# Patient Record
Sex: MALE | HISPANIC OR LATINO | URBAN - METROPOLITAN AREA
[De-identification: names, ages, dates, MRNs, and addresses within clinical notes are randomized per-mention and may not be internally consistent; named-entity substitution may affect disease eponyms.]

---

## 2021-05-05 ENCOUNTER — INPATIENT (INPATIENT)
Facility: HOSPITAL | Age: 71
LOS: 9 days | Discharge: HOME HEALTH | DRG: 246 | End: 2021-05-15
Attending: INTERNAL MEDICINE | Admitting: INTERNAL MEDICINE
Payer: MEDICARE

## 2021-05-05 VITALS
DIASTOLIC BLOOD PRESSURE: 131 MMHG | OXYGEN SATURATION: 97 % | RESPIRATION RATE: 24 BRPM | HEART RATE: 131 BPM | SYSTOLIC BLOOD PRESSURE: 235 MMHG

## 2021-05-05 DIAGNOSIS — I50.20 UNSPECIFIED SYSTOLIC (CONGESTIVE) HEART FAILURE: ICD-10-CM

## 2021-05-05 DIAGNOSIS — R77.8 OTHER SPECIFIED ABNORMALITIES OF PLASMA PROTEINS: ICD-10-CM

## 2021-05-05 DIAGNOSIS — D72.829 ELEVATED WHITE BLOOD CELL COUNT, UNSPECIFIED: ICD-10-CM

## 2021-05-05 DIAGNOSIS — N18.9 CHRONIC KIDNEY DISEASE, UNSPECIFIED: ICD-10-CM

## 2021-05-05 DIAGNOSIS — J96.02 ACUTE RESPIRATORY FAILURE WITH HYPERCAPNIA: ICD-10-CM

## 2021-05-05 DIAGNOSIS — E78.5 HYPERLIPIDEMIA, UNSPECIFIED: ICD-10-CM

## 2021-05-05 DIAGNOSIS — N40.0 BENIGN PROSTATIC HYPERPLASIA WITHOUT LOWER URINARY TRACT SYMPTOMS: ICD-10-CM

## 2021-05-05 DIAGNOSIS — I21.4 NON-ST ELEVATION (NSTEMI) MYOCARDIAL INFARCTION: ICD-10-CM

## 2021-05-05 DIAGNOSIS — I16.1 HYPERTENSIVE EMERGENCY: ICD-10-CM

## 2021-05-05 LAB
ALBUMIN SERPL ELPH-MCNC: 4 G/DL — SIGNIFICANT CHANGE UP (ref 3.3–5)
ALP SERPL-CCNC: 94 U/L — SIGNIFICANT CHANGE UP (ref 40–120)
ALT FLD-CCNC: 50 U/L — HIGH (ref 10–45)
ANION GAP SERPL CALC-SCNC: 10 MMOL/L — SIGNIFICANT CHANGE UP (ref 5–17)
APPEARANCE UR: CLEAR — SIGNIFICANT CHANGE UP
APTT BLD: 28.4 SEC — SIGNIFICANT CHANGE UP (ref 27.5–35.5)
AST SERPL-CCNC: 71 U/L — HIGH (ref 10–40)
BACTERIA # UR AUTO: PRESENT /HPF
BASE EXCESS BLDV CALC-SCNC: -2.5 MMOL/L — LOW (ref -2–3)
BASE EXCESS BLDV CALC-SCNC: -3.6 MMOL/L — LOW (ref -2–3)
BASOPHILS # BLD AUTO: 0.14 K/UL — SIGNIFICANT CHANGE UP (ref 0–0.2)
BASOPHILS NFR BLD AUTO: 0.8 % — SIGNIFICANT CHANGE UP (ref 0–2)
BILIRUB SERPL-MCNC: 0.2 MG/DL — SIGNIFICANT CHANGE UP (ref 0.2–1.2)
BILIRUB UR-MCNC: NEGATIVE — SIGNIFICANT CHANGE UP
BUN SERPL-MCNC: 41 MG/DL — HIGH (ref 7–23)
CA-I SERPL-SCNC: 1.09 MMOL/L — LOW (ref 1.15–1.33)
CALCIUM SERPL-MCNC: 8.7 MG/DL — SIGNIFICANT CHANGE UP (ref 8.4–10.5)
CHLORIDE SERPL-SCNC: 101 MMOL/L — SIGNIFICANT CHANGE UP (ref 96–108)
CK MB CFR SERPL CALC: 11.6 NG/ML — HIGH (ref 0–6.7)
CK MB CFR SERPL CALC: 12.2 NG/ML — HIGH (ref 0–6.7)
CK SERPL-CCNC: 326 U/L — HIGH (ref 30–200)
CK SERPL-CCNC: 371 U/L — HIGH (ref 30–200)
CK SERPL-CCNC: 417 U/L — HIGH (ref 30–200)
CO2 BLDV-SCNC: 28.8 MMOL/L — HIGH (ref 22–26)
CO2 BLDV-SCNC: 32.1 MMOL/L — HIGH (ref 22–26)
CO2 SERPL-SCNC: 28 MMOL/L — SIGNIFICANT CHANGE UP (ref 22–31)
COLOR SPEC: YELLOW — SIGNIFICANT CHANGE UP
COMMENT - URINE: SIGNIFICANT CHANGE UP
CREAT SERPL-MCNC: 3.08 MG/DL — HIGH (ref 0.5–1.3)
CRP SERPL-MCNC: 7.2 MG/L — HIGH (ref 0–4)
D DIMER BLD IA.RAPID-MCNC: 600 NG/ML DDU — HIGH
DIFF PNL FLD: ABNORMAL
EOSINOPHIL # BLD AUTO: 0.43 K/UL — SIGNIFICANT CHANGE UP (ref 0–0.5)
EOSINOPHIL NFR BLD AUTO: 2.4 % — SIGNIFICANT CHANGE UP (ref 0–6)
EPI CELLS # UR: SIGNIFICANT CHANGE UP /HPF (ref 0–5)
FERRITIN SERPL-MCNC: 129 NG/ML — SIGNIFICANT CHANGE UP (ref 30–400)
FIBRINOGEN PPP-MCNC: 479 MG/DL — HIGH (ref 258–438)
GAS PNL BLDA: SIGNIFICANT CHANGE UP
GAS PNL BLDV: 141 MMOL/L — SIGNIFICANT CHANGE UP (ref 136–145)
GAS PNL BLDV: SIGNIFICANT CHANGE UP
GAS PNL BLDV: SIGNIFICANT CHANGE UP
GLUCOSE SERPL-MCNC: 254 MG/DL — HIGH (ref 70–99)
GLUCOSE UR QL: NEGATIVE — SIGNIFICANT CHANGE UP
HCO3 BLDV-SCNC: 27 MMOL/L — SIGNIFICANT CHANGE UP (ref 22–29)
HCO3 BLDV-SCNC: 29 MMOL/L — SIGNIFICANT CHANGE UP (ref 22–29)
HCT VFR BLD CALC: 35 % — LOW (ref 39–50)
HGB BLD-MCNC: 10.1 G/DL — LOW (ref 13–17)
IMM GRANULOCYTES NFR BLD AUTO: 0.7 % — SIGNIFICANT CHANGE UP (ref 0–1.5)
INR BLD: 1.1 — SIGNIFICANT CHANGE UP (ref 0.88–1.16)
KETONES UR-MCNC: NEGATIVE — SIGNIFICANT CHANGE UP
LACTATE SERPL-SCNC: 0.7 MMOL/L — SIGNIFICANT CHANGE UP (ref 0.5–2)
LDH SERPL L TO P-CCNC: 303 U/L — HIGH (ref 50–242)
LEUKOCYTE ESTERASE UR-ACNC: NEGATIVE — SIGNIFICANT CHANGE UP
LYMPHOCYTES # BLD AUTO: 21.5 % — SIGNIFICANT CHANGE UP (ref 13–44)
LYMPHOCYTES # BLD AUTO: 3.85 K/UL — HIGH (ref 1–3.3)
MCHC RBC-ENTMCNC: 27.5 PG — SIGNIFICANT CHANGE UP (ref 27–34)
MCHC RBC-ENTMCNC: 28.9 GM/DL — LOW (ref 32–36)
MCV RBC AUTO: 95.4 FL — SIGNIFICANT CHANGE UP (ref 80–100)
MONOCYTES # BLD AUTO: 1.09 K/UL — HIGH (ref 0–0.9)
MONOCYTES NFR BLD AUTO: 6.1 % — SIGNIFICANT CHANGE UP (ref 2–14)
NEUTROPHILS # BLD AUTO: 12.31 K/UL — HIGH (ref 1.8–7.4)
NEUTROPHILS NFR BLD AUTO: 68.5 % — SIGNIFICANT CHANGE UP (ref 43–77)
NITRITE UR-MCNC: NEGATIVE — SIGNIFICANT CHANGE UP
NRBC # BLD: 0 /100 WBCS — SIGNIFICANT CHANGE UP (ref 0–0)
NT-PROBNP SERPL-SCNC: 3570 PG/ML — HIGH (ref 0–300)
PCO2 BLDV: 73 MMHG — HIGH (ref 42–55)
PCO2 BLDV: 90 MMHG — HIGH (ref 42–55)
PH BLDV: 7.12 — LOW (ref 7.32–7.43)
PH BLDV: 7.17 — LOW (ref 7.32–7.43)
PH UR: 6 — SIGNIFICANT CHANGE UP (ref 5–8)
PLATELET # BLD AUTO: 229 K/UL — SIGNIFICANT CHANGE UP (ref 150–400)
PO2 BLDV: 39 MMHG — SIGNIFICANT CHANGE UP
PO2 BLDV: 59 MMHG — SIGNIFICANT CHANGE UP
POTASSIUM BLDV-SCNC: 4 MMOL/L — SIGNIFICANT CHANGE UP (ref 3.5–5.1)
POTASSIUM SERPL-MCNC: 4.5 MMOL/L — SIGNIFICANT CHANGE UP (ref 3.5–5.3)
POTASSIUM SERPL-SCNC: 4.5 MMOL/L — SIGNIFICANT CHANGE UP (ref 3.5–5.3)
PROCALCITONIN SERPL-MCNC: 0.18 NG/ML — HIGH (ref 0.02–0.1)
PROT SERPL-MCNC: 7.8 G/DL — SIGNIFICANT CHANGE UP (ref 6–8.3)
PROT UR-MCNC: >=300 MG/DL
PROTHROM AB SERPL-ACNC: 13.1 SEC — SIGNIFICANT CHANGE UP (ref 10.6–13.6)
RBC # BLD: 3.67 M/UL — LOW (ref 4.2–5.8)
RBC # FLD: 13.6 % — SIGNIFICANT CHANGE UP (ref 10.3–14.5)
RBC CASTS # UR COMP ASSIST: < 5 /HPF — SIGNIFICANT CHANGE UP
SAO2 % BLDV: 56.7 % — SIGNIFICANT CHANGE UP
SAO2 % BLDV: 82.4 % — SIGNIFICANT CHANGE UP
SARS-COV-2 RNA SPEC QL NAA+PROBE: SIGNIFICANT CHANGE UP
SODIUM SERPL-SCNC: 139 MMOL/L — SIGNIFICANT CHANGE UP (ref 135–145)
SP GR SPEC: 1.02 — SIGNIFICANT CHANGE UP (ref 1–1.03)
TROPONIN T SERPL-MCNC: 0.09 NG/ML — CRITICAL HIGH (ref 0–0.01)
TROPONIN T SERPL-MCNC: 0.14 NG/ML — CRITICAL HIGH (ref 0–0.01)
TROPONIN T SERPL-MCNC: 0.16 NG/ML — CRITICAL HIGH (ref 0–0.01)
UROBILINOGEN FLD QL: 0.2 E.U./DL — SIGNIFICANT CHANGE UP
WBC # BLD: 17.94 K/UL — HIGH (ref 3.8–10.5)
WBC # FLD AUTO: 17.94 K/UL — HIGH (ref 3.8–10.5)
WBC UR QL: < 5 /HPF — SIGNIFICANT CHANGE UP

## 2021-05-05 PROCEDURE — 99222 1ST HOSP IP/OBS MODERATE 55: CPT

## 2021-05-05 PROCEDURE — 99291 CRITICAL CARE FIRST HOUR: CPT | Mod: CS

## 2021-05-05 PROCEDURE — 93308 TTE F-UP OR LMTD: CPT | Mod: 26

## 2021-05-05 PROCEDURE — 71045 X-RAY EXAM CHEST 1 VIEW: CPT | Mod: 26

## 2021-05-05 PROCEDURE — 99291 CRITICAL CARE FIRST HOUR: CPT

## 2021-05-05 RX ORDER — FUROSEMIDE 40 MG
40 TABLET ORAL ONCE
Refills: 0 | Status: COMPLETED | OUTPATIENT
Start: 2021-05-05 | End: 2021-05-05

## 2021-05-05 RX ORDER — FUROSEMIDE 40 MG
40 TABLET ORAL EVERY 12 HOURS
Refills: 0 | Status: DISCONTINUED | OUTPATIENT
Start: 2021-05-05 | End: 2021-05-07

## 2021-05-05 RX ORDER — HYDRALAZINE HCL 50 MG
1 TABLET ORAL
Qty: 0 | Refills: 0 | DISCHARGE

## 2021-05-05 RX ORDER — ASPIRIN/CALCIUM CARB/MAGNESIUM 324 MG
325 TABLET ORAL ONCE
Refills: 0 | Status: COMPLETED | OUTPATIENT
Start: 2021-05-05 | End: 2021-05-05

## 2021-05-05 RX ORDER — MOMETASONE FUROATE AND FORMOTEROL FUMARATE DIHYDRATE 200; 5 UG/1; UG/1
2 AEROSOL RESPIRATORY (INHALATION)
Qty: 0 | Refills: 0 | DISCHARGE

## 2021-05-05 RX ORDER — LEVALBUTEROL 1.25 MG/.5ML
2 SOLUTION, CONCENTRATE RESPIRATORY (INHALATION)
Qty: 0 | Refills: 0 | DISCHARGE

## 2021-05-05 RX ORDER — NITROGLYCERIN 6.5 MG
50 CAPSULE, EXTENDED RELEASE ORAL
Qty: 50 | Refills: 0 | Status: DISCONTINUED | OUTPATIENT
Start: 2021-05-05 | End: 2021-05-05

## 2021-05-05 RX ORDER — NITROGLYCERIN 6.5 MG
5 CAPSULE, EXTENDED RELEASE ORAL
Qty: 50 | Refills: 0 | Status: DISCONTINUED | OUTPATIENT
Start: 2021-05-05 | End: 2021-05-06

## 2021-05-05 RX ORDER — CLOPIDOGREL BISULFATE 75 MG/1
600 TABLET, FILM COATED ORAL ONCE
Refills: 0 | Status: COMPLETED | OUTPATIENT
Start: 2021-05-05 | End: 2021-05-05

## 2021-05-05 RX ORDER — TAMSULOSIN HYDROCHLORIDE 0.4 MG/1
1 CAPSULE ORAL
Qty: 0 | Refills: 0 | DISCHARGE

## 2021-05-05 RX ORDER — FOLIC ACID 0.8 MG
1 TABLET ORAL
Qty: 0 | Refills: 0 | DISCHARGE

## 2021-05-05 RX ORDER — HEPARIN SODIUM 5000 [USP'U]/ML
7000 INJECTION INTRAVENOUS; SUBCUTANEOUS ONCE
Refills: 0 | Status: COMPLETED | OUTPATIENT
Start: 2021-05-05 | End: 2021-05-05

## 2021-05-05 RX ORDER — FINASTERIDE 5 MG/1
1 TABLET, FILM COATED ORAL
Qty: 0 | Refills: 0 | DISCHARGE

## 2021-05-05 RX ORDER — ATORVASTATIN CALCIUM 80 MG/1
80 TABLET, FILM COATED ORAL AT BEDTIME
Refills: 0 | Status: DISCONTINUED | OUTPATIENT
Start: 2021-05-05 | End: 2021-05-15

## 2021-05-05 RX ORDER — AZITHROMYCIN 500 MG/1
500 TABLET, FILM COATED ORAL ONCE
Refills: 0 | Status: COMPLETED | OUTPATIENT
Start: 2021-05-05 | End: 2021-05-05

## 2021-05-05 RX ORDER — HEPARIN SODIUM 5000 [USP'U]/ML
7000 INJECTION INTRAVENOUS; SUBCUTANEOUS EVERY 6 HOURS
Refills: 0 | Status: DISCONTINUED | OUTPATIENT
Start: 2021-05-05 | End: 2021-05-09

## 2021-05-05 RX ORDER — NITROGLYCERIN 6.5 MG
45 CAPSULE, EXTENDED RELEASE ORAL
Qty: 50 | Refills: 0 | Status: DISCONTINUED | OUTPATIENT
Start: 2021-05-05 | End: 2021-05-05

## 2021-05-05 RX ORDER — SITAGLIPTIN 50 MG/1
1 TABLET, FILM COATED ORAL
Qty: 0 | Refills: 0 | DISCHARGE

## 2021-05-05 RX ORDER — ALLOPURINOL 300 MG
100 TABLET ORAL
Refills: 0 | Status: DISCONTINUED | OUTPATIENT
Start: 2021-05-05 | End: 2021-05-15

## 2021-05-05 RX ORDER — HEPARIN SODIUM 5000 [USP'U]/ML
3500 INJECTION INTRAVENOUS; SUBCUTANEOUS EVERY 6 HOURS
Refills: 0 | Status: DISCONTINUED | OUTPATIENT
Start: 2021-05-05 | End: 2021-05-09

## 2021-05-05 RX ORDER — BUDESONIDE AND FORMOTEROL FUMARATE DIHYDRATE 160; 4.5 UG/1; UG/1
2 AEROSOL RESPIRATORY (INHALATION)
Refills: 0 | Status: DISCONTINUED | OUTPATIENT
Start: 2021-05-05 | End: 2021-05-15

## 2021-05-05 RX ORDER — CALCITRIOL 0.5 UG/1
1 CAPSULE ORAL
Qty: 0 | Refills: 0 | DISCHARGE

## 2021-05-05 RX ORDER — HYDRALAZINE HCL 50 MG
100 TABLET ORAL EVERY 8 HOURS
Refills: 0 | Status: DISCONTINUED | OUTPATIENT
Start: 2021-05-05 | End: 2021-05-15

## 2021-05-05 RX ORDER — AZITHROMYCIN 500 MG/1
250 TABLET, FILM COATED ORAL DAILY
Refills: 0 | Status: COMPLETED | OUTPATIENT
Start: 2021-05-06 | End: 2021-05-09

## 2021-05-05 RX ORDER — CARVEDILOL PHOSPHATE 80 MG/1
25 CAPSULE, EXTENDED RELEASE ORAL EVERY 12 HOURS
Refills: 0 | Status: DISCONTINUED | OUTPATIENT
Start: 2021-05-05 | End: 2021-05-10

## 2021-05-05 RX ORDER — CLOPIDOGREL BISULFATE 75 MG/1
75 TABLET, FILM COATED ORAL DAILY
Refills: 0 | Status: DISCONTINUED | OUTPATIENT
Start: 2021-05-06 | End: 2021-05-10

## 2021-05-05 RX ORDER — ALLOPURINOL 300 MG
1 TABLET ORAL
Qty: 0 | Refills: 0 | DISCHARGE

## 2021-05-05 RX ORDER — TAMSULOSIN HYDROCHLORIDE 0.4 MG/1
0.4 CAPSULE ORAL AT BEDTIME
Refills: 0 | Status: DISCONTINUED | OUTPATIENT
Start: 2021-05-05 | End: 2021-05-15

## 2021-05-05 RX ORDER — CEFTRIAXONE 500 MG/1
1000 INJECTION, POWDER, FOR SOLUTION INTRAMUSCULAR; INTRAVENOUS EVERY 24 HOURS
Refills: 0 | Status: COMPLETED | OUTPATIENT
Start: 2021-05-05 | End: 2021-05-11

## 2021-05-05 RX ORDER — HEPARIN SODIUM 5000 [USP'U]/ML
INJECTION INTRAVENOUS; SUBCUTANEOUS
Qty: 25000 | Refills: 0 | Status: DISCONTINUED | OUTPATIENT
Start: 2021-05-05 | End: 2021-05-12

## 2021-05-05 RX ORDER — ASPIRIN/CALCIUM CARB/MAGNESIUM 324 MG
81 TABLET ORAL DAILY
Refills: 0 | Status: DISCONTINUED | OUTPATIENT
Start: 2021-05-06 | End: 2021-05-15

## 2021-05-05 RX ADMIN — Medication 1.5 MICROGRAM(S)/MIN: at 20:52

## 2021-05-05 RX ADMIN — CLOPIDOGREL BISULFATE 600 MILLIGRAM(S): 75 TABLET, FILM COATED ORAL at 18:36

## 2021-05-05 RX ADMIN — HEPARIN SODIUM 1600 UNIT(S)/HR: 5000 INJECTION INTRAVENOUS; SUBCUTANEOUS at 18:00

## 2021-05-05 RX ADMIN — HEPARIN SODIUM 5000 UNIT(S): 5000 INJECTION INTRAVENOUS; SUBCUTANEOUS at 17:56

## 2021-05-05 RX ADMIN — ATORVASTATIN CALCIUM 80 MILLIGRAM(S): 80 TABLET, FILM COATED ORAL at 23:19

## 2021-05-05 RX ADMIN — CARVEDILOL PHOSPHATE 25 MILLIGRAM(S): 80 CAPSULE, EXTENDED RELEASE ORAL at 19:55

## 2021-05-05 RX ADMIN — Medication 125 MILLIGRAM(S): at 13:04

## 2021-05-05 RX ADMIN — Medication 40 MILLIGRAM(S): at 11:27

## 2021-05-05 RX ADMIN — AZITHROMYCIN 500 MILLIGRAM(S): 500 TABLET, FILM COATED ORAL at 20:24

## 2021-05-05 RX ADMIN — Medication 15 MICROGRAM(S)/MIN: at 11:26

## 2021-05-05 RX ADMIN — CEFTRIAXONE 1000 MILLIGRAM(S): 500 INJECTION, POWDER, FOR SOLUTION INTRAMUSCULAR; INTRAVENOUS at 23:19

## 2021-05-05 RX ADMIN — Medication 325 MILLIGRAM(S): at 18:36

## 2021-05-05 RX ADMIN — TAMSULOSIN HYDROCHLORIDE 0.4 MILLIGRAM(S): 0.4 CAPSULE ORAL at 23:19

## 2021-05-05 RX ADMIN — Medication 40 MILLIGRAM(S): at 19:55

## 2021-05-05 RX ADMIN — Medication 100 MILLIGRAM(S): at 19:55

## 2021-05-05 NOTE — ED PROVIDER NOTE - PROGRESS NOTE DETAILS
[place]: Pt now more awake, HR improving. Does have mild CP that's improving, SOB improving. [place]: Vitals much improved, clinically pt is much improved. evaluated by ccu, recommending micu eval for hypercarbia. Pt likely has combo hypertensive emergnecy/APE/RAD.  Unknown baseline labs.   Will reassess. [place]: rediscussed w/ micu. awaiting dispo. pt vitals remain improved, pt much better appearing. [place]: rediscussed w/ MICU and cards fellow --> pt now off bipap, will start on HFNC --> cards fellow d/w dr. stephen. will admit tele for further care.

## 2021-05-05 NOTE — ED PROVIDER NOTE - CLINICAL SUMMARY MEDICAL DECISION MAKING FREE TEXT BOX
70M per EMS: [facility] called for SOB. Pt was in street. Hypoxic, started on cpap. Pt told them he has PMH COPD, DM, stents. Pt able to minimally answer questions - has hx of fluid in his lungs, is on diuretics. Denies CP. C/o SOB.  Hypertensive, tachypneic, tachy, satting well on BIPAP. Exam as above.  ddx: Likely hypertensive/emergency, APE  Started on BIPAP, nitro gtt, lasix.  PIV x2 obtained (1 w/ US guidance).   Labs.   Pt increasing somnolence since arrival. Will see how pt does on BIPAP/nitro.

## 2021-05-05 NOTE — H&P ADULT - PROBLEM SELECTOR PLAN 5
-- Pt w/ leukocytosis and possible opacities on CXR read.   -- WBC 17; Afebrile; denies URI symptoms and abdominal symptoms.   -- ABX: Ceftriaxone 1000mg IV q24hrs x7 days and Azithromycin 500mg PO QD x1 followed by Azithromycin 250mg PO QD x4 days.   -- Continue to monitor.

## 2021-05-05 NOTE — H&P ADULT - PROBLEM SELECTOR PLAN 3
-- Pt w/ Hx of systolic CHF.   -- TTE (5/5/21): limited study; LVEF 25-30%, mod-severe hypokinesis in all segments other than basal and mid anteroseptum segments.   -- HOME: Torsemide 20mg PO QD.   -- I/O: since admit and Lasix 40mg IV x1, pt net neg 850.   -- CONT: Lasix 40mg PO IV BID.   -- PLAN: monitor I/O on diuresis; once more euvolemic, consider proceeding with cath.

## 2021-05-05 NOTE — CONSULT NOTE ADULT - ASSESSMENT
70M PMH CHF CAD s/p multivessel stents with MIs (in 1990s, denies stents), PAD with ?BL LE stenting? in the 90s, history of water in the lungs, CKD, DM2,thyroid goiter, emphysema/asthma/ COPD/former smoker, on home O2 (COPD)  BIBEMS for SoB, per EMS SBP 240s at the scene. ICU consulted for resp failure     PULM:  #Hypoxic/hypercapnic resp failure  Pt presenting with  70M PMH CHF CAD s/p multivessel stents with MIs (in 1990s, denies stents), PAD with ?BL LE stenting? in the 90s, history of water in the lungs, CKD, DM2,thyroid goiter, emphysema/asthma/ COPD/former smoker, on home O2 (COPD)  BIBEMS for SoB, per EMS SBP 240s at the scene. ICU consulted for resp failure     PULM:  #Hypoxic/hypercapnic resp failure  Pt presenting with hypoxia and Co2 of 90 on vBG. Pt was found to be HTNsive to 230s with significant B-lines on bedside echo now s/p 40mg lasix IV. Pt initially placed on BiPAP and weened to HFNC s/p 1L UOP after the lasix. Likely in the setting HTNsive emergency causing pulm edema  - Cardiology is accepting to their service   - Currently on nitroglycerin drip   - Currently on HFNC     #COPD/Asthma  Only on albuterol inhalers at home. On 3L NC at home continuously. Wheezing in setting of pulm edema now resolved, no sputum production, low s/f COPD exacerbation no role for abx at this time. Leukocytosis present suspect reactive. SPO2 goal 88-92.   - C/w HFNC   - Duonebs prn       CARDIO  #HTNsive Emergency   Pt with sBPs in the 230s upon admission resulting in flash pulm edema. Pt was started on a nitro gtt. Pt has history of poorly controlled HTN per his outpt cardiologist (Dr. Simental). Isosorbide 60mg QD, norvasc 5mg QD, clonidine .1mg QD, losartan 100mg QD, Hydralazine 100mg TID are his home meds   - C/w nitro gtt, try to ween off and restart home meds as tolerated.     DISPO: Per ED provider 70M PMH CHF CAD s/p multivessel stents with MIs (in 1990s, denies stents), PAD with ?BL LE stenting? in the 90s, history of water in the lungs, CKD, DM2,thyroid goiter, emphysema/asthma/ COPD/former smoker, on home O2 (COPD)  BIBEMS for SoB, per EMS SBP 240s at the scene. ICU consulted for resp failure     PULM:  #Hypoxic/hypercapnic resp failure  Pt presenting with hypoxia and Co2 of 90 on vBG. Pt was found to be HTNsive to 230s with significant B-lines on bedside echo now s/p 40mg lasix IV. Pt initially placed on BiPAP and weened to HFNC s/p 1L UOP after the lasix. Likely in the setting HTNsive emergency causing pulm edema  - Cardiology is accepting to their service   - Currently on nitroglycerin drip   - Currently on HFNC     #COPD/Asthma  Only on albuterol inhalers at home. On 3L NC at home continuously. Wheezing in setting of pulm edema now resolved, no sputum production, low s/f COPD exacerbation no role for abx at this time. Leukocytosis present suspect reactive. SPO2 goal 88-92.   - C/w HFNC   - Duonebs prn       CARDIO  #HTNsive Emergency   Pt with sBPs in the 230s upon admission resulting in flash pulm edema. Pt was started on a nitro gtt. Pt has history of poorly controlled HTN per his outpt cardiologist (Dr. Simental). Isosorbide 60mg QD, norvasc 5mg QD, clonidine .1mg BID, losartan 100mg QD, Hydralazine 100mg BID, coreg 25mg BID are his home meds   - C/w nitro gtt, try to ween off and restart home meds as tolerated.     DISPO: Per ED provider 70M PMH CHF CAD s/p multivessel stents with MIs (in 1990s, denies stents), PAD with ?BL LE stenting? in the 90s, history of water in the lungs, CKD, DM2,thyroid goiter, emphysema/asthma/ COPD/former smoker, on home O2 (COPD)  BIBEMS for SoB, per EMS SBP 240s at the scene. ICU consulted for resp failure     PULM:  #Hypoxic/hypercapnic resp failure  Pt presenting with hypoxia and Co2 of 90 on vBG. Pt was found to be HTNsive to 230s with significant B-lines on bedside echo now s/p 40mg lasix IV. Pt initially placed on BiPAP and weened to HFNC s/p 1L UOP after the lasix. Likely in the setting HTNsive emergency causing pulm edema  - Cardiology is accepting to their service   - Currently on nitroglycerin drip   - Currently on HFNC   - Obtain LE dopplers    #COPD/Asthma  Only on albuterol inhalers at home. On 3L NC at home continuously. Wheezing in setting of pulm edema now resolved, no sputum production, low s/f COPD exacerbation no role for abx at this time. Leukocytosis present suspect reactive. SPO2 goal 88-92.   - C/w HFNC   - Duonebs prn       CARDIO  #HTNsive Emergency   Pt with sBPs in the 230s upon admission resulting in flash pulm edema. Pt was started on a nitro gtt. Pt has history of poorly controlled HTN per his outpt cardiologist (Dr. Simental). Isosorbide 60mg QD, norvasc 5mg QD, clonidine .1mg BID, losartan 100mg QD, Hydralazine 100mg BID, coreg 25mg BID are his home meds   - C/w nitro gtt, try to ween off and restart home meds as tolerated.     DISPO: Per ED provider

## 2021-05-05 NOTE — H&P ADULT - PROBLEM SELECTOR PLAN 4
-- Hx of CKD; unknown Cr.   -- Cr 3.09 on admission.   -- Renal US ordered.   -- Urine lytes ordered.   -- CONSULT RENAL IN THE MORNING.

## 2021-05-05 NOTE — ED PROVIDER NOTE - PHYSICAL EXAMINATION
increasingly tired appearing, opens eyes and answers simple questions to verbal stimuli  unofficial bedside sono: hypocontractile, diffue b-lines, no pericardial effusion  resp: accessory muscle use/abdominal breathing, decreased breath sounds b/l  very diaphoretic

## 2021-05-05 NOTE — H&P ADULT - PROBLEM SELECTOR PLAN 2
-- Pt reports prior to presentation he had substernal chest pain.   -- Denying Hx of cardiac stents, but outpatient provider mentioned he may have had stents/MI in past.   -- EKG developed TWI in inferolateral leads.   -- TROP T 0.09 --> 0.14.   -- s/p ASA 325mg PO x1 and Plavix 600mg PO x1 load.   -- CONT: Heparin gtt, ASA 81mg PO QD, Plavix 75mg PO QD, Coreg 25mg PO BID, Atorvastatin 80mg PO QHS.   -- PLAN: medical management of NSTEMI at this time; patient remains ASYMPTOMATIC; monitor for symptoms and trend Troponin T to peak w/ serial EKGs to monitor for dynamic EKG developments; once pt euvolemic, consider taking for cath.

## 2021-05-05 NOTE — H&P ADULT - PROBLEM SELECTOR PLAN 1
-- Presented w/ HTN emergency /131 w/ acute respiratory failure -- Presented w/ HTN emergency /131 w/ acute respiratory failure 2/2 flash pulmonary edema likely due to HTN.   -- Pulmonary edema - pt received Lasix 40 IV x1 and placed on BiPAP in ER; Now on high flow nasal cannula.   -- Started on Nitro gtt in the ER.   -- SBP goal = 160.   -- CONT: home Coreg 25mg PO BID, Hydralazine 100mg PO TID, and Nitro gtt at 45mcg/min.   -- PLAN: continue to monitor BP, and reintroduce home regimen as appropriate.

## 2021-05-05 NOTE — ED PEDIATRIC NURSE REASSESSMENT NOTE - NS ED NURSE REASSESS COMMENT FT2
1100 Pt arrived to ED with EMS on CPAP. Pt SOB, lethargic, and diaphoretic. Pt placed on monitor.  1105 IV access obtained by RN and Dr Washington. Pt medicated as ordered and documented in MAR. Pt placed on bipap by ED resp. EKG completed. MD performing bedside US.  1145 Pt awake, alert oriented x3. Skin dry and warm. Pt reports feeling more comfortable. Tolerating bipap well. Ross placed by RN using sterile technique as documented. Will continue to monitor.

## 2021-05-05 NOTE — H&P ADULT - ASSESSMENT
Pt is 71yo M w/ PMHx of HLD, DM T2, uncontrolled HTN, CAD (questionable Hx of stents? pt denies but outpatient may have more info??), PAD (pt reporting stents in L lower extremity), HFrEF (EF 23% in 2017), COPD (pt w/ home O2 via NC PRN), CKD (unknown Cr baseline) who presented to [facility] ED on 5/5/21 Pt is 69yo M w/ PMHx of HLD, DM T2, uncontrolled HTN, CAD (questionable Hx of stents? pt denies but outpatient may have more info??), PAD (pt reporting stents in L lower extremity), HFrEF (EF 23% in 2017), COPD (pt w/ home O2 via NC PRN), CKD (unknown Cr baseline) who presented to [facility] ED on 5/5/21 in acute respiratory distress suspected in the setting of HTN emergency. Respiratory distress improved w/ Lasix 40 IV x1 and BiPAP, now on High Flow nasal cannula and diuresing w/ Lasix 40 IV BID. TTE showing LVEF 25-30%. HTN improved w/ Nitro gtt and Coreg/Hydralazine (SBP goal 160 at this time). Pt ruled in NSTEMI w/ Trop 0.09 --> 0.14 w/ development of TWI in inferolateral leads. Loaded w/ ASA/Plavix full dose and started Heparin gtt. Trending trops to peak. Once patient euvolemic will consider taking for cardiac cath.

## 2021-05-05 NOTE — ED PROVIDER NOTE - OBJECTIVE STATEMENT
70M per EMS: [facility] called for SOB. Pt was in street. Hypoxic, started on cpap. Pt told them he has PMH COPD, DM, stents. Pt able to minimally answer questions - has hx of fluid in his lungs, is on diuretics. Denies CP. C/o SOB.

## 2021-05-05 NOTE — H&P ADULT - NEUROLOGICAL DETAILS
alert and oriented x 3/responds to pain/sensation intact/cranial nerves intact/no spontaneous movement/normal strength

## 2021-05-05 NOTE — CONSULT NOTE ADULT - SUBJECTIVE AND OBJECTIVE BOX
HPI:  70M PMH CHF CAD with MIs (in 1990s, denies stents), PAD with ?bilateral LE stenting? in the 90s, CKD, DM2,thyroid goiter, emphysema/asthma/ COPD/former smoker, on home O2 who presents with acute shortness of breath. Patient found to be hypertensive initially. No chest pain or lower extremity swelling.     PAST MEDICAL & SURGICAL HISTORY:    PREVIOUS DIAGNOSTIC TESTING:      FAMILY HISTORY:    ALLERGIES/INTOLERANCES:  No Known Allergies    HOME MEDICATIONS:    INPATIENT MEDICATIONS:  nitroglycerin  Infusion 50 MICROgram(s)/Min (15 mL/Hr) IV Continuous <Continuous>      REVIEW OF SYSTEMS: See HPI     PHYSICAL EXAM:  Gen: in mild distress   HEENT: EOMI   Cor: Normal s1, s2. RRR.  Lungs: b/l expiratory wheezing    Abd: soft, non-tender, non-distended  Ext: no edema  Neuro: alert and attentive    T(C): 36 (05-05-21 @ 11:15), Max: 36 (05-05-21 @ 11:15)  HR: 72 (05-05-21 @ 14:15) (72 - 131)  BP: 163/86 (05-05-21 @ 14:15) (137/83 - 235/131)  RR: 20 (05-05-21 @ 14:15) (20 - 26)  SpO2: 95% (05-05-21 @ 14:15) (93% - 100%)  Wt(kg): --    I&O's Summary    TELEMETRY: 	      ECG:  	  	  LABS:                        10.1   17.94 )-----------( 229      ( 05 May 2021 11:32 )             35.0     05-05    139  |  101  |  41<H>  ----------------------------<  254<H>  4.5   |  28  |  3.08<H>    Ca    8.7      05 May 2021 11:31    TPro  7.8  /  Alb  4.0  /  TBili  0.2  /  DBili  x   /  AST  71<H>  /  ALT  50<H>  /  AlkPhos  94  05-05      Lipid Profile:   HgA1c:   TSH:     CARDIAC MARKERS:          proBNP: Serum Pro-Brain Natriuretic Peptide: 3570 pg/mL (05-05 @ 11:31)      RADIOLOGY:         HPI:  70M PMH CHF CAD with MIs (in 1990s, denies stents), PAD with ?bilateral LE stenting? in the 90s, CKD, DM2,thyroid goiter, emphysema/asthma/ COPD/former smoker, on home O2 who presents with acute shortness of breath. Patient found to be hypertensive initially. No chest pain or lower extremity swelling.     PAST MEDICAL & SURGICAL HISTORY:    PREVIOUS DIAGNOSTIC TESTING:      FAMILY HISTORY:    ALLERGIES/INTOLERANCES:  No Known Allergies    HOME MEDICATIONS:    INPATIENT MEDICATIONS:  nitroglycerin  Infusion 50 MICROgram(s)/Min (15 mL/Hr) IV Continuous <Continuous>    REVIEW OF SYSTEMS: See HPI     PHYSICAL EXAM:  Gen: in mild distress   HEENT: EOMI   Cor: Normal s1, s2. RRR.  Lungs: b/l expiratory wheezing    Abd: soft, non-tender, non-distended  Ext: no edema  Neuro: alert and attentive    T(C): 36 (05-05-21 @ 11:15), Max: 36 (05-05-21 @ 11:15)  HR: 72 (05-05-21 @ 14:15) (72 - 131)  BP: 163/86 (05-05-21 @ 14:15) (137/83 - 235/131)  RR: 20 (05-05-21 @ 14:15) (20 - 26)  SpO2: 95% (05-05-21 @ 14:15) (93% - 100%)  Wt(kg): --    I&O's Summary    ECG:  sinus tach, RBBB 	  	  LABS:                        10.1   17.94 )-----------( 229      ( 05 May 2021 11:32 )             35.0     05-05    139  |  101  |  41<H>  ----------------------------<  254<H>  4.5   |  28  |  3.08<H>    Ca    8.7      05 May 2021 11:31    TPro  7.8  /  Alb  4.0  /  TBili  0.2  /  DBili  x   /  AST  71<H>  /  ALT  50<H>  /  AlkPhos  94  05-05      proBNP: Serum Pro-Brain Natriuretic Peptide: 3570 pg/mL (05-05 @ 11:31)      RADIOLOGY:         HPI:  70M PMH CHF CAD with MIs (in 1990s, denies stents), PAD with ?bilateral LE stenting? in the 90s, CKD, DM2,thyroid goiter, emphysema/asthma/ COPD/former smoker, on home O2 who presents with acute shortness of breath. Patient found to be hypertensive initially. No chest pain or lower extremity swelling.     PAST MEDICAL & SURGICAL HISTORY:    PREVIOUS DIAGNOSTIC TESTING:      FAMILY HISTORY:    ALLERGIES/INTOLERANCES:  No Known Allergies    HOME MEDICATIONS:    INPATIENT MEDICATIONS:  nitroglycerin  Infusion 50 MICROgram(s)/Min (15 mL/Hr) IV Continuous <Continuous>    REVIEW OF SYSTEMS: See HPI     PHYSICAL EXAM:  Gen: in mild distress   HEENT: EOMI   Cor: Normal s1, s2. RRR.  Lungs: b/l expiratory wheezing    Abd: soft, non-tender, non-distended  Ext: no edema  Neuro: alert and attentive    T(C): 36 (05-05-21 @ 11:15), Max: 36 (05-05-21 @ 11:15)  HR: 72 (05-05-21 @ 14:15) (72 - 131)  BP: 163/86 (05-05-21 @ 14:15) (137/83 - 235/131)  RR: 20 (05-05-21 @ 14:15) (20 - 26)  SpO2: 95% (05-05-21 @ 14:15) (93% - 100%)  Wt(kg): --    I&O's Summary    	  LABS:                        10.1   17.94 )-----------( 229      ( 05 May 2021 11:32 )             35.0     05-05    139  |  101  |  41<H>  ----------------------------<  254<H>  4.5   |  28  |  3.08<H>    Ca    8.7      05 May 2021 11:31    TPro  7.8  /  Alb  4.0  /  TBili  0.2  /  DBili  x   /  AST  71<H>  /  ALT  50<H>  /  AlkPhos  94  05-05      proBNP: Serum Pro-Brain Natriuretic Peptide: 3570 pg/mL (05-05 @ 11:31)      RADIOLOGY:

## 2021-05-05 NOTE — H&P ADULT - PROBLEM SELECTOR PLAN 7
-- CONT: Tamsulosin 0.4mg PO QHS.         DVT ppx: heparin gtt  Dispo: pending clinical improvement.

## 2021-05-05 NOTE — ED PROVIDER NOTE - CARE PLAN
Principal Discharge DX:	Respiratory failure  Secondary Diagnosis:	Hypertensive emergency   Principal Discharge DX:	Respiratory failure  Secondary Diagnosis:	Hypertensive emergency  Secondary Diagnosis:	Hypercarbia

## 2021-05-05 NOTE — ED ADULT NURSE NOTE - NS TRANSFER PATIENT BELONGINGS
O2 tank and cart, clothing, shoes, wallet, iphone/Cell Phone/PDA (specify)/Other belongings/Clothing

## 2021-05-05 NOTE — H&P ADULT - HISTORY OF PRESENT ILLNESS
Pt is 69yo M w/ PMHx of HLD, DM T2, uncontrolled HTN, CAD (questionable Hx of stents? awaiting outpatient records), PAD (pt reporting stents in L HFrEF (EF 23% in 2017), COPD (pt w/ home O2 via NC PRN), CKD (unknown Cr baseline) who presented to [facility] ED on 5/5/21 in acute respiratory distress, HTN emergency. Pt reporting some non-compliance w/ medications recently. States he has felt "off" over the past week and today he experienced chest pain, dizziness, and acute onset of shortness of breath. Pt denies palpitations, syncope, abdominal pain/discomfort, PND, LE edema, fever/chills, URI symptoms.     In the ED, vitals: /131 (now 170-80s),  (now 72), O2 97% on BiPAP (now 95% on high flow), RR 24 (now 18), T 96.8 F. In the ED, labs: WBC 17.94, Hgb/Hct 10.1/35.0, Plt Cnt 229; Na 139, K 4.5; BUN/Cr 41/3.08; Trop T 0.09; BNP 3570; Lactate 0.7; D-Dimer 600; CRP 7.2; Procalcitonin 0.18; . EKG sinus rhythm w/ TWI in inferolateral leads. CXR w/ B/l opacities/R pleural effusion. Bedside US showing B-lines and severe global wall motion abnormalities, but not pericardia effusion. COVID-19 negative.     INTERVENTIONS: pt given Lasix 40mg IV x1, SoluMedrol 125mg IV x1, and initiated on Nitro gtt @ 50mcg/min. Pt also placed on BiPAP, and then weaned down to high flow NC. Pt clinical status much improved at this time. Not in respiratory distress at this time.     Pt admitted to cardiology for further management.  Pt is 69yo M w/ PMHx of HLD, DM T2, uncontrolled HTN, CAD (questionable Hx of stents? pt denies but outpatient may have more info??), PAD (pt reporting stents in L lower extremity), HFrEF (EF 23% in 2017), COPD (pt w/ home O2 via NC PRN), CKD (unknown Cr baseline) who presented to [facility] ED on 5/5/21 in acute respiratory distress, HTN emergency. Pt reporting some non-compliance w/ medications recently. States he has felt "off" over the past week and today he experienced chest pain, dizziness, and acute onset of shortness of breath. Pt denies palpitations, syncope, abdominal pain/discomfort, PND, LE edema, fever/chills, URI symptoms.     In the ED, vitals: /131 (now 170-80s),  (now 72), O2 97% on BiPAP (now 95% on high flow), RR 24 (now 18), T 96.8 F. In the ED, labs: WBC 17.94, Hgb/Hct 10.1/35.0, Plt Cnt 229; Na 139, K 4.5; BUN/Cr 41/3.08; Trop T 0.09; BNP 3570; Lactate 0.7; D-Dimer 600; CRP 7.2; Procalcitonin 0.18; . EKG sinus rhythm w/ TWI in inferolateral leads. CXR w/ B/l opacities/R pleural effusion. Bedside US showing B-lines and severe global wall motion abnormalities, but not pericardia effusion. COVID-19 negative.     INTERVENTIONS: pt given Lasix 40mg IV x1, SoluMedrol 125mg IV x1, and initiated on Nitro gtt @ 50mcg/min. Pt also placed on BiPAP, and then weaned down to high flow NC. Pt clinical status much improved at this time. Not in respiratory distress at this time.     Pt admitted to cardiology for further management.

## 2021-05-05 NOTE — CONSULT NOTE ADULT - SUBJECTIVE AND OBJECTIVE BOX
70M PMH CHF CAD with MIs (in 1990s, denies stents), PAD with ?bilateral LE stenting? in the 90s, CKD, DM2,thyroid goiter, emphysema/asthma/ COPD/former smoker, on home O2 (unable due to CHF or COPD)  BIBEMS for SoB, per EMS SBP 240s at the scene.  Pt reports at baseline does not walk much (<2blocks) due to Glass. No HA, CP/pressure, URI, cough, n/v/d/c, diarrhea or constipation, polyuric which he attributes to water pill, no dysuria, no LE swelling. Never worn BiPAP before. No recent hospitalizations, no recent illnesses.     T96.8, -96. -163/131-83, RR 24-26. 100% on BIPAP   EKG: AF-, QTc 583   WBC 17.9, Hb 10, Plt 229  ddimer 600, fibrinogen 479, BUN 41, Cre 3, AST 50, ALT 71, ALT 50, Alk phos 94, CRP 7, pro-stacy 0.18, trop 0.09, pro-bnp 3570   pH 7.12, pCO2 90, UA: protein >300,   ED interventions: lasix 40, solu-medrol 125, nitroglycerin gtt     Per PCP Feb 2021: Cre 3.26, BUN 35,  70M PMH CHF CAD with MIs (in 1990s, denies stents), PAD with ?bilateral LE stenting? in the , history of water in the lungs, CKD, DM2,thyroid goiter, emphysema/asthma/ COPD/former smoker, on home O2 (unable due to CHF or COPD)  BIBEMS for SoB, per EMS SBP 240s at the scene.  Pt reports at baseline does not walk much (<2blocks) due to Glass. No HA, CP/pressure, URI, cough, n/v/d/c, diarrhea or constipation, polyuric which he attributes to water pill, no dysuria, no LE swelling. Never worn BiPAP before. No recent hospitalizations, no recent illnesses. Forgot to take his AM meds.   Meds: folic acid 1mg, isosorbide mononitrate 60mg, toresemide 20, amlodipine 5mg, Clonidine 0.1mg, losartan 100mg, allopurinol 100, coreg 25hydralazine 100, [id number], cilastazol 50, ASA 81, lipitor 80, tamsulosin 0.4, finasteride 5mg     T96.8, -96. -163/131-83, RR 24-26. 100% on BIPAP   EKG: AF-, QTc 583   WBC 17.9, Hb 10, Plt 229  ddimer 600, fibrinogen 479, BUN 41, Cre 3, AST 50, ALT 71, ALT 50, Alk phos 94, CRP 7, pro-stacy 0.18, trop 0.09, pro-bnp 3570   pH 7.12, pCO2 90, UA: protein >300,   ED echo: Multiple B-lines visualized with severe global wall motion abnormality.  ED interventions: lasix 40, solu-medrol 125, nitroglycerin gtt       Per PCP 2021: Cre 3.26, BUN 35  Patient is a 70y old  Male who presents with a chief complaint of     HPI:      Allergies    No Known Allergies    Intolerances      Home Medications:      SOCIAL HX:     Smoking          ETOH/Illicit drugs          Occupation    PAST MEDICAL & SURGICAL HISTORY:      FAMILY HISTORY:  :    No known cardiovascular or pulmonary family history     ROS:  See HPI     PHYSICAL EXAM    ICU Vital Signs Last 24 Hrs  T(C): 36 (05 May 2021 11:15), Max: 36 (05 May 2021 11:15)  T(F): 96.8 (05 May 2021 11:15), Max: 96.8 (05 May 2021 11:15)  HR: 75 (05 May 2021 13:33) (75 - 131)  BP: 137/83 (05 May 2021 13:33) (137/83 - 235/131)  BP(mean): --  ABP: --  ABP(mean): --  RR: 20 (05 May 2021 13:33) (20 - 26)  SpO2: 96% (05 May 2021 13:33) (93% - 100%)      General: Not in distress  HEENT:  FERMÍN              Lymphatic system: No LN  Lungs: Bilateral BS  Cardiovascular: Regular  Gastrointestinal: Soft, Positive BS  Musculoskeletal: No clubbing.  Moves all extremities.    Skin: Warm.  Intact  Neurological: No motor or sensory deficit         LABS:                          10.1   17.94 )-----------( 229      ( 05 May 2021 11:32 )             35.0                                               05    139  |  101  |  41<H>  ----------------------------<  254<H>  4.5   |  28  |  3.08<H>    Ca    8.7      05 May 2021 11:31    TPro  7.8  /  Alb  4.0  /  TBili  0.2  /  DBili  x   /  AST  71<H>  /  ALT  50<H>  /  AlkPhos  94  05-05      PT/INR - ( 05 May 2021 11:32 )   PT: 13.1 sec;   INR: 1.10          PTT - ( 05 May 2021 11:32 )  PTT:28.4 sec                                       Urinalysis Basic - ( 05 May 2021 12:03 )    Color: Yellow / Appearance: Clear / S.025 / pH: x  Gluc: x / Ketone: NEGATIVE  / Bili: Negative / Urobili: 0.2 E.U./dL   Blood: x / Protein: >=300 mg/dL / Nitrite: NEGATIVE   Leuk Esterase: NEGATIVE / RBC: < 5 /HPF / WBC < 5 /HPF   Sq Epi: x / Non Sq Epi: 0-5 /HPF / Bacteria: Present /HPF        CARDIAC MARKERS ( 05 May 2021 11:31 )  x     / 0.09 ng/mL / 417 U/L / x     / x                                                LIVER FUNCTIONS - ( 05 May 2021 11:31 )  Alb: 4.0 g/dL / Pro: 7.8 g/dL / ALK PHOS: 94 U/L / ALT: 50 U/L / AST: 71 U/L / GGT: x                                                                                                                                           CXR:    ECHO:    MEDICATIONS  (STANDING):  nitroglycerin  Infusion 50 MICROgram(s)/Min (15 mL/Hr) IV Continuous <Continuous>    MEDICATIONS  (PRN):         70M PMH CHF CAD with MIs (in , denies stents), PAD with ?bilateral LE stenting? in the , history of water in the lungs, CKD, DM2,thyroid goiter, emphysema/asthma/ COPD/former smoker, on home O2 (unable due to CHF or COPD)  BIBEMS for SoB, per EMS SBP 240s at the scene.  Pt reports at baseline does not walk much (<2blocks) due to Glass. No HA, CP/pressure, URI, cough, n/v/d/c, diarrhea or constipation, polyuric which he attributes to water pill, no dysuria, no LE swelling. Never worn BiPAP before. No recent hospitalizations, no recent illnesses. Forgot to take his AM meds. Per PCP in 2021: Cre 3.26, BUN 35  Meds: folic acid 1mg, isosorbide mononitrate 60mg, toresemide 20, amlodipine 5mg, Clonidine 0.1mg, losartan 100mg, allopurinol 100, coreg 25hydralazine 100, [id number], cilastazol 50, ASA 81, lipitor 80, tamsulosin 0.4, finasteride 5mg     T96.8, -96. -163/131-83, RR 24-26. 100% on BIPAP   EKG: AF-, QTc 583   WBC 17.9, Hb 10, Plt 229  ddimer 600, fibrinogen 479, BUN 41, Cre 3, AST 50, ALT 71, ALT 50, Alk phos 94, CRP 7, pro-stacy 0.18, trop 0.09, pro-bnp 3570   pH 7.12, pCO2 90, UA: protein >300,   ED echo: Multiple B-lines visualized with severe global wall motion abnormality.  ED interventions: lasix 40, solu-medrol 125, nitroglycerin gtt. Pt initially somnolent now feeling "much better".         Patient is a 70y old  Male who presents with a chief complaint of     HPI:      Allergies    No Known Allergies    Intolerances      Home Medications:      SOCIAL HX:     Smoking          ETOH/Illicit drugs          Occupation    PAST MEDICAL & SURGICAL HISTORY:      FAMILY HISTORY:  :    No known cardiovascular or pulmonary family history     ROS:  See HPI     PHYSICAL EXAM    ICU Vital Signs Last 24 Hrs  T(C): 36 (05 May 2021 11:15), Max: 36 (05 May 2021 11:15)  T(F): 96.8 (05 May 2021 11:15), Max: 96.8 (05 May 2021 11:15)  HR: 75 (05 May 2021 13:33) (75 - 131)  BP: 137/83 (05 May 2021 13:33) (137/83 - 235/131)  BP(mean): --  ABP: --  ABP(mean): --  RR: 20 (05 May 2021 13:33) (20 - 26)  SpO2: 96% (05 May 2021 13:33) (93% - 100%)      General: Not in distress  HEENT:  FERMÍN              Lymphatic system: No LN  Lungs: Bilateral BS  Cardiovascular: Regular  Gastrointestinal: Soft, Positive BS  Musculoskeletal: No clubbing.  Moves all extremities.    Skin: Warm.  Intact  Neurological: No motor or sensory deficit         LABS:                          10.1   17.94 )-----------( 229      ( 05 May 2021 11:32 )             35.0                                               05-    139  |  101  |  41<H>  ----------------------------<  254<H>  4.5   |  28  |  3.08<H>    Ca    8.7      05 May 2021 11:31    TPro  7.8  /  Alb  4.0  /  TBili  0.2  /  DBili  x   /  AST  71<H>  /  ALT  50<H>  /  AlkPhos  94  05-05      PT/INR - ( 05 May 2021 11:32 )   PT: 13.1 sec;   INR: 1.10          PTT - ( 05 May 2021 11:32 )  PTT:28.4 sec                                       Urinalysis Basic - ( 05 May 2021 12:03 )    Color: Yellow / Appearance: Clear / S.025 / pH: x  Gluc: x / Ketone: NEGATIVE  / Bili: Negative / Urobili: 0.2 E.U./dL   Blood: x / Protein: >=300 mg/dL / Nitrite: NEGATIVE   Leuk Esterase: NEGATIVE / RBC: < 5 /HPF / WBC < 5 /HPF   Sq Epi: x / Non Sq Epi: 0-5 /HPF / Bacteria: Present /HPF        CARDIAC MARKERS ( 05 May 2021 11:31 )  x     / 0.09 ng/mL / 417 U/L / x     / x                                                LIVER FUNCTIONS - ( 05 May 2021 11:31 )  Alb: 4.0 g/dL / Pro: 7.8 g/dL / ALK PHOS: 94 U/L / ALT: 50 U/L / AST: 71 U/L / GGT: x                                                                                                                                           CXR:    ECHO:    MEDICATIONS  (STANDING):  nitroglycerin  Infusion 50 MICROgram(s)/Min (15 mL/Hr) IV Continuous <Continuous>    MEDICATIONS  (PRN):         70M PMH CHF CAD s/p multivessel stents with MIs (in , denies stents), PAD with ?BL LE stenting? in the , history of water in the lungs, CKD, DM2,thyroid goiter, emphysema/asthma/ COPD/former smoker, on home O2 (COPD)  BIBEMS for SoB, per EMS SBP 240s at the scene.  Pt reports at baseline does not walk much (<2blocks) due to Glass. No HA, CP/pressure, URI, cough, n/v/d/c, diarrhea or constipation, polyuric which he attributes to water pill, no dysuria, no LE swelling. Never worn BiPAP before. No recent hospitalizations, no recent illnesses. Forgot to take his AM meds. Per PCP in 2021: Cre 3.26, BUN 35  Meds: folic acid 1mg, isosorbide mononitrate 60mg, toresemide 20, amlodipine 5mg, Clonidine 0.1mg, losartan 100mg, allopurinol 100, coreg 25hydralazine 100, [id number], cilastazol 50, ASA 81, lipitor 80, tamsulosin 0.4, finasteride 5mg     T96.8, -96. -163/131-83, RR 24-26. 100% on BIPAP   EKG: AF-, QTc 583   WBC 17.9, Hb 10, Plt 229  ddimer 600, fibrinogen 479, BUN 41, Cre 3, AST 50, ALT 71, ALT 50, Alk phos 94, CRP 7, pro-stacy 0.18, trop 0.09, pro-bnp 3570   pH 7.12, pCO2 90, UA: protein >300,   ED echo: Multiple B-lines visualized with severe global wall motion abnormality.  ED interventions: lasix 40, solu-medrol 125, nitroglycerin gtt. Pt initially somnolent now feeling "much better".         Patient is a 70y old  Male who presents with a chief complaint of     HPI:      Allergies    No Known Allergies    Intolerances      Home Medications:      SOCIAL HX:     Smoking          ETOH/Illicit drugs          Occupation    PAST MEDICAL & SURGICAL HISTORY:      FAMILY HISTORY:  :    No known cardiovascular or pulmonary family history     ROS:  See HPI     PHYSICAL EXAM    ICU Vital Signs Last 24 Hrs  T(C): 36 (05 May 2021 11:15), Max: 36 (05 May 2021 11:15)  T(F): 96.8 (05 May 2021 11:15), Max: 96.8 (05 May 2021 11:15)  HR: 75 (05 May 2021 13:33) (75 - 131)  BP: 137/83 (05 May 2021 13:33) (137/83 - 235/131)  BP(mean): --  ABP: --  ABP(mean): --  RR: 20 (05 May 2021 13:33) (20 - 26)  SpO2: 96% (05 May 2021 13:33) (93% - 100%)      General: Not in distress  HEENT:  FERMÍN              Lymphatic system: No LN  Lungs: Bilateral BS  Cardiovascular: Regular  Gastrointestinal: Soft, Positive BS  Musculoskeletal: No clubbing.  Moves all extremities.    Skin: Warm.  Intact  Neurological: No motor or sensory deficit         LABS:                          10.1   17.94 )-----------( 229      ( 05 May 2021 11:32 )             35.0                                               05-    139  |  101  |  41<H>  ----------------------------<  254<H>  4.5   |  28  |  3.08<H>    Ca    8.7      05 May 2021 11:31    TPro  7.8  /  Alb  4.0  /  TBili  0.2  /  DBili  x   /  AST  71<H>  /  ALT  50<H>  /  AlkPhos  94  05-05      PT/INR - ( 05 May 2021 11:32 )   PT: 13.1 sec;   INR: 1.10          PTT - ( 05 May 2021 11:32 )  PTT:28.4 sec                                       Urinalysis Basic - ( 05 May 2021 12:03 )    Color: Yellow / Appearance: Clear / S.025 / pH: x  Gluc: x / Ketone: NEGATIVE  / Bili: Negative / Urobili: 0.2 E.U./dL   Blood: x / Protein: >=300 mg/dL / Nitrite: NEGATIVE   Leuk Esterase: NEGATIVE / RBC: < 5 /HPF / WBC < 5 /HPF   Sq Epi: x / Non Sq Epi: 0-5 /HPF / Bacteria: Present /HPF        CARDIAC MARKERS ( 05 May 2021 11:31 )  x     / 0.09 ng/mL / 417 U/L / x     / x                                                LIVER FUNCTIONS - ( 05 May 2021 11:31 )  Alb: 4.0 g/dL / Pro: 7.8 g/dL / ALK PHOS: 94 U/L / ALT: 50 U/L / AST: 71 U/L / GGT: x                                                                                                                                           CXR:    ECHO:    MEDICATIONS  (STANDING):  nitroglycerin  Infusion 50 MICROgram(s)/Min (15 mL/Hr) IV Continuous <Continuous>    MEDICATIONS  (PRN):         70M PMH CHF CAD s/p multivessel stents with MIs (in , denies stents), PAD with ?BL LE stenting? in the , history of water in the lungs, CKD, DM2,thyroid goiter, emphysema/asthma/ COPD/former smoker, on home O2 (COPD)  BIBEMS for SoB, per EMS SBP 240s at the scene. Per pt's cardiologist the pt has a hx of recurrent pulmonary edema episodes requiring bipap and diuresis. Pt reports at baseline does not walk much (<2blocks) due to Glass. No HA, CP/pressure, URI, cough, n/v/d/c, diarrhea or constipation, polyuric which he attributes to water pill, no dysuria, no LE swelling. Never worn BiPAP before. No recent hospitalizations, no recent illnesses. Forgot to take his AM meds. Per PCP in 2021: Cre 3.26, BUN 35  Meds: folic acid 1mg, isosorbide mononitrate 60mg, toresemide 20, amlodipine 5mg, Clonidine 0.1mg, losartan 100mg, allopurinol 100, coreg 25hydralazine 100, [id number], cilastazol 50, ASA 81, lipitor 80, tamsulosin 0.4, finasteride 5mg     T96.8, -96. -163/131-83, RR 24-26. 100% on BIPAP   EKG: AF-, QTc 583   WBC 17.9, Hb 10, Plt 229  ddimer 600, fibrinogen 479, BUN 41, Cre 3, AST 50, ALT 71, ALT 50, Alk phos 94, CRP 7, pro-stacy 0.18, trop 0.09, pro-bnp 3570   pH 7.12, pCO2 90, UA: protein >300,   ED echo: Multiple B-lines visualized with severe global wall motion abnormality.  ED interventions: lasix 40, solu-medrol 125, nitroglycerin gtt. Pt initially somnolent now feeling "much better".         Patient is a 70y old  Male who presents with a chief complaint of     HPI:      Allergies    No Known Allergies    Intolerances      Home Medications:      SOCIAL HX:     Smoking          ETOH/Illicit drugs          Occupation    PAST MEDICAL & SURGICAL HISTORY:      FAMILY HISTORY:  :    No known cardiovascular or pulmonary family history     ROS:  See HPI     PHYSICAL EXAM    ICU Vital Signs Last 24 Hrs  T(C): 36 (05 May 2021 11:15), Max: 36 (05 May 2021 11:15)  T(F): 96.8 (05 May 2021 11:15), Max: 96.8 (05 May 2021 11:15)  HR: 75 (05 May 2021 13:33) (75 - 131)  BP: 137/83 (05 May 2021 13:33) (137/83 - 235/131)  BP(mean): --  ABP: --  ABP(mean): --  RR: 20 (05 May 2021 13:33) (20 - 26)  SpO2: 96% (05 May 2021 13:33) (93% - 100%)      General: Not in distress  HEENT:  FERMÍN              Lymphatic system: No LN  Lungs: Bilateral BS  Cardiovascular: Regular  Gastrointestinal: Soft, Positive BS  Musculoskeletal: No clubbing.  Moves all extremities.    Skin: Warm.  Intact  Neurological: No motor or sensory deficit         LABS:                          10.1   17.94 )-----------( 229      ( 05 May 2021 11:32 )             35.0                                               05-    139  |  101  |  41<H>  ----------------------------<  254<H>  4.5   |  28  |  3.08<H>    Ca    8.7      05 May 2021 11:31    TPro  7.8  /  Alb  4.0  /  TBili  0.2  /  DBili  x   /  AST  71<H>  /  ALT  50<H>  /  AlkPhos  94  05-05      PT/INR - ( 05 May 2021 11:32 )   PT: 13.1 sec;   INR: 1.10          PTT - ( 05 May 2021 11:32 )  PTT:28.4 sec                                       Urinalysis Basic - ( 05 May 2021 12:03 )    Color: Yellow / Appearance: Clear / S.025 / pH: x  Gluc: x / Ketone: NEGATIVE  / Bili: Negative / Urobili: 0.2 E.U./dL   Blood: x / Protein: >=300 mg/dL / Nitrite: NEGATIVE   Leuk Esterase: NEGATIVE / RBC: < 5 /HPF / WBC < 5 /HPF   Sq Epi: x / Non Sq Epi: 0-5 /HPF / Bacteria: Present /HPF        CARDIAC MARKERS ( 05 May 2021 11:31 )  x     / 0.09 ng/mL / 417 U/L / x     / x                                                LIVER FUNCTIONS - ( 05 May 2021 11:31 )  Alb: 4.0 g/dL / Pro: 7.8 g/dL / ALK PHOS: 94 U/L / ALT: 50 U/L / AST: 71 U/L / GGT: x                                                                                                                                           CXR:    ECHO:    MEDICATIONS  (STANDING):  nitroglycerin  Infusion 50 MICROgram(s)/Min (15 mL/Hr) IV Continuous <Continuous>    MEDICATIONS  (PRN):

## 2021-05-05 NOTE — CONSULT NOTE ADULT - ASSESSMENT
70M PMH CHF CAD with MIs (in 1990s, denies stents), PAD with ?bilateral LE stenting? in the 90s, CKD, DM2,thyroid goiter, emphysema/asthma/ COPD/former smoker, on home O2 who presents with acute shortness of breath, found to be in hypoxic hypercapnic respiratory failure in the setting of likely COPD exacerbation.     #Hypoxic hypercapnic respiratory failure   Likely due to COPD exacerbation, ph 7.12, pCO2 90. Likely had a flash pulmonary edema event in the setting of hypertensive urgency. S/p solumedrol, lasix and nitroglycerin and improving on BiPAP   - recommend medicine consult for management of likely COPD exacerbation; c/w BiPAP     Case discussed with CCU attending.   Adam Mosquera MD   Cardiology Fellow  70M PMH CHF CAD with MIs (in 1990s, denies stents), PAD with ?bilateral LE stenting? in the 90s, CKD, DM2,thyroid goiter, emphysema/asthma/ COPD/former smoker, on home O2 who presents with acute shortness of breath, found to be in hypoxic hypercapnic respiratory failure in the setting of likely COPD exacerbation.     #Hypoxic hypercapnic respiratory failure   Likely due to COPD exacerbation, ph 7.12, pCO2 90. Likely had a flash pulmonary edema event in the setting of hypertensive urgency. S/p solumedrol, lasix and nitroglycerin and improving on BiPAP   - recommend medicine consult for management of likely COPD exacerbation; c/w BiPAP     #Elevated trops   - trend trop to peak with CK/CK-MB  - recommend official TTE     Case discussed with CCU attending. Recommend medicine evaluation. Please call cardiology consult for further management if needed.   Adam Mosquera MD   Cardiology Fellow  70M PMH CHF CAD with MIs (in 1990s, denies stents), PAD with ?bilateral LE stenting? in the 90s, CKD, DM2,thyroid goiter, emphysema/asthma/ COPD/former smoker, on home O2 who presents with acute shortness of breath, found to be in hypoxic hypercapnic respiratory failure.     #Hypoxic hypercapnic respiratory failure   Possibly due to COPD exacerbation, ph 7.12, pCO2 90. Likely had a flash pulmonary edema event in the setting of hypertensive urgency. S/p solumedrol, lasix and nitroglycerin and improving on BiPAP   - recommend medicine consult for management of likely COPD exacerbation; c/w BiPAP     #Elevated trops   - trend trop to peak with CK/CK-MB  - recommend official TTE   - likely will need ischemic evaluation if trop trending up     Case discussed with CCU attending and TELE attending. Recommend medicine evaluation. If not, appropriate for TELE monitoring  Adam Mosquera MD   Cardiology Fellow

## 2021-05-05 NOTE — H&P ADULT - NSHPLABSRESULTS_GEN_ALL_CORE
10.1   17.94 )-----------( 229      ( 05 May 2021 11:32 )             35.0     139  |  101  |  41<H>  ----------------------------<  254<H>  4.5   |  28  |  3.08<H>    Ca    8.7      05 May 2021 11:31    TPro  7.8  /  Alb  4.0  /  TBili  0.2  /  DBili  x   /  AST  71<H>  /  ALT  50<H>  /  AlkPhos  94        PT/INR - ( 05 May 2021 11:32 )   PT: 13.1 sec;   INR: 1.10          PTT - ( 05 May 2021 11:32 )  PTT:28.4 sec    CARDIAC MARKERS ( 05 May 2021 16:34 )  x     / x     / 371 U/L / x     / 11.6 ng/mL  CARDIAC MARKERS ( 05 May 2021 11:31 )  x     / 0.09 ng/mL / 417 U/L / x     / x            Urinalysis Basic - ( 05 May 2021 12:03 )    Color: Yellow / Appearance: Clear / S.025 / pH: x  Gluc: x / Ketone: NEGATIVE  / Bili: Negative / Urobili: 0.2 E.U./dL   Blood: x / Protein: >=300 mg/dL / Nitrite: NEGATIVE   Leuk Esterase: NEGATIVE / RBC: < 5 /HPF / WBC < 5 /HPF   Sq Epi: x / Non Sq Epi: 0-5 /HPF / Bacteria: Present /HPF        EKG: sinus rhythm; TWI in inferolateral leads. 10.1   17.94 )-----------( 229      ( 05 May 2021 11:32 )             35.0     139  |  101  |  41<H>  ----------------------------<  254<H>  4.5   |  28  |  3.08<H>    Ca    8.7      05 May 2021 11:31    TPro  7.8  /  Alb  4.0  /  TBili  0.2  /  DBili  x   /  AST  71<H>  /  ALT  50<H>  /  AlkPhos  94        PT/INR - ( 05 May 2021 11:32 )   PT: 13.1 sec;   INR: 1.10          PTT - ( 05 May 2021 11:32 )  PTT:28.4 sec    CARDIAC MARKERS ( 05 May 2021 16:34 )  x     / x     / 371 U/L / x     / 11.6 ng/mL  CARDIAC MARKERS ( 05 May 2021 11:31 )  x     / 0.09 ng/mL / 417 U/L / x     / x            Urinalysis Basic - ( 05 May 2021 12:03 )    Color: Yellow / Appearance: Clear / S.025 / pH: x  Gluc: x / Ketone: NEGATIVE  / Bili: Negative / Urobili: 0.2 E.U./dL   Blood: x / Protein: >=300 mg/dL / Nitrite: NEGATIVE   Leuk Esterase: NEGATIVE / RBC: < 5 /HPF / WBC < 5 /HPF   Sq Epi: x / Non Sq Epi: 0-5 /HPF / Bacteria: Present /HPF        EKG: sinus rhythm; TWI in inferolateral leads (changes from previous on admit).

## 2021-05-05 NOTE — ED ADULT TRIAGE NOTE - CHIEF COMPLAINT QUOTE
Pt brought in by EMS after bystander called because pt was c/o shortness of breath. Pt on CPAP on arrival, tachypneic, diaphoretic. Reports shortness of breath and chest pressure. Denies recent fevers, chills. Notification called by EMS @1050.

## 2021-05-06 DIAGNOSIS — N18.4 CHRONIC KIDNEY DISEASE, STAGE 4 (SEVERE): ICD-10-CM

## 2021-05-06 DIAGNOSIS — I50.23 ACUTE ON CHRONIC SYSTOLIC (CONGESTIVE) HEART FAILURE: ICD-10-CM

## 2021-05-06 DIAGNOSIS — I48.91 UNSPECIFIED ATRIAL FIBRILLATION: ICD-10-CM

## 2021-05-06 LAB
A1C WITH ESTIMATED AVERAGE GLUCOSE RESULT: 4.7 % — SIGNIFICANT CHANGE UP (ref 4–5.6)
ANION GAP SERPL CALC-SCNC: 12 MMOL/L — SIGNIFICANT CHANGE UP (ref 5–17)
APTT BLD: 122.6 SEC — CRITICAL HIGH (ref 27.5–35.5)
APTT BLD: 53.2 SEC — HIGH (ref 27.5–35.5)
APTT BLD: 65.2 SEC — HIGH (ref 27.5–35.5)
APTT BLD: 80 SEC — HIGH (ref 27.5–35.5)
BUN SERPL-MCNC: 47 MG/DL — HIGH (ref 7–23)
CALCIUM SERPL-MCNC: 8.7 MG/DL — SIGNIFICANT CHANGE UP (ref 8.4–10.5)
CHLORIDE SERPL-SCNC: 102 MMOL/L — SIGNIFICANT CHANGE UP (ref 96–108)
CHOLEST SERPL-MCNC: 133 MG/DL — SIGNIFICANT CHANGE UP
CK MB CFR SERPL CALC: 9.9 NG/ML — HIGH (ref 0–6.7)
CK SERPL-CCNC: 258 U/L — HIGH (ref 30–200)
CO2 SERPL-SCNC: 26 MMOL/L — SIGNIFICANT CHANGE UP (ref 22–31)
COVID-19 SPIKE DOMAIN AB INTERP: POSITIVE
COVID-19 SPIKE DOMAIN ANTIBODY RESULT: >250 U/ML — HIGH
CREAT SERPL-MCNC: 3.02 MG/DL — HIGH (ref 0.5–1.3)
CULTURE RESULTS: NO GROWTH — SIGNIFICANT CHANGE UP
ESTIMATED AVERAGE GLUCOSE: 88 MG/DL — SIGNIFICANT CHANGE UP (ref 68–114)
GLUCOSE SERPL-MCNC: 162 MG/DL — HIGH (ref 70–99)
HCT VFR BLD CALC: 31.8 % — LOW (ref 39–50)
HCT VFR BLD CALC: 32 % — LOW (ref 39–50)
HCV AB S/CO SERPL IA: 0.04 S/CO — SIGNIFICANT CHANGE UP
HCV AB SERPL-IMP: SIGNIFICANT CHANGE UP
HDLC SERPL-MCNC: 45 MG/DL — SIGNIFICANT CHANGE UP
HGB BLD-MCNC: 9.4 G/DL — LOW (ref 13–17)
HGB BLD-MCNC: 9.7 G/DL — LOW (ref 13–17)
INR BLD: 1.19 — HIGH (ref 0.88–1.16)
LIPID PNL WITH DIRECT LDL SERPL: 79 MG/DL — SIGNIFICANT CHANGE UP
MAGNESIUM SERPL-MCNC: 1.8 MG/DL — SIGNIFICANT CHANGE UP (ref 1.6–2.6)
MCHC RBC-ENTMCNC: 27.5 PG — SIGNIFICANT CHANGE UP (ref 27–34)
MCHC RBC-ENTMCNC: 27.9 PG — SIGNIFICANT CHANGE UP (ref 27–34)
MCHC RBC-ENTMCNC: 29.6 GM/DL — LOW (ref 32–36)
MCHC RBC-ENTMCNC: 30.3 GM/DL — LOW (ref 32–36)
MCV RBC AUTO: 92 FL — SIGNIFICANT CHANGE UP (ref 80–100)
MCV RBC AUTO: 93 FL — SIGNIFICANT CHANGE UP (ref 80–100)
NON HDL CHOLESTEROL: 88 MG/DL — SIGNIFICANT CHANGE UP
NRBC # BLD: 0 /100 WBCS — SIGNIFICANT CHANGE UP (ref 0–0)
NRBC # BLD: 0 /100 WBCS — SIGNIFICANT CHANGE UP (ref 0–0)
PLATELET # BLD AUTO: 173 K/UL — SIGNIFICANT CHANGE UP (ref 150–400)
PLATELET # BLD AUTO: 179 K/UL — SIGNIFICANT CHANGE UP (ref 150–400)
POTASSIUM SERPL-MCNC: 4.3 MMOL/L — SIGNIFICANT CHANGE UP (ref 3.5–5.3)
POTASSIUM SERPL-SCNC: 4.3 MMOL/L — SIGNIFICANT CHANGE UP (ref 3.5–5.3)
PROTHROM AB SERPL-ACNC: 14.2 SEC — HIGH (ref 10.6–13.6)
RBC # BLD: 3.42 M/UL — LOW (ref 4.2–5.8)
RBC # BLD: 3.48 M/UL — LOW (ref 4.2–5.8)
RBC # FLD: 13.6 % — SIGNIFICANT CHANGE UP (ref 10.3–14.5)
RBC # FLD: 13.7 % — SIGNIFICANT CHANGE UP (ref 10.3–14.5)
SARS-COV-2 IGG+IGM SERPL QL IA: >250 U/ML — HIGH
SARS-COV-2 IGG+IGM SERPL QL IA: POSITIVE
SODIUM SERPL-SCNC: 140 MMOL/L — SIGNIFICANT CHANGE UP (ref 135–145)
SPECIMEN SOURCE: SIGNIFICANT CHANGE UP
TRIGL SERPL-MCNC: 43 MG/DL — SIGNIFICANT CHANGE UP
TROPONIN T SERPL-MCNC: 0.14 NG/ML — CRITICAL HIGH (ref 0–0.01)
WBC # BLD: 7.75 K/UL — SIGNIFICANT CHANGE UP (ref 3.8–10.5)
WBC # BLD: 9.13 K/UL — SIGNIFICANT CHANGE UP (ref 3.8–10.5)
WBC # FLD AUTO: 7.75 K/UL — SIGNIFICANT CHANGE UP (ref 3.8–10.5)
WBC # FLD AUTO: 9.13 K/UL — SIGNIFICANT CHANGE UP (ref 3.8–10.5)

## 2021-05-06 PROCEDURE — 99233 SBSQ HOSP IP/OBS HIGH 50: CPT

## 2021-05-06 PROCEDURE — 99223 1ST HOSP IP/OBS HIGH 75: CPT

## 2021-05-06 PROCEDURE — 76775 US EXAM ABDO BACK WALL LIM: CPT | Mod: 26

## 2021-05-06 RX ORDER — SODIUM CHLORIDE 9 MG/ML
1000 INJECTION, SOLUTION INTRAVENOUS
Refills: 0 | Status: DISCONTINUED | OUTPATIENT
Start: 2021-05-06 | End: 2021-05-15

## 2021-05-06 RX ORDER — DEXTROSE 50 % IN WATER 50 %
25 SYRINGE (ML) INTRAVENOUS ONCE
Refills: 0 | Status: DISCONTINUED | OUTPATIENT
Start: 2021-05-06 | End: 2021-05-15

## 2021-05-06 RX ORDER — DEXTROSE 50 % IN WATER 50 %
12.5 SYRINGE (ML) INTRAVENOUS ONCE
Refills: 0 | Status: DISCONTINUED | OUTPATIENT
Start: 2021-05-06 | End: 2021-05-15

## 2021-05-06 RX ORDER — INSULIN LISPRO 100/ML
VIAL (ML) SUBCUTANEOUS
Refills: 0 | Status: DISCONTINUED | OUTPATIENT
Start: 2021-05-06 | End: 2021-05-15

## 2021-05-06 RX ORDER — GLUCAGON INJECTION, SOLUTION 0.5 MG/.1ML
1 INJECTION, SOLUTION SUBCUTANEOUS ONCE
Refills: 0 | Status: DISCONTINUED | OUTPATIENT
Start: 2021-05-06 | End: 2021-05-15

## 2021-05-06 RX ORDER — AMLODIPINE BESYLATE 2.5 MG/1
10 TABLET ORAL DAILY
Refills: 0 | Status: DISCONTINUED | OUTPATIENT
Start: 2021-05-06 | End: 2021-05-15

## 2021-05-06 RX ORDER — MAGNESIUM OXIDE 400 MG ORAL TABLET 241.3 MG
800 TABLET ORAL ONCE
Refills: 0 | Status: COMPLETED | OUTPATIENT
Start: 2021-05-06 | End: 2021-05-06

## 2021-05-06 RX ORDER — DEXTROSE 50 % IN WATER 50 %
15 SYRINGE (ML) INTRAVENOUS ONCE
Refills: 0 | Status: DISCONTINUED | OUTPATIENT
Start: 2021-05-06 | End: 2021-05-15

## 2021-05-06 RX ADMIN — Medication 1.5 MICROGRAM(S)/MIN: at 08:16

## 2021-05-06 RX ADMIN — CLOPIDOGREL BISULFATE 75 MILLIGRAM(S): 75 TABLET, FILM COATED ORAL at 11:27

## 2021-05-06 RX ADMIN — HEPARIN SODIUM 1600 UNIT(S)/HR: 5000 INJECTION INTRAVENOUS; SUBCUTANEOUS at 23:25

## 2021-05-06 RX ADMIN — MAGNESIUM OXIDE 400 MG ORAL TABLET 800 MILLIGRAM(S): 241.3 TABLET ORAL at 11:57

## 2021-05-06 RX ADMIN — HEPARIN SODIUM 1400 UNIT(S)/HR: 5000 INJECTION INTRAVENOUS; SUBCUTANEOUS at 02:21

## 2021-05-06 RX ADMIN — ATORVASTATIN CALCIUM 80 MILLIGRAM(S): 80 TABLET, FILM COATED ORAL at 21:58

## 2021-05-06 RX ADMIN — Medication 100 MILLIGRAM(S): at 21:58

## 2021-05-06 RX ADMIN — BUDESONIDE AND FORMOTEROL FUMARATE DIHYDRATE 2 PUFF(S): 160; 4.5 AEROSOL RESPIRATORY (INHALATION) at 23:24

## 2021-05-06 RX ADMIN — CEFTRIAXONE 1000 MILLIGRAM(S): 500 INJECTION, POWDER, FOR SOLUTION INTRAMUSCULAR; INTRAVENOUS at 22:00

## 2021-05-06 RX ADMIN — Medication 100 MILLIGRAM(S): at 06:46

## 2021-05-06 RX ADMIN — CARVEDILOL PHOSPHATE 25 MILLIGRAM(S): 80 CAPSULE, EXTENDED RELEASE ORAL at 17:52

## 2021-05-06 RX ADMIN — Medication 40 MILLIGRAM(S): at 17:52

## 2021-05-06 RX ADMIN — AZITHROMYCIN 250 MILLIGRAM(S): 500 TABLET, FILM COATED ORAL at 11:25

## 2021-05-06 RX ADMIN — Medication 81 MILLIGRAM(S): at 11:27

## 2021-05-06 RX ADMIN — BUDESONIDE AND FORMOTEROL FUMARATE DIHYDRATE 2 PUFF(S): 160; 4.5 AEROSOL RESPIRATORY (INHALATION) at 11:25

## 2021-05-06 RX ADMIN — Medication 100 MILLIGRAM(S): at 16:03

## 2021-05-06 RX ADMIN — CARVEDILOL PHOSPHATE 25 MILLIGRAM(S): 80 CAPSULE, EXTENDED RELEASE ORAL at 06:46

## 2021-05-06 RX ADMIN — TAMSULOSIN HYDROCHLORIDE 0.4 MILLIGRAM(S): 0.4 CAPSULE ORAL at 21:58

## 2021-05-06 RX ADMIN — AMLODIPINE BESYLATE 10 MILLIGRAM(S): 2.5 TABLET ORAL at 11:25

## 2021-05-06 RX ADMIN — Medication 8: at 21:57

## 2021-05-06 RX ADMIN — Medication 100 MILLIGRAM(S): at 17:52

## 2021-05-06 RX ADMIN — Medication 40 MILLIGRAM(S): at 06:45

## 2021-05-06 RX ADMIN — HEPARIN SODIUM 1600 UNIT(S)/HR: 5000 INJECTION INTRAVENOUS; SUBCUTANEOUS at 08:15

## 2021-05-06 NOTE — PROGRESS NOTE ADULT - PROBLEM SELECTOR PLAN 6
Presumed to be new onset. No history of Afib and not currently on AC at home  -Currently in NSR.  Continue to monitor HR

## 2021-05-06 NOTE — CONSULT NOTE ADULT - ASSESSMENT
Nephrology consulted for elevated creatinine in 69yo M w/ PMHx of CKD (unknown baseline), HLD, DM T2, uncontrolled HTN, CAD (questionable Hx of stents? pt denies but outpatient may have more info??), PAD (pt reporting stents in L lower extremity), HFrEF (EF 23% in 2017), COPD (pt w/ home O2 via NC PRN), who presented to [facility] ED on 5/5/21 in acute respiratory distress suspected in the setting of HTN emergency. Respiratory distress improved w/ Lasix 40 IV x1 and BiPAP, now on High Flow nasal cannula and diuresing w/ Lasix 40 IV BID. TTE showing LVEF 25-30%. Once patient euvolemic possible cardiac cath.       Nephrology consulted for CKD stage 4 in 69yo M who admitted with hypertensive emergency, heart failure exacerbation with plan for possible cardiac cath once volume status optimized.      # Nonoliguric CKD stage 4 (eGFR 15-30 ml/min)  - baseline sCr in 3's, currently at 3.0- appears to be at baseline, stable  - c/w current diuretic regimen, monitor lytes, supplement prn to keep K >4, Mg >2  - high risk for SANJAY, however if benefits outweigh the risks consider holding diuretics to keep patient euvolemic as well as ACE/ARBs, use less contrast media possible   - send urinalysis, urine protein-creatinine ratio, ulytes (Na, Cr, Urea)  - send for renal sono  - obtain PVR bladder scan to r/o retention   - avoid ACE/ARB/NSAIDS  - renally adjusted meds/ ABx (CrCl <30 ml/min)  - daily weight/ strict in/outs as per CHF exacerbation   - renal diet  HTN- reconcile and restart home meds  Anemia- obtain iron panel  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

## 2021-05-06 NOTE — PROGRESS NOTE ADULT - PROBLEM SELECTOR PLAN 3
History of systolic CHF EF 23% by echo 2017.  -- TTE (5/5/21): limited study; LVEF 25-30%, mod-severe hypokinesis in all segments other than basal and mid anteroseptum segments.   -- HOME: Torsemide 20mg PO QD. Continue  Lasix 40 mg IV BID diuresing well net negative 2.78 L in 24 hrs. Ross originally placed in ER now discontinued with condom catheter placed.   -Is/Os, Daily Weights. 1 L Fluid restriction. Core Measures.

## 2021-05-06 NOTE — PROGRESS NOTE ADULT - PROBLEM SELECTOR PLAN 2
-- Pt reports prior to presentation he had substernal chest pain. Patient currently C/P free and HD stable   -- Denying Hx of cardiac stents, but outpatient provider mentioned he may have had stents/MI in past.   -- EKG developed TWI in inferolateral leads. Peak Troponin 0.16 trended down to 0.14.   -- s/p ASA 325mg PO x1 and Plavix 600mg PO x1 load.   -- CONT: Heparin gtt, ASA 81mg PO Daily,  Plavix 75mg PO Daily, Coreg 25mg PO BID, Atorvastatin 80mg PO QHS.   -- PLAN: medical management of NSTEMI at this time; patient remains ASYMPTOMATIC; monitor for symptoms and trend Troponin T to peak w/ serial EKGs to monitor for dynamic EKG developments; once pt euvolemic, Plan for cardiac cath.

## 2021-05-06 NOTE — PROGRESS NOTE ADULT - PROBLEM SELECTOR PLAN 4
Baseline Cr unknown. Cr 3.08 on admission. GFR 20s.  -Renal consulted.  -U/A+ 5/5/2021 +protein and blood. Negative leukocyte esterase and nitrites.   -Urine Cx 5/5/2021 -no growth.   -Will call to obtain collateral information regarding baseline Cr.  -Renally dose meds.  -AVOID NEPHROTOXIC AGENTS  -Daily Phosphorous. Currently 4.1

## 2021-05-06 NOTE — PROGRESS NOTE ADULT - PROBLEM SELECTOR PLAN 5
Leukocytosis on admission WBC 17 with left shift. Now resolved. Patient currently afebrile.   -Patient reports productive cough with white sputum.   -CXR 5/5/2021: Bilateral opacities/right pleural effusion.  -- ABX: Ceftriaxone 1000mg IV q 24hrs x 7 days and Azithromycin 500mg PO Daily x 1 followed by Azithromycin 250mg PO Daily x 4 days.   -- Continue to monitor.

## 2021-05-06 NOTE — CONSULT NOTE ADULT - SUBJECTIVE AND OBJECTIVE BOX
Nephrology consulted for elevated creatinine in 69yo M w/ PMHx of CKD (unknown baseline), HLD, DM T2, uncontrolled HTN, CAD (questionable Hx of stents? pt denies but outpatient may have more info??), PAD (pt reporting stents in L lower extremity), HFrEF (EF 23% in 2017), COPD (pt w/ home O2 via [place] PRN), who presented to [facility] ED on 21 in acute respiratory distress suspected in the setting of HTN emergency. Respiratory distress improved w/ Lasix 40 IV x1 and BiPAP, now on High Flow nasal cannula and diuresing w/ Lasix 40 IV BID. TTE showing LVEF 25-30%. Once patient euvolemic possible cardiac cath.     PAST MEDICAL & SURGICAL HISTORY:      Allergies    lisinopril (Anaphylaxis)    Intolerances        FAMILY HISTORY:      SOCIAL HISTORY:      MEDICATIONS  (STANDING):  allopurinol 100 milliGRAM(s) Oral two times a day  amLODIPine   Tablet 10 milliGRAM(s) Oral daily  aspirin  chewable 81 milliGRAM(s) Oral daily  atorvastatin 80 milliGRAM(s) Oral at bedtime  azithromycin   Tablet 250 milliGRAM(s) Oral daily  budesonide  80 MICROgram(s)/formoterol 4.5 MICROgram(s) Inhaler 2 Puff(s) Inhalation two times a day  carvedilol 25 milliGRAM(s) Oral every 12 hours  cefTRIAXone Injectable. 1000 milliGRAM(s) IV Push every 24 hours  clopidogrel Tablet 75 milliGRAM(s) Oral daily  dextrose 40% Gel 15 Gram(s) Oral once  dextrose 5%. 1000 milliLiter(s) (50 mL/Hr) IV Continuous <Continuous>  dextrose 5%. 1000 milliLiter(s) (100 mL/Hr) IV Continuous <Continuous>  dextrose 50% Injectable 25 Gram(s) IV Push once  dextrose 50% Injectable 12.5 Gram(s) IV Push once  dextrose 50% Injectable 25 Gram(s) IV Push once  furosemide   Injectable 40 milliGRAM(s) IV Push every 12 hours  glucagon  Injectable 1 milliGRAM(s) IntraMuscular once  heparin  Infusion.  Unit(s)/Hr (16 mL/Hr) IV Continuous <Continuous>  hydrALAZINE 100 milliGRAM(s) Oral every 8 hours  insulin lispro (ADMELOG) corrective regimen sliding scale   SubCutaneous Before meals and at bedtime  tamsulosin 0.4 milliGRAM(s) Oral at bedtime    MEDICATIONS  (PRN):  heparin   Injectable 7000 Unit(s) IV Push every 6 hours PRN For aPTT less than 40  heparin   Injectable 3500 Unit(s) IV Push every 6 hours PRN For aPTT between 40 - 57      Vital Signs Last 24 Hrs  T(C): 36.2 (06 May 2021 10:11), Max: 36.8 (06 May 2021 03:00)  T(F): 97.1 (06 May 2021 10:11), Max: 98.2 (06 May 2021 03:00)  HR: 77 (06 May 2021 08:42) (70 - 97)  BP: 138/65 (06 May 2021 08:33) (137/83 - 191/105)  BP(mean): --  RR: 19 (06 May 2021 08:42) (17 - 26)  SpO2: 96% (06 May 2021 08:42) (94% - 100%)    REVIEW OF SYSTEMS:  Gen: No fever  CVS: No chest pain  Resp: No shortness of breath  Abd: No nausea/ No vomiting/No abdominal pain  CNS: No headache      PHYSICAL EXAM:  GENERAL: well-developed, well nourished, alert, no acute distress at present  NECK: supple, No JVD  CHEST/LUNG: Clear to auscultation bilaterally  HEART: normal S1S2, RRR  ABDOMEN: Soft, Nontender, +BS, No flank tenderness  EXTREMITIES: No clubbing, cyanosis, or edema, no calf tenderness bilateral  Neurology: AAOx3, no focal neurological deficit  SKIN: No rashes  ACCESS: good thrill and bruit appreciated      CAPILLARY BLOOD GLUCOSE      POCT Blood Glucose.: 133 mg/dL (06 May 2021 11:53)      I&O's Summary    05 May 2021 07:01  -  06 May 2021 07:00  --------------------------------------------------------  IN: 168 mL / OUT: 2950 mL / NET: -2782 mL    06 May 2021 07:01  -  06 May 2021 12:46  --------------------------------------------------------  IN: 0 mL / OUT: 1300 mL / NET: -1300 mL          LABS:                            9.4    9.13  )-----------( 179      ( 06 May 2021 07:03 )             31.8       05-06    140  |  102  |  47<H>  ----------------------------<  162<H>  4.3   |  26  |  3.02<H>    Ca    8.7      06 May 2021 07:03  Phos  4.1     05-06  Mg     1.8     05-06    TPro  7.8  /  Alb  4.0  /  TBili  0.2  /  DBili  x   /  AST  71<H>  /  ALT  50<H>  /  AlkPhos  94  05-05      PT/INR - ( 06 May 2021 07:03 )   PT: 14.2 sec;   INR: 1.19          PTT - ( 06 May 2021 07:03 )  PTT:53.2 sec    Urinalysis Basic - ( 05 May 2021 12:03 )    Color: Yellow / Appearance: Clear / S.025 / pH: x  Gluc: x / Ketone: NEGATIVE  / Bili: Negative / Urobili: 0.2 E.U./dL   Blood: x / Protein: >=300 mg/dL / Nitrite: NEGATIVE   Leuk Esterase: NEGATIVE / RBC: < 5 /HPF / WBC < 5 /HPF   Sq Epi: x / Non Sq Epi: 0-5 /HPF / Bacteria: Present /HPF        CARDIAC MARKERS ( 06 May 2021 07:03 )  x     / 0.14 ng/mL / 258 U/L / x     / 9.9 ng/mL  CARDIAC MARKERS ( 05 May 2021 22:39 )  x     / 0.16 ng/mL / 326 U/L / x     / 12.2 ng/mL  CARDIAC MARKERS ( 05 May 2021 16:34 )  x     / 0.14 ng/mL / 371 U/L / x     / 11.6 ng/mL  CARDIAC MARKERS ( 05 May 2021 11:31 )  x     / 0.09 ng/mL / 417 U/L / x     / x            RADIOLOGY & ADDITIONAL TESTS:  < from: Xray Chest 1 View-PORTABLE IMMEDIATE (21 @ 12:25) >  EXAM:  XR CHEST PORTABLE IMMED 1V                          PROCEDURE DATE:  2021          INTERPRETATION:  Clinical history/reason for exam: Fever.    Frontal chest.    No comparison.    Findings/  impression: Cardiomegaly, thoracic aortic calcification. Bilateral opacities/right pleural effusion. Thoracic spine degenerative changes, dextroscoliosis. Bilateral AC joint degenerative changes.    < end of copied text >               Nephrology consulted for CKD stage 4 in 69yo M who admitted with hypertensive emergency, heart failure exacerbation with plan for possible cardiac cath once volume status optimized.  PMHx of HLD, DM T2, uncontrolled HTN, CAD (questionable Hx of stents? pt denies but outpatient may have more info??), PAD (pt reporting stents in L lower extremity), HFrEF (EF 23% in 2017), COPD (pt w/ home O2 via [place] PRN).   Respiratory distress improved w/ Lasix 40 IV x1 and BiPAP, now on RA, diuresing w/ Lasix 40 IV BID.   TTE showing LVEF 25-30%.    PAST MEDICAL & SURGICAL HISTORY:  HTN, HLD, T2DM, CHF, CAD, PAD, COPD, BPH    Allergies    lisinopril (Anaphylaxis)    Intolerances        FAMILY HISTORY:  na    SOCIAL HISTORY:  former smoker    MEDICATIONS  (STANDING):  allopurinol 100 milliGRAM(s) Oral two times a day  amLODIPine   Tablet 10 milliGRAM(s) Oral daily  aspirin  chewable 81 milliGRAM(s) Oral daily  atorvastatin 80 milliGRAM(s) Oral at bedtime  azithromycin   Tablet 250 milliGRAM(s) Oral daily  budesonide  80 MICROgram(s)/formoterol 4.5 MICROgram(s) Inhaler 2 Puff(s) Inhalation two times a day  carvedilol 25 milliGRAM(s) Oral every 12 hours  cefTRIAXone Injectable. 1000 milliGRAM(s) IV Push every 24 hours  clopidogrel Tablet 75 milliGRAM(s) Oral daily  dextrose 40% Gel 15 Gram(s) Oral once  dextrose 5%. 1000 milliLiter(s) (50 mL/Hr) IV Continuous <Continuous>  dextrose 5%. 1000 milliLiter(s) (100 mL/Hr) IV Continuous <Continuous>  dextrose 50% Injectable 25 Gram(s) IV Push once  dextrose 50% Injectable 12.5 Gram(s) IV Push once  dextrose 50% Injectable 25 Gram(s) IV Push once  furosemide   Injectable 40 milliGRAM(s) IV Push every 12 hours  glucagon  Injectable 1 milliGRAM(s) IntraMuscular once  heparin  Infusion.  Unit(s)/Hr (16 mL/Hr) IV Continuous <Continuous>  hydrALAZINE 100 milliGRAM(s) Oral every 8 hours  insulin lispro (ADMELOG) corrective regimen sliding scale   SubCutaneous Before meals and at bedtime  tamsulosin 0.4 milliGRAM(s) Oral at bedtime    MEDICATIONS  (PRN):  heparin   Injectable 7000 Unit(s) IV Push every 6 hours PRN For aPTT less than 40  heparin   Injectable 3500 Unit(s) IV Push every 6 hours PRN For aPTT between 40 - 57      Vital Signs Last 24 Hrs  T(C): 36.2 (06 May 2021 10:11), Max: 36.8 (06 May 2021 03:00)  T(F): 97.1 (06 May 2021 10:11), Max: 98.2 (06 May 2021 03:00)  HR: 77 (06 May 2021 08:42) (70 - 97)  BP: 138/65 (06 May 2021 08:33) (137/83 - 191/105)  BP(mean): --  RR: 19 (06 May 2021 08:42) (17 - 26)  SpO2: 96% (06 May 2021 08:42) (94% - 100%)    REVIEW OF SYSTEMS:  Gen: No fever  CVS: No chest pain  Resp: No shortness of breath  Abd: No nausea/ No vomiting/No abdominal pain  CNS: No headache      PHYSICAL EXAM:  GENERAL: alert, no acute distress at present, on RA   NECK: supple, No JVD  CHEST/LUNG: decreased breath sounds bilaterally  HEART: normal S1S2, RRR  ABDOMEN: Soft, Nontender, +BS, No flank tenderness  EXTREMITIES: No LE edema bilateral  Neurology: AAOx3, no focal neurological deficit  SKIN: No rashes      CAPILLARY BLOOD GLUCOSE      POCT Blood Glucose.: 133 mg/dL (06 May 2021 11:53)      I&O's Summary    05 May 2021 07:01  -  06 May 2021 07:00  --------------------------------------------------------  IN: 168 mL / OUT: 2950 mL / NET: -2782 mL    06 May 2021 07:01  -  06 May 2021 12:46  --------------------------------------------------------  IN: 0 mL / OUT: 1300 mL / NET: -1300 mL          LABS:                            9.4    9.13  )-----------( 179      ( 06 May 2021 07:03 )             31.8       -    140  |  102  |  47<H>  ----------------------------<  162<H>  4.3   |  26  |  3.02<H>    Ca    8.7      06 May 2021 07:03  Phos  4.1     -  Mg     1.8     -    TPro  7.8  /  Alb  4.0  /  TBili  0.2  /  DBili  x   /  AST  71<H>  /  ALT  50<H>  /  AlkPhos  94  -05      PT/INR - ( 06 May 2021 07:03 )   PT: 14.2 sec;   INR: 1.19          PTT - ( 06 May 2021 07:03 )  PTT:53.2 sec    Urinalysis Basic - ( 05 May 2021 12:03 )    Color: Yellow / Appearance: Clear / S.025 / pH: x  Gluc: x / Ketone: NEGATIVE  / Bili: Negative / Urobili: 0.2 E.U./dL   Blood: x / Protein: >=300 mg/dL / Nitrite: NEGATIVE   Leuk Esterase: NEGATIVE / RBC: < 5 /HPF / WBC < 5 /HPF   Sq Epi: x / Non Sq Epi: 0-5 /HPF / Bacteria: Present /HPF        CARDIAC MARKERS ( 06 May 2021 07:03 )  x     / 0.14 ng/mL / 258 U/L / x     / 9.9 ng/mL  CARDIAC MARKERS ( 05 May 2021 22:39 )  x     / 0.16 ng/mL / 326 U/L / x     / 12.2 ng/mL  CARDIAC MARKERS ( 05 May 2021 16:34 )  x     / 0.14 ng/mL / 371 U/L / x     / 11.6 ng/mL  CARDIAC MARKERS ( 05 May 2021 11:31 )  x     / 0.09 ng/mL / 417 U/L / x     / x            RADIOLOGY & ADDITIONAL TESTS:  < from: Xray Chest 1 View-PORTABLE IMMEDIATE (21 @ 12:25) >  EXAM:  XR CHEST PORTABLE IMMED 1V                          PROCEDURE DATE:  2021          INTERPRETATION:  Clinical history/reason for exam: Fever.    Frontal chest.    No comparison.    Findings/  impression: Cardiomegaly, thoracic aortic calcification. Bilateral opacities/right pleural effusion. Thoracic spine degenerative changes, dextroscoliosis. Bilateral AC joint degenerative changes.    < end of copied text >

## 2021-05-06 NOTE — CONSULT NOTE ADULT - ATTENDING COMMENTS
71 yo man with CKD 4, HTN- BP controlled.  creat 3.0-  reported baseline  above average risk fo JOSE LUIS after radiocontrast dye load- needs hydration prior  complete renal evaluation as outlines above    HTN  Anemia  CKD-MBD
Pt with flash pulm edema in setting of Hypertensive urgency and known CAD and decreased LVEF. . No evidence of infection and weaned off BiPAP with improvement in gas exchange. Please recall Crit care prn.

## 2021-05-06 NOTE — PROGRESS NOTE ADULT - PROBLEM SELECTOR PLAN 1
-- Presented w/ HTN emergency (likely secondary to non-compliance as patient reports forgetting to take meds) /131 w/ acute respiratory failure 2/2 flash pulmonary edema likely due to HTN.   -- Pulmonary edema - pt received Lasix 40 IV x1 and placed on BiPAP in ER; Now on high flow nasal cannula with plan to titrate to Nasal Cannula or Room Air.   -- Started on Nitro gtt in the ER now titrated off. Goal SBP 160s.   -- CONT: home Coreg 25mg PO BID, Hydralazine 100mg PO TID. Amlodipine 10 mg daily added to regimen.   -- PLAN: continue to monitor BP, and reintroduce home regimen as appropriate.

## 2021-05-06 NOTE — PROGRESS NOTE ADULT - ASSESSMENT
Pt is 71yo non-compliant (reports forgetting to take meds) M w/ PMHx of HLD, DM T2, uncontrolled HTN, CAD (questionable Hx of stents? pt denies but outpatient may have more info??), PAD (pt reporting stents in L lower extremity), HFrEF (EF 23% in 2017), COPD (pt w/ home O2 via [place] PRN), CKD (unknown Cr baseline) who presented to [facility] ED on 5/5/21 in acute respiratory distress suspected in the setting of HTN emergency. Respiratory distress improved w/ Lasix 40 IV x1 and BiPAP, now on High Flow nasal cannula and diuresing w/ Lasix 40 IV BID. TTE showing LVEF 25-30%. HTN improved w/ Nitro gtt and Coreg/Hydralazine (SBP goal 160 at this time).  Initial EKG showed new onset Afib. Pt ruled in NSTEMI w/ Trop 0.09 --> 0.14 w/ development of TWI in inferolateral leads. Patient treated with ASA/Plavix/Heparin with peak Troponin 0.16 awaiting cardiac cath once euvolemic.

## 2021-05-06 NOTE — PROGRESS NOTE ADULT - SUBJECTIVE AND OBJECTIVE BOX
Interventional Cardiology PA Adult Progress Note    CC: HTN Emergency, NSTEMI, Acute on Chronic Systolic CHF     Subjective Assessment: Patient seen and examined at bedside. Patient reports feeling well and denies C/P, SOB, palpitations, dizziness, diaphoresis, N/V, dysuria, melena, BRBPR. He reports productive cough with white sputum.   	  MEDICATIONS:  amLODIPine   Tablet 10 milliGRAM(s) Oral daily  carvedilol 25 milliGRAM(s) Oral every 12 hours  furosemide   Injectable 40 milliGRAM(s) IV Push every 12 hours  hydrALAZINE 100 milliGRAM(s) Oral every 8 hours  tamsulosin 0.4 milliGRAM(s) Oral at bedtime  azithromycin   Tablet 250 milliGRAM(s) Oral daily  cefTRIAXone Injectable. 1000 milliGRAM(s) IV Push every 24 hours  budesonide  80 MICROgram(s)/formoterol 4.5 MICROgram(s) Inhaler 2 Puff(s) Inhalation two times a day  allopurinol 100 milliGRAM(s) Oral two times a day  atorvastatin 80 milliGRAM(s) Oral at bedtime  dextrose 40% Gel 15 Gram(s) Oral once  dextrose 50% Injectable 25 Gram(s) IV Push once  dextrose 50% Injectable 12.5 Gram(s) IV Push once  dextrose 50% Injectable 25 Gram(s) IV Push once  glucagon  Injectable 1 milliGRAM(s) IntraMuscular once  insulin lispro (ADMELOG) corrective regimen sliding scale   SubCutaneous Before meals and at bedtime  aspirin  chewable 81 milliGRAM(s) Oral daily  clopidogrel Tablet 75 milliGRAM(s) Oral daily  dextrose 5%. 1000 milliLiter(s) IV Continuous <Continuous>  dextrose 5%. 1000 milliLiter(s) IV Continuous <Continuous>  heparin   Injectable 7000 Unit(s) IV Push every 6 hours PRN  heparin   Injectable 3500 Unit(s) IV Push every 6 hours PRN  heparin  Infusion.  Unit(s)/Hr IV Continuous <Continuous>  magnesium oxide 800 milliGRAM(s) Oral once    [PHYSICAL EXAM:  TELEMETRY:  T(C): 36.2 (05-06-21 @ 10:11), Max: 36.8 (05-06-21 @ 03:00)  HR: 77 (05-06-21 @ 08:42) (70 - 131)  BP: 138/65 (05-06-21 @ 08:33) (137/83 - 235/131)  RR: 19 (05-06-21 @ 08:42) (17 - 26)  SpO2: 96% (05-06-21 @ 08:42) (93% - 100%)  Wt(kg): --  I&O's Summary    05 May 2021 07:01  -  06 May 2021 07:00  --------------------------------------------------------  IN: 168 mL / OUT: 2950 mL / NET: -2782 mL    Height (cm): 172.7 (05-06 @ 03:43)  Weight (kg): 90 (05-05 @ 11:13)  BMI (kg/m2): 30.2 (05-06 @ 03:43)  BSA (m2): 2.04 (05-06 @ 03:43)  Ross: No  Central/PICC/Mid Line: No                                         Appearance: Normal	  HEENT:   Normal oral mucosa, PERRL, EOMI	  Neck: Supple. No JVD.   Cardiovascular: + S1 S2. RRR. No murmurs.   Respiratory: Bibasilar rales. Scattered   Gastrointestinal:  Soft, Non-tender, + BS	  Skin: No rashes, No ecchymoses, No cyanosis  Extremities: Normal range of motion, No clubbing, cyanosis or edema  Vascular: Peripheral pulses palpable 2+ bilaterally  Neurologic: Non-focal  Psychiatry: A & O x 3, Mood & affect appropriate      	    ECG:  	  RADIOLOGY:   DIAGNOSTIC TESTING:  [ ] Echocardiogram:  [ ]  Catheterization:  [ ] Stress Test:    [ ] MARSHALL  OTHER: 	    LABS:	 	  CARDIAC MARKERS:                                  9.4    9.13  )-----------( 179      ( 06 May 2021 07:03 )             31.8     05-06    140  |  102  |  47<H>  ----------------------------<  162<H>  4.3   |  26  |  3.02<H>    Ca    8.7      06 May 2021 07:03  Phos  4.1     05-06  Mg     1.8     05-06    TPro  7.8  /  Alb  4.0  /  TBili  0.2  /  DBili  x   /  AST  71<H>  /  ALT  50<H>  /  AlkPhos  94  05-05    proBNP: Serum Pro-Brain Natriuretic Peptide: 3570 pg/mL (05-05 @ 11:31)    Lipid Profile:   HgA1c:   TSH:   PT/INR - ( 06 May 2021 07:03 )   PT: 14.2 sec;   INR: 1.19          PTT - ( 06 May 2021 07:03 )  PTT:53.2 sec    ASSESSMENT/PLAN: 	        DVT ppx:  Dispo:     Interventional Cardiology PA Adult Progress Note    CC: HTN Emergency, NSTEMI, Acute on Chronic Systolic CHF     Subjective Assessment: Patient seen and examined at bedside. Patient reports feeling well and denies C/P, SOB, palpitations, dizziness, diaphoresis, N/V, dysuria, melena, BRBPR. He reports productive cough with white sputum.     ROS otherwise negative unless otherwise stated except per HPI and Subjective   	  MEDICATIONS:  amLODIPine   Tablet 10 milliGRAM(s) Oral daily  carvedilol 25 milliGRAM(s) Oral every 12 hours  furosemide   Injectable 40 milliGRAM(s) IV Push every 12 hours  hydrALAZINE 100 milliGRAM(s) Oral every 8 hours  tamsulosin 0.4 milliGRAM(s) Oral at bedtime  azithromycin   Tablet 250 milliGRAM(s) Oral daily  cefTRIAXone Injectable. 1000 milliGRAM(s) IV Push every 24 hours  budesonide  80 MICROgram(s)/formoterol 4.5 MICROgram(s) Inhaler 2 Puff(s) Inhalation two times a day  allopurinol 100 milliGRAM(s) Oral two times a day  atorvastatin 80 milliGRAM(s) Oral at bedtime  dextrose 40% Gel 15 Gram(s) Oral once  dextrose 50% Injectable 25 Gram(s) IV Push once  dextrose 50% Injectable 12.5 Gram(s) IV Push once  dextrose 50% Injectable 25 Gram(s) IV Push once  glucagon  Injectable 1 milliGRAM(s) IntraMuscular once  insulin lispro (ADMELOG) corrective regimen sliding scale   SubCutaneous Before meals and at bedtime  aspirin  chewable 81 milliGRAM(s) Oral daily  clopidogrel Tablet 75 milliGRAM(s) Oral daily  dextrose 5%. 1000 milliLiter(s) IV Continuous <Continuous>  dextrose 5%. 1000 milliLiter(s) IV Continuous <Continuous>  heparin   Injectable 7000 Unit(s) IV Push every 6 hours PRN  heparin   Injectable 3500 Unit(s) IV Push every 6 hours PRN  heparin  Infusion.  Unit(s)/Hr IV Continuous <Continuous>  magnesium oxide 800 milliGRAM(s) Oral once    [PHYSICAL EXAM:  TELEMETRY:  T(C): 36.2 (05-06-21 @ 10:11), Max: 36.8 (05-06-21 @ 03:00)  HR: 77 (05-06-21 @ 08:42) (70 - 131)  BP: 138/65 (05-06-21 @ 08:33) (137/83 - 235/131)  RR: 19 (05-06-21 @ 08:42) (17 - 26)  SpO2: 96% (05-06-21 @ 08:42) (93% - 100%)  Wt(kg): --  I&O's Summary    05 May 2021 07:01  -  06 May 2021 07:00  --------------------------------------------------------  IN: 168 mL / OUT: 2950 mL / NET: -2782 mL    Height (cm): 172.7 (05-06 @ 03:43)  Weight (kg): 90 (05-05 @ 11:13)  BMI (kg/m2): 30.2 (05-06 @ 03:43)  BSA (m2): 2.04 (05-06 @ 03:43)  Ross: No  Central/PICC/Mid Line: No                                         Appearance: Normal	  HEENT:   Normal oral mucosa, PERRL, EOMI	  Neck: Supple. No JVD.   Cardiovascular: + S1 S2. RRR. No murmurs.   Respiratory: Bibasilar rales. Scattered rhonchi. No wheezes.   Gastrointestinal:  BS x 4. Soft NT/ND  Skin: No rashes, No ecchymoses, No cyanosis  Extremities: Normal range of motion, No clubbing, cyanosis or edema BLE. No calf tenderness BLE.   Vascular: Peripheral pulses palpable 2+ bilaterally  Neurologic: Non-focal  Psychiatry: A & O x 3, Mood & affect appropriate  	    Echocardiogram 5/5/2021:   1. Limited study obtained for evaluation of left ventricular function.   2. Left ventricular systolic function is moderately-to-severely reduced with an ejection fraction of    25-30%. With the exception of the the basal and mid anteroseptum, the remaining segments are moderate to severely hypokinetic.   3. Normal right ventricular size.   4. The right ventricular systolic function is probably reduced.   5. Borderline dilated left atrium.   6. No pericardial effusion.   7. The aortic root is mildly dilated measuring 4.00 cm.   8. No prior echo is available for comparison.    LABS:	 	  CARDIAC MARKERS:                     9.4    9.13  )-----------( 179      ( 06 May 2021 07:03 )             31.8     05-06    140  |  102  |  47<H>  ----------------------------<  162<H>  4.3   |  26  |  3.02<H>    Ca    8.7      06 May 2021 07:03  Phos  4.1     05-06  Mg     1.8     05-06    TPro  7.8  /  Alb  4.0  /  TBili  0.2  /  DBili  x   /  AST  71<H>  /  ALT  50<H>  /  AlkPhos  94  05-05    proBNP: Serum Pro-Brain Natriuretic Peptide: 3570 pg/mL (05-05 @ 11:31)  PT/INR - ( 06 May 2021 07:03 )   PT: 14.2 sec;   INR: 1.19     PTT - ( 06 May 2021 07:03 )  PTT:53.2 sec

## 2021-05-06 NOTE — PROGRESS NOTE ADULT - ATTENDING COMMENTS
Pt is 69yo non-compliant (reports forgetting to take meds) M w/ PMHx of HLD, DM T2, uncontrolled HTN, CAD (questionable Hx of stents? pt denies but outpatient may have more info??), PAD (pt reporting stents in L lower extremity), HFrEF (EF 23% in 2017), COPD (pt w/ home O2 via NC PRN), CKD (unknown Cr baseline) who presented to [facility] ED on 5/5/21 in acute respiratory distress suspected in the setting of HTN emergency. Respiratory distress improved w/ Lasix 40 IV x1 and BiPAP, now on High Flow nasal cannula and diuresing w/ Lasix 40 IV BID. TTE showing LVEF 25-30%. HTN improved w/ Nitro gtt and Coreg/Hydralazine (SBP goal 160 at this time).  Initial EKG showed new onset Afib. Pt ruled in NSTEMI w/ Trop 0.09 --> 0.14 w/ development of TWI in inferolateral leads. Patient treated with ASA/Plavix/Heparin with peak Troponin 0.16 awaiting cardiac cath once euvolemic. Initial attending contact date 5/6/21     . See PA note written above for details. I reviewed the PA documentation. I have personally seen and examined this patient. I reviewed vitals, labs, medications, cardiac studies, and additional imaging. I agree with the above PA's findings and plans as written above with the following additions/statements.    -Pt is 71yo non-compliant (reports forgetting to take meds) M w/ PMHx of HLD, DM T2, uncontrolled HTN, CAD (questionable Hx of stents? pt denies but outpatient may have more info??), PAD (pt reporting stents in L lower extremity), HFrEF (EF 23% in 2017), COPD (pt w/ home O2 via NC PRN), CKD (unknown Cr baseline) admitted with HTN emergency ( due to med non compliance) in setting of acute on chronic systolic HF/NSTEMI/ new onset A fib  -HTN emergency: Nitro drip tapered off, BP within acceptable range 150-160/70. Cont hydralazine 100 mg po tid, coreg 25 gm po bid, amd amlodipine 10 mg po qd   -NSTEMI: with chest tightness in setting of HTN emergency with ischemic T wave inversion v4-v6. Pk trop .16 now trending down. Cont ASA, plavix, IV heparin (to end tomorrow evening). Cont coreg, atorva 80. Plan for cath likely monday once euvolemic  -CRI: monitor Crt with diuresis  -New onset A fib : now in NSR. Cont IV heparin  -Acute on chronic systolic HF: SOB improved. - 3 L since admission, cont lasix 40 mg IV bid. Core heart failure measures. Condom cath for Is and Os  ECHO EF 25-30 , mod-severe hypokinesis all wells except anteroseptum   -PT to eval

## 2021-05-07 DIAGNOSIS — R65.10 SYSTEMIC INFLAMMATORY RESPONSE SYNDROME (SIRS) OF NON-INFECTIOUS ORIGIN WITHOUT ACUTE ORGAN DYSFUNCTION: ICD-10-CM

## 2021-05-07 DIAGNOSIS — J44.9 CHRONIC OBSTRUCTIVE PULMONARY DISEASE, UNSPECIFIED: ICD-10-CM

## 2021-05-07 LAB
24R-OH-CALCIDIOL SERPL-MCNC: 30.8 NG/ML — SIGNIFICANT CHANGE UP (ref 30–80)
ANION GAP SERPL CALC-SCNC: 10 MMOL/L — SIGNIFICANT CHANGE UP (ref 5–17)
APTT BLD: 98.3 SEC — HIGH (ref 27.5–35.5)
BASOPHILS # BLD AUTO: 0.04 K/UL — SIGNIFICANT CHANGE UP (ref 0–0.2)
BASOPHILS NFR BLD AUTO: 0.3 % — SIGNIFICANT CHANGE UP (ref 0–2)
BLD GP AB SCN SERPL QL: NEGATIVE — SIGNIFICANT CHANGE UP
BUN SERPL-MCNC: 59 MG/DL — HIGH (ref 7–23)
CALCIUM SERPL-MCNC: 8.9 MG/DL — SIGNIFICANT CHANGE UP (ref 8.4–10.5)
CHLORIDE SERPL-SCNC: 101 MMOL/L — SIGNIFICANT CHANGE UP (ref 96–108)
CO2 SERPL-SCNC: 30 MMOL/L — SIGNIFICANT CHANGE UP (ref 22–31)
CREAT SERPL-MCNC: 3.3 MG/DL — HIGH (ref 0.5–1.3)
EOSINOPHIL # BLD AUTO: 0.12 K/UL — SIGNIFICANT CHANGE UP (ref 0–0.5)
EOSINOPHIL NFR BLD AUTO: 0.9 % — SIGNIFICANT CHANGE UP (ref 0–6)
FERRITIN SERPL-MCNC: 88 NG/ML — SIGNIFICANT CHANGE UP (ref 30–400)
FOLATE SERPL-MCNC: >20 NG/ML — SIGNIFICANT CHANGE UP
GLUCOSE SERPL-MCNC: 104 MG/DL — HIGH (ref 70–99)
HCT VFR BLD CALC: 34.5 % — LOW (ref 39–50)
HGB BLD-MCNC: 10.3 G/DL — LOW (ref 13–17)
IMM GRANULOCYTES NFR BLD AUTO: 0.4 % — SIGNIFICANT CHANGE UP (ref 0–1.5)
IRON SATN MFR SERPL: 39 % — SIGNIFICANT CHANGE UP (ref 16–55)
IRON SATN MFR SERPL: 96 UG/DL — SIGNIFICANT CHANGE UP (ref 45–165)
LYMPHOCYTES # BLD AUTO: 15.6 % — SIGNIFICANT CHANGE UP (ref 13–44)
LYMPHOCYTES # BLD AUTO: 2 K/UL — SIGNIFICANT CHANGE UP (ref 1–3.3)
MAGNESIUM SERPL-MCNC: 2 MG/DL — SIGNIFICANT CHANGE UP (ref 1.6–2.6)
MCHC RBC-ENTMCNC: 27.7 PG — SIGNIFICANT CHANGE UP (ref 27–34)
MCHC RBC-ENTMCNC: 29.9 GM/DL — LOW (ref 32–36)
MCV RBC AUTO: 92.7 FL — SIGNIFICANT CHANGE UP (ref 80–100)
MONOCYTES # BLD AUTO: 0.75 K/UL — SIGNIFICANT CHANGE UP (ref 0–0.9)
MONOCYTES NFR BLD AUTO: 5.8 % — SIGNIFICANT CHANGE UP (ref 2–14)
NEUTROPHILS # BLD AUTO: 9.88 K/UL — HIGH (ref 1.8–7.4)
NEUTROPHILS NFR BLD AUTO: 77 % — SIGNIFICANT CHANGE UP (ref 43–77)
NRBC # BLD: 0 /100 WBCS — SIGNIFICANT CHANGE UP (ref 0–0)
PHOSPHATE SERPL-MCNC: 3.9 MG/DL — SIGNIFICANT CHANGE UP (ref 2.5–4.5)
PLATELET # BLD AUTO: 205 K/UL — SIGNIFICANT CHANGE UP (ref 150–400)
POTASSIUM SERPL-MCNC: 4.4 MMOL/L — SIGNIFICANT CHANGE UP (ref 3.5–5.3)
POTASSIUM SERPL-SCNC: 4.4 MMOL/L — SIGNIFICANT CHANGE UP (ref 3.5–5.3)
RBC # BLD: 3.72 M/UL — LOW (ref 4.2–5.8)
RBC # BLD: 3.72 M/UL — LOW (ref 4.2–5.8)
RBC # FLD: 13.7 % — SIGNIFICANT CHANGE UP (ref 10.3–14.5)
RETICS #: 100.1 K/UL — SIGNIFICANT CHANGE UP (ref 25–125)
RETICS/RBC NFR: 2.7 % — HIGH (ref 0.5–2.5)
RH IG SCN BLD-IMP: POSITIVE — SIGNIFICANT CHANGE UP
SODIUM SERPL-SCNC: 141 MMOL/L — SIGNIFICANT CHANGE UP (ref 135–145)
TIBC SERPL-MCNC: 245 UG/DL — SIGNIFICANT CHANGE UP (ref 220–430)
UIBC SERPL-MCNC: 149 UG/DL — SIGNIFICANT CHANGE UP (ref 110–370)
VIT B12 SERPL-MCNC: 914 PG/ML — SIGNIFICANT CHANGE UP (ref 232–1245)
VIT D25+D1,25 OH+D1,25 PNL SERPL-MCNC: 26.3 PG/ML — SIGNIFICANT CHANGE UP (ref 19.9–79.3)
WBC # BLD: 12.84 K/UL — HIGH (ref 3.8–10.5)
WBC # FLD AUTO: 12.84 K/UL — HIGH (ref 3.8–10.5)

## 2021-05-07 PROCEDURE — 99233 SBSQ HOSP IP/OBS HIGH 50: CPT

## 2021-05-07 PROCEDURE — 99232 SBSQ HOSP IP/OBS MODERATE 35: CPT

## 2021-05-07 RX ORDER — FUROSEMIDE 40 MG
40 TABLET ORAL ONCE
Refills: 0 | Status: COMPLETED | OUTPATIENT
Start: 2021-05-08 | End: 2021-05-08

## 2021-05-07 RX ADMIN — CLOPIDOGREL BISULFATE 75 MILLIGRAM(S): 75 TABLET, FILM COATED ORAL at 11:58

## 2021-05-07 RX ADMIN — CARVEDILOL PHOSPHATE 25 MILLIGRAM(S): 80 CAPSULE, EXTENDED RELEASE ORAL at 18:57

## 2021-05-07 RX ADMIN — HEPARIN SODIUM 1600 UNIT(S)/HR: 5000 INJECTION INTRAVENOUS; SUBCUTANEOUS at 07:47

## 2021-05-07 RX ADMIN — Medication 2: at 11:58

## 2021-05-07 RX ADMIN — Medication 100 MILLIGRAM(S): at 18:57

## 2021-05-07 RX ADMIN — Medication 100 MILLIGRAM(S): at 06:14

## 2021-05-07 RX ADMIN — Medication 100 MILLIGRAM(S): at 13:34

## 2021-05-07 RX ADMIN — ATORVASTATIN CALCIUM 80 MILLIGRAM(S): 80 TABLET, FILM COATED ORAL at 21:53

## 2021-05-07 RX ADMIN — Medication 40 MILLIGRAM(S): at 06:14

## 2021-05-07 RX ADMIN — Medication 81 MILLIGRAM(S): at 11:57

## 2021-05-07 RX ADMIN — Medication 100 MILLIGRAM(S): at 21:53

## 2021-05-07 RX ADMIN — TAMSULOSIN HYDROCHLORIDE 0.4 MILLIGRAM(S): 0.4 CAPSULE ORAL at 21:53

## 2021-05-07 RX ADMIN — CARVEDILOL PHOSPHATE 25 MILLIGRAM(S): 80 CAPSULE, EXTENDED RELEASE ORAL at 06:14

## 2021-05-07 RX ADMIN — AZITHROMYCIN 250 MILLIGRAM(S): 500 TABLET, FILM COATED ORAL at 11:58

## 2021-05-07 RX ADMIN — BUDESONIDE AND FORMOTEROL FUMARATE DIHYDRATE 2 PUFF(S): 160; 4.5 AEROSOL RESPIRATORY (INHALATION) at 10:17

## 2021-05-07 RX ADMIN — BUDESONIDE AND FORMOTEROL FUMARATE DIHYDRATE 2 PUFF(S): 160; 4.5 AEROSOL RESPIRATORY (INHALATION) at 21:53

## 2021-05-07 RX ADMIN — AMLODIPINE BESYLATE 10 MILLIGRAM(S): 2.5 TABLET ORAL at 06:14

## 2021-05-07 NOTE — PROGRESS NOTE ADULT - ATTENDING COMMENTS
69 yo man with CKD 4, HTN- BP controlled.  creat stable in 3's  approx 3.1 L negative as targeted  for cardiac cath 3/10- can hold diuretics prior (and add fluids if miss fabio on target negative balance      HTN  Anemia  CKD-MBD

## 2021-05-07 NOTE — PROGRESS NOTE ADULT - PROBLEM SELECTOR PLAN 3
History of systolic CHF EF 23% by echo 2017.  -- TTE (5/5/21): limited study; LVEF 25-30%, mod-severe hypokinesis in all segments other than basal and mid anteroseptum segments.   -- HOME: Torsemide 20mg PO QD. Continue  Lasix 40 mg IV BID diuresing well net negative 5 L . Ross originally placed in ER now discontinued (passed TOV)  with condom catheter placed.   -Is/Os, Daily Weights. 1 L Fluid restriction. Core Measures.

## 2021-05-07 NOTE — PROGRESS NOTE ADULT - PROBLEM SELECTOR PLAN 5
Leukocytosis on admission WBC 17 with left shift which initially resolved but WBC 12 5/8/21 . Patient currently afebrile.   -Patient reports productive cough with white sputum which has been presented since admission  -CXR 5/5/2021: Bilateral opacities/right pleural effusion.  -- ABX: Ceftriaxone 1000mg IV q 24hrs x 7 days and Azithromycin 500mg PO Daily x 1 followed by Azithromycin 250mg PO Daily x 4 days.   -- Continue to monitor; please send Cx again if pt febrile.

## 2021-05-07 NOTE — PROGRESS NOTE ADULT - PROBLEM SELECTOR PLAN 2
-- Pt reports prior to presentation he had substernal chest pain. Patient currently C/P free and HD stable   -- Denying Hx of cardiac stents, but outpatient provider mentioned he may have had stents/MI in past.   -- EKG developed TWI in inferolateral leads. Peak Troponin 0.16 trended down to 0.14.   -- s/p ASA 325mg PO x1 and Plavix 600mg PO x1 load.   -- CONT: Heparin gtt, ASA 81mg PO Daily,  Plavix 75mg PO Daily, Coreg 25mg PO BID, Atorvastatin 80mg PO QHS.  - Plan for cardiac cath 5/7/21 once pt. euvolemic,

## 2021-05-07 NOTE — PROGRESS NOTE ADULT - ATTENDING COMMENTS
See PA note written above, for details. I reviewed the PA documentation.  I have personally seen and examined this patient today. I reviewed vitals, labs, medications, cardiac studies and additional imaging.  I agree with the PA's findings and plans as written above with the following additions/amendments:  70M with HLD, DM T2, uncontrolled HTN, CAD, PAD, Chronic HFrEF (EF 23% in 2017), COPD (on home O2), CKD IV admitted with HTN emergency, acute on chronic systolic, NSTEMI and new onset A fib. Patient titrated off nitro gtt now on oral agents. Ischemic evaluation pending. paroxysmal afib now in NRR, approaching euvolemia on IV diuretics. Patient with no EVGENY, trace RLL wet crackles  Plan for:   Lasix down titrated to 40mg IV daily in consult with nephrology  Will dose additional lasix 40mg iv daily on SAT  No diuretics on SUNDAY in anticipation for contrast load monday  LHC for coronary evaluation planned MOnday 5/10  Cont IV heparin while awaiting LHC  Transition to NOAC post cath for AFib CVA risk reduction  DAPT + Atorva 80  Hydralazine 100 TID, Coreg 25 BID, Amlodipine 10 daily  Azithro/CTX x5D for CAP treatment, check PCL  Awaiting PT evaluation  Case discussed with patient and cardiac care team  Twila Bass M.D.  Cardiology Attending

## 2021-05-07 NOTE — PROGRESS NOTE ADULT - PROBLEM SELECTOR PLAN 6
Presumed to be new onset. No history of Afib and not currently on AC at home  -Currently in NSR.  Continue to monitor HR  - hep gtt

## 2021-05-07 NOTE — PROGRESS NOTE ADULT - SUBJECTIVE AND OBJECTIVE BOX
renally stable with good diuresis  net negative 3.1L/ 24hr  stable home oxygen requir   plan for cardiac cath on Monday, 5/10      Meds:  allopurinol 100 two times a day  amLODIPine   Tablet 10 daily  aspirin  chewable 81 daily  atorvastatin 80 at bedtime  azithromycin   Tablet 250 daily  budesonide  80 MICROgram(s)/formoterol 4.5 MICROgram(s) Inhaler 2 two times a day  carvedilol 25 every 12 hours  cefTRIAXone Injectable. 1000 every 24 hours  clopidogrel Tablet 75 daily  dextrose 40% Gel 15 once  dextrose 5%. 1000 <Continuous>  dextrose 5%. 1000 <Continuous>  dextrose 50% Injectable 25 once  dextrose 50% Injectable 12.5 once  dextrose 50% Injectable 25 once  glucagon  Injectable 1 once  heparin   Injectable 7000 every 6 hours PRN  heparin   Injectable 3500 every 6 hours PRN  heparin  Infusion.  <Continuous>  hydrALAZINE 100 every 8 hours  insulin lispro (ADMELOG) corrective regimen sliding scale  Before meals and at bedtime  tamsulosin 0.4 at bedtime      T(C): , Max: 36.8 (21 @ 18:16)  T(F): , Max: 98.2 (21 @ 18:16)  HR: 66 (21 @ 09:15)  BP: 135/67 (21 @ 08:44)  BP(mean): --  RR: 18 (21 @ 09:15)  SpO2: 99% (21 @ 09:15)  Wt(kg): --     07:01  -   @ 07:00  --------------------------------------------------------  IN: 420 mL / OUT: 3600 mL / NET: -3180 mL     @ 07:01  -   @ 11:15  --------------------------------------------------------  IN: 240 mL / OUT: 600 mL / NET: -360 mL      REVIEW OF SYSTEMS:  Gen: No fever  CVS: No chest pain  Resp: No shortness of breath  Abd: No nausea/ No vomiting/No abdominal pain  CNS: No headache      PHYSICAL EXAM:  GENERAL: alert, no acute distress at present, on RA   NECK: supple, No JVD  CHEST/LUNG: decreased breath sounds bilaterally  HEART: normal S1S2, RRR  ABDOMEN: Soft, Nontender, +BS, No flank tenderness  EXTREMITIES: No LE edema bilateral  Neurology: AAOx3, no focal neurological deficit  SKIN: No rashes        LABS:                        10.3   12.84 )-----------( 205      ( 07 May 2021 06:49 )             34.5     05-07    141  |  101  |  59<H>  ----------------------------<  104<H>  4.4   |  30  |  3.30<H>    Ca    8.9      07 May 2021 06:49  Phos  3.9     05-07  Mg     2.0     05-07    TPro  7.8  /  Alb  4.0  /  TBili  0.2  /  DBili  x   /  AST  71<H>  /  ALT  50<H>  /  AlkPhos  94  05-05      PT/INR - ( 06 May 2021 07:03 )   PT: 14.2 sec;   INR: 1.19          PTT - ( 07 May 2021 06:49 )  PTT:98.3 sec  Urinalysis Basic - ( 05 May 2021 12:03 )    Color: Yellow / Appearance: Clear / S.025 / pH: x  Gluc: x / Ketone: NEGATIVE  / Bili: Negative / Urobili: 0.2 E.U./dL   Blood: x / Protein: >=300 mg/dL / Nitrite: NEGATIVE   Leuk Esterase: NEGATIVE / RBC: < 5 /HPF / WBC < 5 /HPF   Sq Epi: x / Non Sq Epi: 0-5 /HPF / Bacteria: Present /HPF            RADIOLOGY & ADDITIONAL STUDIES:

## 2021-05-07 NOTE — CONSULT NOTE ADULT - SUBJECTIVE AND OBJECTIVE BOX
PULMONARY SERVICE INITIAL CONSULT NOTE  -------------------------------------------------------------    HPI:     Pt is 69yo M w/ PMHx of HLD, DM T2, uncontrolled HTN, CAD (questionable Hx of stents? pt denies but outpatient may have more info??), PAD (pt reporting stents in L lower extremity), HFrEF (EF 23% in 2017), COPD (pt w/ home O2 via NC PRN), CKD (unknown Cr baseline) who presented to [facility] ED on 5/5/21 in acute respiratory distress, HTN emergency. Pt reporting some non-compliance w/ medications recently. States he has felt "off" over the past week and today he experienced chest pain, dizziness, and acute onset of shortness of breath. Pt denies palpitations, syncope, abdominal pain/discomfort, PND, LE edema, fever/chills, URI symptoms.     In the ED, vitals: /131, , O2 97% on BiPAP, RR 24, T 96.8 F. In the ED, labs: WBC 17.94, Hgb/Hct 10.1/35.0, Plt Cnt 229; Na 139, K 4.5; BUN/Cr 41/3.08; Trop T 0.09; BNP 3570; Lactate 0.7; D-Dimer 600; CRP 7.2; Procalcitonin 0.18; . EKG sinus rhythm w/ TWI in inferolateral leads. CXR w/ B/l opacities/R pleural effusion. Bedside US showing B-lines and severe global wall motion abnormalities, but not pericardial effusion. COVID-19 negative.     INTERVENTIONS: pt given Lasix 40mg IV x1, SoluMedrol 125mg IV x1, and initiated on Nitro gtt @ 50mcg/min. Pt also placed on BiPAP, and then weaned down to high flow NC.    Pt admitted to cardiology for further management.     TTE showing LVEF 25-30%.  Initial EKG showed new onset Afib. Pt ruled in NSTEMI w/ Trop 0.09 --> 0.14 w/ development of TWI in inferolateral leads. Patient treated with ASA/Plavix/Heparin with peak Troponin 0.16 with plan for cardiac cath 5/10/21  once euvolemic.    -----------    Pt was placed on Ceftriaxone and Azithromycin for suspected CAP given opacities on imaging, cough, and leukocytosis. His WBCs had been trending down, until bump back up to 12 today. This prompted concern for lack of sustained improvement secondary to a possible pulmonary source.     He has been afebrile with stable vitals.  Pt seen and examined this afternoon with family at bedside.  On RA with saturation in high-90s.   His mildly productive cough has been chronic for several months, with no acute worsening. Denies SOB, fevers, chest pain. Overall, feels well.   Follows with Pulmonologist outpatient and was seen a few weeks ago. Reports being on Combivent and Albuterol.        -------------------------------------------------------------  PAST MEDICAL & SURGICAL HISTORY:      FAMILY HISTORY:      SOCIAL HISTORY:  Smoking Status: [ ] Current, [ ] Former, [ ] Never  Pack Years:    MEDICATIONS:  Pulmonary:  budesonide  80 MICROgram(s)/formoterol 4.5 MICROgram(s) Inhaler 2 Puff(s) Inhalation two times a day    Antimicrobials:  azithromycin   Tablet 250 milliGRAM(s) Oral daily  cefTRIAXone Injectable. 1000 milliGRAM(s) IV Push every 24 hours    Anticoagulants:  aspirin  chewable 81 milliGRAM(s) Oral daily  clopidogrel Tablet 75 milliGRAM(s) Oral daily  heparin   Injectable 7000 Unit(s) IV Push every 6 hours PRN  heparin   Injectable 3500 Unit(s) IV Push every 6 hours PRN  heparin  Infusion.  Unit(s)/Hr IV Continuous <Continuous>    Onc:    GI/:    Endocrine:  allopurinol 100 milliGRAM(s) Oral two times a day  atorvastatin 80 milliGRAM(s) Oral at bedtime  dextrose 40% Gel 15 Gram(s) Oral once  dextrose 50% Injectable 25 Gram(s) IV Push once  dextrose 50% Injectable 12.5 Gram(s) IV Push once  dextrose 50% Injectable 25 Gram(s) IV Push once  glucagon  Injectable 1 milliGRAM(s) IntraMuscular once  insulin lispro (ADMELOG) corrective regimen sliding scale   SubCutaneous Before meals and at bedtime    Cardiac:  amLODIPine   Tablet 10 milliGRAM(s) Oral daily  carvedilol 25 milliGRAM(s) Oral every 12 hours  hydrALAZINE 100 milliGRAM(s) Oral every 8 hours  tamsulosin 0.4 milliGRAM(s) Oral at bedtime    Other Medications:  dextrose 5%. 1000 milliLiter(s) IV Continuous <Continuous>  dextrose 5%. 1000 milliLiter(s) IV Continuous <Continuous>      Allergies    lisinopril (Anaphylaxis)    Intolerances        Vital Signs Last 24 Hrs  T(C): 36.8 (07 May 2021 13:36), Max: 36.8 (07 May 2021 13:36)  T(F): 98.3 (07 May 2021 13:36), Max: 98.3 (07 May 2021 13:36)  HR: 73 (07 May 2021 15:45) (66 - 80)  BP: 155/72 (07 May 2021 13:33) (135/67 - 171/80)  BP(mean): --  RR: 18 (07 May 2021 15:45) (14 - 19)  SpO2: 97% (07 May 2021 15:45) (95% - 99%)    05-06 @ 07:01  -  05-07 @ 07:00  --------------------------------------------------------  IN: 420 mL / OUT: 3600 mL / NET: -3180 mL    05-07 @ 07:01 - 05-07 @ 18:26  --------------------------------------------------------  IN: 480 mL / OUT: 600 mL / NET: -120 mL          -------------------------------------------------------------  PHYSICAL EXAM:    GEN: Comfortable, in NAD  HEENT: NC/AT, PEERLA, MMM  CARDIAC: RRR, Normal S1/S2, No MRGs  PULMONARY: Crackles BL; no wheezing/rhonchi - no accessory muscle use   ABDOMEN: Soft, NT/ND   EXT: 2+ pitting LE edema BL   NEURO: A&O x 3, CN II-XII grossly intact, moves all ext, sensation intact    -------------------------------------------------------------  LABS:        CBC Full  -  ( 07 May 2021 06:49 )  WBC Count : 12.84 K/uL  RBC Count : 3.72 M/uL  Hemoglobin : 10.3 g/dL  Hematocrit : 34.5 %  Platelet Count - Automated : 205 K/uL  Mean Cell Volume : 92.7 fl  Mean Cell Hemoglobin : 27.7 pg  Mean Cell Hemoglobin Concentration : 29.9 gm/dL  Auto Neutrophil # : 9.88 K/uL  Auto Lymphocyte # : 2.00 K/uL  Auto Monocyte # : 0.75 K/uL  Auto Eosinophil # : 0.12 K/uL  Auto Basophil # : 0.04 K/uL  Auto Neutrophil % : 77.0 %  Auto Lymphocyte % : 15.6 %  Auto Monocyte % : 5.8 %  Auto Eosinophil % : 0.9 %  Auto Basophil % : 0.3 %    05-07    141  |  101  |  59<H>  ----------------------------<  104<H>  4.4   |  30  |  3.30<H>    Ca    8.9      07 May 2021 06:49  Phos  3.9     05-07  Mg     2.0     05-07      PT/INR - ( 06 May 2021 07:03 )   PT: 14.2 sec;   INR: 1.19          PTT - ( 07 May 2021 06:49 )  PTT:98.3 sec                  -------------------------------------------------------------  RADIOLOGY & ADDITIONAL STUDIES:   PULMONARY SERVICE INITIAL CONSULT NOTE  -------------------------------------------------------------    HPI:     Pt is 69yo M w/ PMHx of HLD, DM T2, uncontrolled HTN, CAD (questionable Hx of stents? pt denies but outpatient may have more info??), PAD (pt reporting stents in L lower extremity), HFrEF (EF 23% in 2017), COPD (pt w/ home O2 via NC PRN), CKD (unknown Cr baseline) who presented to [facility] ED on 5/5/21 in acute respiratory distress, HTN emergency. Pt reporting some non-compliance w/ medications recently. States he has felt "off" over the past week and today he experienced chest pain, dizziness, and acute onset of shortness of breath. Pt denies palpitations, syncope, abdominal pain/discomfort, PND, LE edema, fever/chills, URI symptoms.     In the ED, vitals: /131, , O2 97% on BiPAP, RR 24, T 96.8 F. In the ED, labs: WBC 17.94, Hgb/Hct 10.1/35.0, Plt Cnt 229; Na 139, K 4.5; BUN/Cr 41/3.08; Trop T 0.09; BNP 3570; Lactate 0.7; D-Dimer 600; CRP 7.2; Procalcitonin 0.18; . EKG sinus rhythm w/ TWI in inferolateral leads. CXR w/ B/l opacities/R pleural effusion. Bedside US showing B-lines and severe global wall motion abnormalities, but not pericardial effusion. COVID-19 negative.     INTERVENTIONS: pt given Lasix 40mg IV x1, SoluMedrol 125mg IV x1, and initiated on Nitro gtt @ 50mcg/min. Pt also placed on BiPAP, and then weaned down to high flow NC.    Pt admitted to cardiology for further management.     TTE showing LVEF 25-30%.  Initial EKG showed new onset Afib. Pt ruled in NSTEMI w/ Trop 0.09 --> 0.14 w/ development of TWI in inferolateral leads. Patient treated with ASA/Plavix/Heparin with peak Troponin 0.16 with plan for cardiac cath 5/10/21  once euvolemic.    -----------    Pt was placed on Ceftriaxone and Azithromycin for suspected CAP given opacities on imaging, cough, and leukocytosis. His WBCs had been trending down, until bump back up to 12 today. This prompted concern for lack of sustained improvement secondary to a possible pulmonary source.     He has been afebrile with stable vitals.  Pt seen and examined this afternoon with family at bedside.  On RA with saturation in high-90s.   His mildly productive cough has been chronic for several months, with no acute worsening. Denies SOB, fevers, chest pain. Overall, feels well.   Follows with Pulmonologist outpatient and was seen a few weeks ago. Reports being on Dulera and Albuterol.        -------------------------------------------------------------  PAST MEDICAL & SURGICAL HISTORY:      FAMILY HISTORY:      SOCIAL HISTORY:  Smoking Status: [ ] Current, [ ] Former, [ ] Never  Pack Years:    MEDICATIONS:  Pulmonary:  budesonide  80 MICROgram(s)/formoterol 4.5 MICROgram(s) Inhaler 2 Puff(s) Inhalation two times a day    Antimicrobials:  azithromycin   Tablet 250 milliGRAM(s) Oral daily  cefTRIAXone Injectable. 1000 milliGRAM(s) IV Push every 24 hours    Anticoagulants:  aspirin  chewable 81 milliGRAM(s) Oral daily  clopidogrel Tablet 75 milliGRAM(s) Oral daily  heparin   Injectable 7000 Unit(s) IV Push every 6 hours PRN  heparin   Injectable 3500 Unit(s) IV Push every 6 hours PRN  heparin  Infusion.  Unit(s)/Hr IV Continuous <Continuous>    Onc:    GI/:    Endocrine:  allopurinol 100 milliGRAM(s) Oral two times a day  atorvastatin 80 milliGRAM(s) Oral at bedtime  dextrose 40% Gel 15 Gram(s) Oral once  dextrose 50% Injectable 25 Gram(s) IV Push once  dextrose 50% Injectable 12.5 Gram(s) IV Push once  dextrose 50% Injectable 25 Gram(s) IV Push once  glucagon  Injectable 1 milliGRAM(s) IntraMuscular once  insulin lispro (ADMELOG) corrective regimen sliding scale   SubCutaneous Before meals and at bedtime    Cardiac:  amLODIPine   Tablet 10 milliGRAM(s) Oral daily  carvedilol 25 milliGRAM(s) Oral every 12 hours  hydrALAZINE 100 milliGRAM(s) Oral every 8 hours  tamsulosin 0.4 milliGRAM(s) Oral at bedtime    Other Medications:  dextrose 5%. 1000 milliLiter(s) IV Continuous <Continuous>  dextrose 5%. 1000 milliLiter(s) IV Continuous <Continuous>      Allergies    lisinopril (Anaphylaxis)    Intolerances        Vital Signs Last 24 Hrs  T(C): 36.8 (07 May 2021 13:36), Max: 36.8 (07 May 2021 13:36)  T(F): 98.3 (07 May 2021 13:36), Max: 98.3 (07 May 2021 13:36)  HR: 73 (07 May 2021 15:45) (66 - 80)  BP: 155/72 (07 May 2021 13:33) (135/67 - 171/80)  BP(mean): --  RR: 18 (07 May 2021 15:45) (14 - 19)  SpO2: 97% (07 May 2021 15:45) (95% - 99%)    05-06 @ 07:01  -  05-07 @ 07:00  --------------------------------------------------------  IN: 420 mL / OUT: 3600 mL / NET: -3180 mL    05-07 @ 07:01  -  05-07 @ 18:26  --------------------------------------------------------  IN: 480 mL / OUT: 600 mL / NET: -120 mL          -------------------------------------------------------------  PHYSICAL EXAM:    GEN: Comfortable, in NAD  HEENT: NC/AT, PEERLA, MMM  CARDIAC: RRR, Normal S1/S2, No MRGs  PULMONARY: Crackles BL; no wheezing/rhonchi - no accessory muscle use   ABDOMEN: Soft, NT/ND   EXT: 2+ pitting LE edema BL   NEURO: A&O x 3, CN II-XII grossly intact, moves all ext, sensation intact    -------------------------------------------------------------  LABS:        CBC Full  -  ( 07 May 2021 06:49 )  WBC Count : 12.84 K/uL  RBC Count : 3.72 M/uL  Hemoglobin : 10.3 g/dL  Hematocrit : 34.5 %  Platelet Count - Automated : 205 K/uL  Mean Cell Volume : 92.7 fl  Mean Cell Hemoglobin : 27.7 pg  Mean Cell Hemoglobin Concentration : 29.9 gm/dL  Auto Neutrophil # : 9.88 K/uL  Auto Lymphocyte # : 2.00 K/uL  Auto Monocyte # : 0.75 K/uL  Auto Eosinophil # : 0.12 K/uL  Auto Basophil # : 0.04 K/uL  Auto Neutrophil % : 77.0 %  Auto Lymphocyte % : 15.6 %  Auto Monocyte % : 5.8 %  Auto Eosinophil % : 0.9 %  Auto Basophil % : 0.3 %    05-07    141  |  101  |  59<H>  ----------------------------<  104<H>  4.4   |  30  |  3.30<H>    Ca    8.9      07 May 2021 06:49  Phos  3.9     05-07  Mg     2.0     05-07      PT/INR - ( 06 May 2021 07:03 )   PT: 14.2 sec;   INR: 1.19          PTT - ( 07 May 2021 06:49 )  PTT:98.3 sec                  -------------------------------------------------------------  RADIOLOGY & ADDITIONAL STUDIES:

## 2021-05-07 NOTE — PROGRESS NOTE ADULT - PROBLEM SELECTOR PLAN 1
-- Presented w/ HTN emergency (likely secondary to non-compliance as patient reports forgetting to take meds) /131 w/ acute respiratory failure 2/2 flash pulmonary edema likely due to HTN.   - s/p Nitro gtt  -- CONT: home Coreg 25mg PO BID, Hydralazine 100mg PO TID. Amlodipine 10 mg daily added to regimen (on norvasc 5 mg at home).   -- PLAN: continue to monitor BP, and reintroduce home regimen as appropriate. Home BP regimen: Coreg 25mg PO BID (continue)- Hydralazine 100mg PO TID (continue)- Imdur 60mg PO QD (holding)- Amlodipine 5mg PO QD (increased to 10 mg daily )- Clonidine 0.1mg PO BID (holding),  Losartan 100mg PO QD (holding)

## 2021-05-07 NOTE — PROGRESS NOTE ADULT - PROBLEM SELECTOR PLAN 8
-- CONT: Tamsulosin 0.4mg PO QHS.         DVT ppx: heparin gtt  Dispo: c/w diuresis; plan for cath 5/10 once pt. euvolemic

## 2021-05-07 NOTE — CONSULT NOTE ADULT - TIME BILLING
Patient seen and examined. Agree with assessment and plan as documented above.   Leukocytosis likely reactive.   Chronic hypoxic respiratory failure 2/2 copd - currently not in exacerbation.   Needs follow up with his pulmonologist on dc.   Pulmonary ti sign of

## 2021-05-07 NOTE — PROGRESS NOTE ADULT - SUBJECTIVE AND OBJECTIVE BOX
Interventional Cardiology PA Adult Progress Note    CC: hypertensive emergency   Subjective Assessment: Pt. seen and examined at bedside today am eating breakfast. Pt. comfortable, denies any chest pain, SOB, dizziness, palpitations, N/V.    ROS neg except as per subjective HPI.   	  MEDICATIONS:  amLODIPine   Tablet 10 milliGRAM(s) Oral daily  carvedilol 25 milliGRAM(s) Oral every 12 hours  furosemide   Injectable 40 milliGRAM(s) IV Push every 12 hours  hydrALAZINE 100 milliGRAM(s) Oral every 8 hours  tamsulosin 0.4 milliGRAM(s) Oral at bedtime    azithromycin   Tablet 250 milliGRAM(s) Oral daily  cefTRIAXone Injectable. 1000 milliGRAM(s) IV Push every 24 hours    budesonide  80 MICROgram(s)/formoterol 4.5 MICROgram(s) Inhaler 2 Puff(s) Inhalation two times a day        allopurinol 100 milliGRAM(s) Oral two times a day  atorvastatin 80 milliGRAM(s) Oral at bedtime  dextrose 40% Gel 15 Gram(s) Oral once  dextrose 50% Injectable 25 Gram(s) IV Push once  dextrose 50% Injectable 12.5 Gram(s) IV Push once  dextrose 50% Injectable 25 Gram(s) IV Push once  glucagon  Injectable 1 milliGRAM(s) IntraMuscular once  insulin lispro (ADMELOG) corrective regimen sliding scale   SubCutaneous Before meals and at bedtime    aspirin  chewable 81 milliGRAM(s) Oral daily  clopidogrel Tablet 75 milliGRAM(s) Oral daily  dextrose 5%. 1000 milliLiter(s) IV Continuous <Continuous>  dextrose 5%. 1000 milliLiter(s) IV Continuous <Continuous>  heparin   Injectable 7000 Unit(s) IV Push every 6 hours PRN  heparin   Injectable 3500 Unit(s) IV Push every 6 hours PRN  heparin  Infusion.  Unit(s)/Hr IV Continuous <Continuous>      	    [PHYSICAL EXAM:  TELEMETRY:  T(C): 36.3 (05-07-21 @ 09:47), Max: 36.8 (05-06-21 @ 18:16)  HR: 74 (05-07-21 @ 08:44) (70 - 80)  BP: 135/67 (05-07-21 @ 08:44) (135/67 - 171/80)  RR: 18 (05-07-21 @ 08:44) (14 - 20)  SpO2: 95% (05-07-21 @ 08:44) (95% - 98%)  Wt(kg): --  I&O's Summary    06 May 2021 07:01  -  07 May 2021 07:00  --------------------------------------------------------  IN: 420 mL / OUT: 3600 mL / NET: -3180 mL    07 May 2021 07:01  -  07 May 2021 09:50  --------------------------------------------------------  IN: 0 mL / OUT: 600 mL / NET: -600 mL        Ross:  Central/PICC/Mid Line:                                           Appearance: Normal	  HEENT:   Normal oral mucosa, PERRL, EOMI	  Neck: Supple. No JVD.   Cardiovascular: + S1 S2. RRR. No murmurs.   Respiratory: Bibasilar rales.  No wheezes.   Gastrointestinal:  BS x 4. Soft NT/ND  Skin: No rashes, No ecchymoses, No cyanosis  Extremities: Normal range of motion, No clubbing, cyanosis or edema BLE. No calf tenderness BLE.   Vascular: Peripheral pulses palpable 2+ bilaterally  Neurologic: Non-focal  Psychiatry: A & O x 3, Mood & affect appropriate  	    ECG:  	  RADIOLOGY:   DIAGNOSTIC TESTING:  [ ] Echocardiogram:  [ ]  Catheterization:  [ ] Stress Test:    [ ] MARSHALL  OTHER: 	    LABS:	 	  CARDIAC MARKERS:                                  10.3   12.84 )-----------( 205      ( 07 May 2021 06:49 )             34.5     05-07    141  |  101  |  59<H>  ----------------------------<  104<H>  4.4   |  30  |  3.30<H>    Ca    8.9      07 May 2021 06:49  Phos  3.9     05-07  Mg     2.0     05-07    TPro  7.8  /  Alb  4.0  /  TBili  0.2  /  DBili  x   /  AST  71<H>  /  ALT  50<H>  /  AlkPhos  94  05-05    proBNP:   Lipid Profile:   HgA1c:   TSH:   PT/INR - ( 06 May 2021 07:03 )   PT: 14.2 sec;   INR: 1.19          PTT - ( 07 May 2021 06:49 )  PTT:98.3 sec    ASSESSMENT/PLAN: 	        DVT ppx:  Dispo:

## 2021-05-07 NOTE — PROGRESS NOTE ADULT - ASSESSMENT
Nephrology consulted for CKD stage 4 in 71yo M who admitted with hypertensive emergency, heart failure exacerbation with plan for possible cardiac cath once volume status optimized.      # Nonoliguric CKD stage 4 (eGFR 15-30 ml/min)  - baseline sCr in 3's, appears to be at baseline  - hypervolemia improving, decrease furosemide to 40mg IVP daily starting today and hold on Sunday prior to cardiac cath on Monday, 5/10 keeping on euvolemic or slightly hypervolemic site   - high risk for SANJAY, however if benefits outweigh the risks consider holding diuretics to keep patient euvolemic as well as ACE/ARBs, use less contrast media possible   - monitor lytes, supplement prn to keep K >4, Mg >2  - send urinalysis, urine protein-creatinine ratio, ulytes (Na, Cr, Urea)  - pending renal sono final report   - avoid ACE/ARB/NSAIDS  - renally adjusted meds/ ABx (CrCl <30 ml/min)  - daily weight/ strict in/outs as per CHF exacerbation   - renal diet  HTN- c/w home meds, hold ACE/ARBs  Anemia- Hb at goal, iron panel noted, no need for iron suppl   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

## 2021-05-07 NOTE — PROGRESS NOTE ADULT - PROBLEM SELECTOR PLAN 4
Baseline Cr ( Cr 2.81 11/2020, Cr. 3.26 in 02/2021)   Cr 3.08 on admission. GFR 20s.  -Renal following   -U/A+ 5/5/2021 +protein and blood. Negative leukocyte esterase and nitrites.   -Urine Cx 5/5/2021 -no growth.   -Renally dose meds.  -AVOID NEPHROTOXIC AGENTS  - Retroperotenal US 5/6 prelim revealed Bilaterally increased renal echogenicity consistent with medical renal disease.  -Daily Phosphorous. Currently 4.1> 3.9

## 2021-05-07 NOTE — PROGRESS NOTE ADULT - ASSESSMENT
69yo non-compliant M w/ PMHx of HLD, DM T2, uncontrolled HTN, CAD (questionable Hx of stents, pt denies ), PAD (pt reporting stents in L lower extremity), HFrEF (EF 23% in 2017), COPD (pt w/ home O2 via NC PRN), CKD (baseline Cr 2.8-3.2 ) who is admitted 5/5/21 for acute respiratory distress suspected in the setting of HTN emergency s/p Nitro gtt, currently getting diuresed with lasix 40 IV BID ; initially requiring BIPAP > HFNC. TTE showing LVEF 25-30%.  Initial EKG showed new onset Afib. Pt ruled in NSTEMI w/ Trop 0.09 --> 0.14 w/ development of TWI in inferolateral leads. Patient treated with ASA/Plavix/Heparin with peak Troponin 0.16 with plan for cardiac cath 5/10/21  once euvolemic. Renal also following for CKD (Cr 3.0-3.3 this admission).

## 2021-05-08 LAB
ANION GAP SERPL CALC-SCNC: 11 MMOL/L — SIGNIFICANT CHANGE UP (ref 5–17)
APTT BLD: 115.7 SEC — HIGH (ref 27.5–35.5)
APTT BLD: 71.7 SEC — HIGH (ref 27.5–35.5)
APTT BLD: 87.2 SEC — HIGH (ref 27.5–35.5)
BUN SERPL-MCNC: 58 MG/DL — HIGH (ref 7–23)
CALCIUM SERPL-MCNC: 9 MG/DL — SIGNIFICANT CHANGE UP (ref 8.4–10.5)
CHLORIDE SERPL-SCNC: 101 MMOL/L — SIGNIFICANT CHANGE UP (ref 96–108)
CO2 SERPL-SCNC: 27 MMOL/L — SIGNIFICANT CHANGE UP (ref 22–31)
CREAT SERPL-MCNC: 2.97 MG/DL — HIGH (ref 0.5–1.3)
GLUCOSE SERPL-MCNC: 112 MG/DL — HIGH (ref 70–99)
HCT VFR BLD CALC: 34.7 % — LOW (ref 39–50)
HGB BLD-MCNC: 10.4 G/DL — LOW (ref 13–17)
MAGNESIUM SERPL-MCNC: 2 MG/DL — SIGNIFICANT CHANGE UP (ref 1.6–2.6)
MCHC RBC-ENTMCNC: 27.9 PG — SIGNIFICANT CHANGE UP (ref 27–34)
MCHC RBC-ENTMCNC: 30 GM/DL — LOW (ref 32–36)
MCV RBC AUTO: 93 FL — SIGNIFICANT CHANGE UP (ref 80–100)
NRBC # BLD: 0 /100 WBCS — SIGNIFICANT CHANGE UP (ref 0–0)
PHOSPHATE SERPL-MCNC: 3.4 MG/DL — SIGNIFICANT CHANGE UP (ref 2.5–4.5)
PLATELET # BLD AUTO: 198 K/UL — SIGNIFICANT CHANGE UP (ref 150–400)
POTASSIUM SERPL-MCNC: 4.3 MMOL/L — SIGNIFICANT CHANGE UP (ref 3.5–5.3)
POTASSIUM SERPL-SCNC: 4.3 MMOL/L — SIGNIFICANT CHANGE UP (ref 3.5–5.3)
RBC # BLD: 3.73 M/UL — LOW (ref 4.2–5.8)
RBC # FLD: 13.9 % — SIGNIFICANT CHANGE UP (ref 10.3–14.5)
SODIUM SERPL-SCNC: 139 MMOL/L — SIGNIFICANT CHANGE UP (ref 135–145)
WBC # BLD: 10.81 K/UL — HIGH (ref 3.8–10.5)
WBC # FLD AUTO: 10.81 K/UL — HIGH (ref 3.8–10.5)

## 2021-05-08 PROCEDURE — 99232 SBSQ HOSP IP/OBS MODERATE 35: CPT

## 2021-05-08 PROCEDURE — 99233 SBSQ HOSP IP/OBS HIGH 50: CPT

## 2021-05-08 RX ORDER — ISOSORBIDE MONONITRATE 60 MG/1
30 TABLET, EXTENDED RELEASE ORAL DAILY
Refills: 0 | Status: DISCONTINUED | OUTPATIENT
Start: 2021-05-08 | End: 2021-05-10

## 2021-05-08 RX ADMIN — Medication 40 MILLIGRAM(S): at 06:28

## 2021-05-08 RX ADMIN — CARVEDILOL PHOSPHATE 25 MILLIGRAM(S): 80 CAPSULE, EXTENDED RELEASE ORAL at 18:46

## 2021-05-08 RX ADMIN — ISOSORBIDE MONONITRATE 30 MILLIGRAM(S): 60 TABLET, EXTENDED RELEASE ORAL at 13:29

## 2021-05-08 RX ADMIN — HEPARIN SODIUM 1400 UNIT(S)/HR: 5000 INJECTION INTRAVENOUS; SUBCUTANEOUS at 07:47

## 2021-05-08 RX ADMIN — Medication 100 MILLIGRAM(S): at 06:28

## 2021-05-08 RX ADMIN — Medication 2: at 12:05

## 2021-05-08 RX ADMIN — CEFTRIAXONE 1000 MILLIGRAM(S): 500 INJECTION, POWDER, FOR SOLUTION INTRAMUSCULAR; INTRAVENOUS at 00:17

## 2021-05-08 RX ADMIN — HEPARIN SODIUM 1400 UNIT(S)/HR: 5000 INJECTION INTRAVENOUS; SUBCUTANEOUS at 14:23

## 2021-05-08 RX ADMIN — AZITHROMYCIN 250 MILLIGRAM(S): 500 TABLET, FILM COATED ORAL at 13:30

## 2021-05-08 RX ADMIN — Medication 81 MILLIGRAM(S): at 13:29

## 2021-05-08 RX ADMIN — Medication 100 MILLIGRAM(S): at 18:46

## 2021-05-08 RX ADMIN — CLOPIDOGREL BISULFATE 75 MILLIGRAM(S): 75 TABLET, FILM COATED ORAL at 13:29

## 2021-05-08 RX ADMIN — CEFTRIAXONE 1000 MILLIGRAM(S): 500 INJECTION, POWDER, FOR SOLUTION INTRAMUSCULAR; INTRAVENOUS at 22:12

## 2021-05-08 RX ADMIN — BUDESONIDE AND FORMOTEROL FUMARATE DIHYDRATE 2 PUFF(S): 160; 4.5 AEROSOL RESPIRATORY (INHALATION) at 10:20

## 2021-05-08 RX ADMIN — Medication 200 MILLIGRAM(S): at 19:13

## 2021-05-08 RX ADMIN — AMLODIPINE BESYLATE 10 MILLIGRAM(S): 2.5 TABLET ORAL at 06:28

## 2021-05-08 RX ADMIN — ATORVASTATIN CALCIUM 80 MILLIGRAM(S): 80 TABLET, FILM COATED ORAL at 21:37

## 2021-05-08 RX ADMIN — Medication 100 MILLIGRAM(S): at 13:30

## 2021-05-08 RX ADMIN — HEPARIN SODIUM 1400 UNIT(S)/HR: 5000 INJECTION INTRAVENOUS; SUBCUTANEOUS at 20:36

## 2021-05-08 RX ADMIN — BUDESONIDE AND FORMOTEROL FUMARATE DIHYDRATE 2 PUFF(S): 160; 4.5 AEROSOL RESPIRATORY (INHALATION) at 21:38

## 2021-05-08 RX ADMIN — Medication 100 MILLIGRAM(S): at 21:37

## 2021-05-08 RX ADMIN — TAMSULOSIN HYDROCHLORIDE 0.4 MILLIGRAM(S): 0.4 CAPSULE ORAL at 21:37

## 2021-05-08 RX ADMIN — CARVEDILOL PHOSPHATE 25 MILLIGRAM(S): 80 CAPSULE, EXTENDED RELEASE ORAL at 06:28

## 2021-05-08 NOTE — PROGRESS NOTE ADULT - PROBLEM SELECTOR PLAN 2
-- Pt reports prior to presentation he had substernal chest pain. Patient currently C/P free and HD stable   -- Denying Hx of cardiac stents, but outpatient provider mentioned he may have had stents/MI in past.   -- EKG developed TWI in inferolateral leads. Peak Troponin 0.16 trended down to 0.14.   -- s/p ASA 325mg PO x1 and Plavix 600mg PO x1 load.   -- CONT: Heparin gtt, ASA 81mg PO Daily,  Plavix 75mg PO Daily, Coreg 25mg PO BID, Atorvastatin 80mg PO QHS.  - Plan for cardiac cath 5/10/21, consent in 5 Ur (pt needs to be added to schedule)

## 2021-05-08 NOTE — PROGRESS NOTE ADULT - ASSESSMENT
Nephrology consulted for CKD stage 4 in 71yo M who admitted with hypertensive emergency, heart failure exacerbation with plan for cardiac cath once volume status optimized on Monday     # Nonoliguric CKD stage 4 (eGFR 15-30 ml/min)  - baseline sCr in 3's, appears to be at baseline  - hypervolemia improving, continue with furosemide to 40mg IVP daily and hold on Sunday prior to cardiac cath on Monday  - high risk for SANJAY, however if benefits outweigh the risks consider holding diuretics to keep patient euvolemic as well as ACE/ARBs, use less contrast media possible   - monitor lytes, supplement prn to keep K >4, Mg >2  - avoid ACE/ARB/NSAIDS  - renally adjusted meds/ ABx (CrCl <30 ml/min)  - daily weight/ strict in/outs as per CHF exacerbation   - renal diet  HTN- c/w home meds, hold ACE/ARBs  Anemia- Hb at goal, iron panel noted, no need for iron suppl   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 The nephrology service remains available to assist with any questions or concerns. Please feel free to reach us by paging the on-call nephrology fellow for urgent issues or as below.     Pablo Kuhn M.D.   PGY-4, Nephrology Fellow   C: [phone]   P: [phone]

## 2021-05-08 NOTE — PROGRESS NOTE ADULT - PROBLEM SELECTOR PLAN 3
History of systolic CHF EF 23% by echo 2017.  -- TTE (5/5/21): limited study; LVEF 25-30%, mod-severe hypokinesis in all segments other than basal and mid anteroseptum segments.   -- HOME: Torsemide 20mg PO QD.   - /p IV diuresis 40 BID changed to 40 IV daily (last dose 5/8). Pt euvolemic in exam 5/8 and holding further diuresis in anticipation for cardiac cath 5/10/21 as per d/w Renal team.   -  Ross originally placed in ER now discontinued (passed TOV)  with condom catheter placed.   -Is/Os, Daily Weights. 1 L Fluid restriction. Core Measures.

## 2021-05-08 NOTE — PROGRESS NOTE ADULT - PROBLEM SELECTOR PLAN 5
Leukocytosis on admission WBC 17 with left shift which initially resolved but WBC 12> 10 . Patient currently afebrile.   -Patient reports productive cough with white sputum which has been presented since admission  -CXR 5/5/2021: Bilateral opacities/right pleural effusion.  -- ABX: Ceftriaxone 1000mg IV q 24hrs x 7 days and Azithromycin 500mg PO Daily x 1 followed by Azithromycin 250mg PO Daily x 4 days.   -- Continue to monitor; please send Cx again if pt febrile.  - Procal 3.92 5/7; pulm consulted; recs to c/w abx and have pt follow up his pulmonologist as o/p.

## 2021-05-08 NOTE — PHYSICAL THERAPY INITIAL EVALUATION ADULT - GENERAL OBSERVATIONS, REHAB EVAL
Received sitting in chair in NAD, denies pain +IV, EKG, o2 NC 2L, 94% on room air. left as found 93% on room air, lines intact, RN Xochilt glover, call ochoa

## 2021-05-08 NOTE — PROGRESS NOTE ADULT - ATTENDING COMMENTS
CKD with stable Cr 3 at baseline, HTN well controlled. Plans for cardiac cath early next week per team

## 2021-05-08 NOTE — PROGRESS NOTE ADULT - ATTENDING COMMENTS
See PA note written above, for details. I reviewed the PA documentation.  I have personally seen and examined this patient today. I reviewed vitals, labs, medications, cardiac studies and additional imaging.  I agree with the PA's findings and plans as written above with the following additions/amendments:  70M with HLD, DM T2, uncontrolled HTN, CAD, PAD, Chronic HFrEF (EF 23% in 2017), COPD (on home O2), CKD IV admitted with HTN emergency, acute on chronic systolic, NSTEMI and new onset A fib. Patient titrated off nitro gtt now on oral agents. Ischemic evaluation pending. paroxysmal afib now in NSR. Patient euvolemic on exam  Plan for:   s/p Lasix 40mg iv x1 today  No diuretics on SUNDAY in anticipation for contrast load monday  LHC for coronary evaluation planned Monday 5/10  Cont IV heparin while awaiting LHC  Transition to NOAC post cath for AFib CVA risk reduction  DAPT + Atorva 80  Imdur 30 daily added for antianginal + BP regimen  Hydralazine 100 TID, Coreg 25 BID, Amlodipine 10 daily  Azithro/CTX x5D for CAP treatment, check PCL  PT deemed no needs; patient to receive rx for outpatient cardiac rehab upon discharge  Case discussed with patient and cardiac care team  Twila Bass M.D.  Cardiology Attending

## 2021-05-08 NOTE — PROGRESS NOTE ADULT - PROBLEM SELECTOR PLAN 1
-- Presented w/ HTN emergency (likely secondary to non-compliance as patient reports forgetting to take meds) /131 w/ acute respiratory failure 2/2 flash pulmonary edema likely due to HTN.   - s/p Nitro gtt  -- CONT: home Coreg 25mg PO BID, Hydralazine 100mg PO TID. Amlodipine 10 mg daily added to regimen (on norvasc 5 mg at home). also started Imdur 30 mg daily 5/8  -- PLAN: continue to monitor BP, and reintroduce home regimen as appropriate. Home BP regimen: Coreg 25mg PO BID (continue)- Hydralazine 100mg PO TID (continue)- Imdur 60mg PO QD (currently on imdur 30)- Amlodipine 5mg PO QD (increased to 10 mg daily )- Clonidine 0.1mg PO BID (holding),  Losartan 100mg PO QD (holding)

## 2021-05-08 NOTE — PROGRESS NOTE ADULT - SUBJECTIVE AND OBJECTIVE BOX
Interventional Cardiology PA Adult Progress Note    CC: hypertensive emergency  Subjective Assessment: Pt. seen and examined at bedside today am. pt. comfortable; sitting on chair, denies any chest pain, SOB, dizziness, palpitations, N/V.     ROS neg except as per subjective HPI.   	  MEDICATIONS:  amLODIPine   Tablet 10 milliGRAM(s) Oral daily  carvedilol 25 milliGRAM(s) Oral every 12 hours  hydrALAZINE 100 milliGRAM(s) Oral every 8 hours  isosorbide   mononitrate ER Tablet (IMDUR) 30 milliGRAM(s) Oral daily  tamsulosin 0.4 milliGRAM(s) Oral at bedtime  azithromycin   Tablet 250 milliGRAM(s) Oral daily  cefTRIAXone Injectable. 1000 milliGRAM(s) IV Push every 24 hours  budesonide  80 MICROgram(s)/formoterol 4.5 MICROgram(s) Inhaler 2 Puff(s) Inhalation two times a day        allopurinol 100 milliGRAM(s) Oral two times a day  atorvastatin 80 milliGRAM(s) Oral at bedtime  dextrose 40% Gel 15 Gram(s) Oral once  dextrose 50% Injectable 25 Gram(s) IV Push once  dextrose 50% Injectable 12.5 Gram(s) IV Push once  dextrose 50% Injectable 25 Gram(s) IV Push once  glucagon  Injectable 1 milliGRAM(s) IntraMuscular once  insulin lispro (ADMELOG) corrective regimen sliding scale   SubCutaneous Before meals and at bedtime    aspirin  chewable 81 milliGRAM(s) Oral daily  clopidogrel Tablet 75 milliGRAM(s) Oral daily  dextrose 5%. 1000 milliLiter(s) IV Continuous <Continuous>  dextrose 5%. 1000 milliLiter(s) IV Continuous <Continuous>  heparin   Injectable 7000 Unit(s) IV Push every 6 hours PRN  heparin   Injectable 3500 Unit(s) IV Push every 6 hours PRN  heparin  Infusion.  Unit(s)/Hr IV Continuous <Continuous>      	    [PHYSICAL EXAM:  TELEMETRY:  T(C): 36.4 (05-08-21 @ 09:17), Max: 37.3 (05-08-21 @ 05:50)  HR: 80 (05-08-21 @ 09:15) (68 - 80)  BP: 137/76 (05-08-21 @ 09:15) (135/63 - 164/74)  RR: 18 (05-08-21 @ 08:34) (18 - 20)  SpO2: 94% (05-08-21 @ 09:15) (94% - 99%)  Wt(kg): --  I&O's Summary    07 May 2021 07:01  -  08 May 2021 07:00  --------------------------------------------------------  IN: 840 mL / OUT: 3525 mL / NET: -2685 mL    08 May 2021 07:01  -  08 May 2021 11:13  --------------------------------------------------------  IN: 180 mL / OUT: 300 mL / NET: -120 mL        Ross:  Central/PICC/Mid Line:                                             Appearance: Normal	  HEENT:   Normal oral mucosa, PERRL, EOMI	  Neck: Supple. No JVD.   Cardiovascular: + S1 S2. RRR. No murmurs.   Respiratory: decreased breath sounds b/l lower bases,  No W/R/R.   Gastrointestinal:  BS x 4. Soft NT/ND  Skin: No rashes, No ecchymoses, No cyanosis  Extremities: Normal range of motion, No clubbing, cyanosis or edema BLE. No calf tenderness BLE.   Vascular: Peripheral pulses palpable 2+ bilaterally  Neurologic: Non-focal  Psychiatry: A & O x 3, Mood & affect appropriate    	    ECG:  	  RADIOLOGY:   DIAGNOSTIC TESTING:  [ ] Echocardiogram:  [ ]  Catheterization:  [ ] Stress Test:    [ ] MARSHALL  OTHER: 	    LABS:	 	  CARDIAC MARKERS:                                  10.4   10.81 )-----------( 198      ( 08 May 2021 07:15 )             34.7     05-08    139  |  101  |  58<H>  ----------------------------<  112<H>  4.3   |  27  |  2.97<H>    Ca    9.0      08 May 2021 07:15  Phos  3.4     05-08  Mg     2.0     05-08      proBNP:   Lipid Profile:   HgA1c:   TSH:   PTT - ( 08 May 2021 07:14 )  PTT:115.7 sec    ASSESSMENT/PLAN: 	        DVT ppx:  Dispo:

## 2021-05-08 NOTE — PROGRESS NOTE ADULT - PROBLEM SELECTOR PLAN 4
Baseline Cr ( Cr 2.81 11/2020, Cr. 3.26 in 02/2021)   - Cr 3.08 on admission. GFR 20s. Cr. 2.97 5/8  -Renal following ; appreciate recs  -U/A+ 5/5/2021 +protein and blood. Negative leukocyte esterase and nitrites.   -Urine Cx 5/5/2021 -no growth.   -Renally dose meds.  -AVOID NEPHROTOXIC AGENTS  - Retroperotenal US 5/6 prelim revealed Bilaterally increased renal echogenicity consistent with medical renal disease.  -Daily Phosphorous. Currently 4.1> 3.9>3.4

## 2021-05-08 NOTE — PROGRESS NOTE ADULT - ASSESSMENT
71yo non-compliant M w/ PMHx of HLD, DM T2, uncontrolled HTN, CAD (questionable Hx of stents, pt denies ), PAD (pt reporting stents in L lower extremity), HFrEF (EF 23% in 2017), COPD (pt w/ home O2 via NC PRN), CKD (baseline Cr 2.8-3.2 ) who is admitted 5/5/21 for acute respiratory distress suspected in the setting of HTN emergency s/p Nitro gtt, s/p IV diuresis 40 BID changed to 40 IV daily (last dose 5/8), initially requiring BIPAP > HFNC > NC (now satting 92 on RA). TTE showing LVEF 25-30%.  Initial EKG showed new onset Afib. Pt ruled in NSTEMI w/ peak trop 0.16 w/ development of TWI in inferolateral leads. Patient treated with ASA/Plavix/Heparin with plan for cardiac cath 5/10/21. Renal also following for CKD (Cr 3.0-3.3 this admission).

## 2021-05-08 NOTE — PROGRESS NOTE ADULT - SUBJECTIVE AND OBJECTIVE BOX
Patient is a 70y Male seen and evaluated at bedside.   no complaints  denies CP SOB  for cath Monday; s/p lasix 40mg IV this AM    3.5L UOP 2/4h  net negative 2.5L/24h     Meds:    allopurinol 100 two times a day  amLODIPine   Tablet 10 daily  aspirin  chewable 81 daily  atorvastatin 80 at bedtime  azithromycin   Tablet 250 daily  budesonide  80 MICROgram(s)/formoterol 4.5 MICROgram(s) Inhaler 2 two times a day  carvedilol 25 every 12 hours  cefTRIAXone Injectable. 1000 every 24 hours  clopidogrel Tablet 75 daily  dextrose 40% Gel 15 once  dextrose 5%. 1000 <Continuous>  dextrose 5%. 1000 <Continuous>  dextrose 50% Injectable 25 once  dextrose 50% Injectable 12.5 once  dextrose 50% Injectable 25 once  glucagon  Injectable 1 once  heparin   Injectable 7000 every 6 hours PRN  heparin   Injectable 3500 every 6 hours PRN  heparin  Infusion.  <Continuous>  hydrALAZINE 100 every 8 hours  insulin lispro (ADMELOG) corrective regimen sliding scale  Before meals and at bedtime  isosorbide   mononitrate ER Tablet (IMDUR) 30 daily  tamsulosin 0.4 at bedtime      T(C): , Max: 37.3 (05-08-21 @ 05:50)  T(F): , Max: 99.2 (05-08-21 @ 05:50)  HR: 80 (05-08-21 @ 09:15)  BP: 137/76 (05-08-21 @ 09:15)  BP(mean): --  RR: 18 (05-08-21 @ 08:34)  SpO2: 94% (05-08-21 @ 09:15)  Wt(kg): --    05-07 @ 07:01  -  05-08 @ 07:00  --------------------------------------------------------  IN: 840 mL / OUT: 3525 mL / NET: -2685 mL    05-08 @ 07:01  -  05-08 @ 13:11  --------------------------------------------------------  IN: 180 mL / OUT: 300 mL / NET: -120 mL          REVIEW OF SYSTEMS:  Gen: No fever  CVS: No chest pain  Resp: No shortness of breath  Abd: No nausea/ No vomiting/No abdominal pain  CNS: No headache      PHYSICAL EXAM:  GENERAL: alert, no acute distress at present, on RA sitting in chair comfortably   CHEST/LUNG: decreased breath sounds bilaterally  HEART: normal S1S2, RRR  ABDOMEN: Soft  EXTREMITIES: No LE edema bilateral      LABS:                        10.4   10.81 )-----------( 198      ( 08 May 2021 07:15 )             34.7     05-08    139  |  101  |  58<H>  ----------------------------<  112<H>  4.3   |  27  |  2.97<H>    Ca    9.0      08 May 2021 07:15  Phos  3.4     05-08  Mg     2.0     05-08        PTT - ( 08 May 2021 07:14 )  PTT:115.7 sec    Chloride, Random Urine: 44 mmol/L (05-07 @ 15:25)  Creatinine, Random Urine: 92 mg/dL (05-07 @ 15:25)  Osmolality, Random Urine: 407 mosm/kg (05-07 @ 15:25)  Sodium, Random Urine: 54 mmol/L (05-07 @ 15:25)        RADIOLOGY & ADDITIONAL STUDIES:

## 2021-05-08 NOTE — PROGRESS NOTE ADULT - PROBLEM SELECTOR PLAN 8
-- CONT: Tamsulosin 0.4mg PO QHS.         DVT ppx: heparin gtt  Dispo:  plan for cath 5/10  CAse d/w Dr. Siddiqi

## 2021-05-08 NOTE — PHYSICAL THERAPY INITIAL EVALUATION ADULT - PERTINENT HX OF CURRENT PROBLEM, REHAB EVAL
70M who presented to [facility] ED on 5/5/21 in acute respiratory distress, HTN emergency. Pt reporting some non-compliance w/ medications recently. States he has felt "off" over the past week and today he experienced chest pain, dizziness, and acute onset of shortness of breath.

## 2021-05-08 NOTE — PROGRESS NOTE ADULT - PROBLEM SELECTOR PLAN 6
Presumed to be new onset. No history of Afib and not currently on AC at home  -Currently in NSR.  Continue to monitor HR  - hep gtt, will need to be d/c on NOAC

## 2021-05-09 DIAGNOSIS — J18.9 PNEUMONIA, UNSPECIFIED ORGANISM: ICD-10-CM

## 2021-05-09 DIAGNOSIS — E11.9 TYPE 2 DIABETES MELLITUS WITHOUT COMPLICATIONS: ICD-10-CM

## 2021-05-09 LAB
ANION GAP SERPL CALC-SCNC: 6 MMOL/L — SIGNIFICANT CHANGE UP (ref 5–17)
APTT BLD: 90.4 SEC — HIGH (ref 27.5–35.5)
BUN SERPL-MCNC: 54 MG/DL — HIGH (ref 7–23)
CALCIUM SERPL-MCNC: 9.1 MG/DL — SIGNIFICANT CHANGE UP (ref 8.4–10.5)
CHLORIDE SERPL-SCNC: 106 MMOL/L — SIGNIFICANT CHANGE UP (ref 96–108)
CO2 SERPL-SCNC: 32 MMOL/L — HIGH (ref 22–31)
CREAT SERPL-MCNC: 2.77 MG/DL — HIGH (ref 0.5–1.3)
GLUCOSE SERPL-MCNC: 122 MG/DL — HIGH (ref 70–99)
HCT VFR BLD CALC: 33.5 % — LOW (ref 39–50)
HGB BLD-MCNC: 9.8 G/DL — LOW (ref 13–17)
INR BLD: 1.04 — SIGNIFICANT CHANGE UP (ref 0.88–1.16)
MAGNESIUM SERPL-MCNC: 2.1 MG/DL — SIGNIFICANT CHANGE UP (ref 1.6–2.6)
MCHC RBC-ENTMCNC: 27.3 PG — SIGNIFICANT CHANGE UP (ref 27–34)
MCHC RBC-ENTMCNC: 29.3 GM/DL — LOW (ref 32–36)
MCV RBC AUTO: 93.3 FL — SIGNIFICANT CHANGE UP (ref 80–100)
NRBC # BLD: 0 /100 WBCS — SIGNIFICANT CHANGE UP (ref 0–0)
PHOSPHATE SERPL-MCNC: 3.6 MG/DL — SIGNIFICANT CHANGE UP (ref 2.5–4.5)
PLATELET # BLD AUTO: 195 K/UL — SIGNIFICANT CHANGE UP (ref 150–400)
POTASSIUM SERPL-MCNC: 4.5 MMOL/L — SIGNIFICANT CHANGE UP (ref 3.5–5.3)
POTASSIUM SERPL-SCNC: 4.5 MMOL/L — SIGNIFICANT CHANGE UP (ref 3.5–5.3)
PROTHROM AB SERPL-ACNC: 12.4 SEC — SIGNIFICANT CHANGE UP (ref 10.6–13.6)
RBC # BLD: 3.59 M/UL — LOW (ref 4.2–5.8)
RBC # FLD: 13.8 % — SIGNIFICANT CHANGE UP (ref 10.3–14.5)
SARS-COV-2 RNA SPEC QL NAA+PROBE: SIGNIFICANT CHANGE UP
SODIUM SERPL-SCNC: 144 MMOL/L — SIGNIFICANT CHANGE UP (ref 135–145)
WBC # BLD: 9.33 K/UL — SIGNIFICANT CHANGE UP (ref 3.8–10.5)
WBC # FLD AUTO: 9.33 K/UL — SIGNIFICANT CHANGE UP (ref 3.8–10.5)

## 2021-05-09 PROCEDURE — 93976 VASCULAR STUDY: CPT | Mod: 26

## 2021-05-09 PROCEDURE — 99233 SBSQ HOSP IP/OBS HIGH 50: CPT

## 2021-05-09 RX ADMIN — CEFTRIAXONE 1000 MILLIGRAM(S): 500 INJECTION, POWDER, FOR SOLUTION INTRAMUSCULAR; INTRAVENOUS at 23:35

## 2021-05-09 RX ADMIN — Medication 100 MILLIGRAM(S): at 13:10

## 2021-05-09 RX ADMIN — CLOPIDOGREL BISULFATE 75 MILLIGRAM(S): 75 TABLET, FILM COATED ORAL at 13:09

## 2021-05-09 RX ADMIN — Medication 81 MILLIGRAM(S): at 13:10

## 2021-05-09 RX ADMIN — TAMSULOSIN HYDROCHLORIDE 0.4 MILLIGRAM(S): 0.4 CAPSULE ORAL at 22:26

## 2021-05-09 RX ADMIN — CARVEDILOL PHOSPHATE 25 MILLIGRAM(S): 80 CAPSULE, EXTENDED RELEASE ORAL at 06:47

## 2021-05-09 RX ADMIN — AZITHROMYCIN 250 MILLIGRAM(S): 500 TABLET, FILM COATED ORAL at 13:10

## 2021-05-09 RX ADMIN — CARVEDILOL PHOSPHATE 25 MILLIGRAM(S): 80 CAPSULE, EXTENDED RELEASE ORAL at 18:03

## 2021-05-09 RX ADMIN — BUDESONIDE AND FORMOTEROL FUMARATE DIHYDRATE 2 PUFF(S): 160; 4.5 AEROSOL RESPIRATORY (INHALATION) at 10:16

## 2021-05-09 RX ADMIN — BUDESONIDE AND FORMOTEROL FUMARATE DIHYDRATE 2 PUFF(S): 160; 4.5 AEROSOL RESPIRATORY (INHALATION) at 22:24

## 2021-05-09 RX ADMIN — HEPARIN SODIUM 1400 UNIT(S)/HR: 5000 INJECTION INTRAVENOUS; SUBCUTANEOUS at 08:04

## 2021-05-09 RX ADMIN — Medication 2: at 22:23

## 2021-05-09 RX ADMIN — ISOSORBIDE MONONITRATE 30 MILLIGRAM(S): 60 TABLET, EXTENDED RELEASE ORAL at 13:09

## 2021-05-09 RX ADMIN — AMLODIPINE BESYLATE 10 MILLIGRAM(S): 2.5 TABLET ORAL at 06:47

## 2021-05-09 RX ADMIN — Medication 100 MILLIGRAM(S): at 06:46

## 2021-05-09 RX ADMIN — Medication 100 MILLIGRAM(S): at 18:03

## 2021-05-09 RX ADMIN — ATORVASTATIN CALCIUM 80 MILLIGRAM(S): 80 TABLET, FILM COATED ORAL at 22:26

## 2021-05-09 RX ADMIN — Medication 2: at 12:25

## 2021-05-09 RX ADMIN — Medication 100 MILLIGRAM(S): at 22:26

## 2021-05-09 NOTE — PROGRESS NOTE ADULT - PROBLEM SELECTOR PLAN 5
Leukocytosis on admission WBC 17 with left shift which initially resolved but WBC 12> 10 . Patient currently afebrile.   -Patient reports productive cough with white sputum which has been presented since admission  -CXR 5/5/2021: Bilateral opacities/right pleural effusion.  -- ABX: Ceftriaxone 1000mg IV q 24hrs x 7 days and Azithromycin 500mg PO Daily x 1 followed by Azithromycin 250mg PO Daily x 4 days.   -- Continue to monitor; please send Cx again if pt febrile.  - Procal 3.92 5/7; pulm consulted; recs to c/w abx and have pt follow up his pulmonologist as o/p. non oliguric CKD IV, baseline Cr 3; Renal following  -Cr peak at 3.3, now downtrending to 2.77 today  -UA with protein and blood, Urine Lytes WNL  -RP US prelim read revealed B/L increased renal echogenicity consistent with medical renal disease  -Hold home Losartan  -Renal dose meds, avoid nephrotoxic agents and monitor UOP

## 2021-05-09 NOTE — PROGRESS NOTE ADULT - PROBLEM SELECTOR PLAN 9
-Continue Tamsulosin 0.4mg QD    F: None  E: Replete if K<4 or Mag<2  N: DASH Diet with 1L fluid restriction  VTEppx: Heparin gtt  Dispo: pending cath Monday

## 2021-05-09 NOTE — PROGRESS NOTE ADULT - ASSESSMENT
71yo non-compliant Male, former smoker, with PMHx of uncontrolled HTN, HLD, DM-II, CAD, PAD (s/p stents in L leg, per pt), sCHF (EF 25-30%), COPD (on home O2 PRN) and CKD (baseline Cr 3) who presented c/o SOB, found to be in hypoxic hypercapnic respiratory failure in setting of HTN emergency with flash pulm edema, 2/2 medication non compliance. Pt R/I NSTEMI, peak Trop 0.16 with TWI in inferolaterals leads, plan for cardiac cath Monday. Pt also found to be in new Afib, now s/p self converted to NSR. Hospital course significant for uptrending leukocytosis and currently on IV abx for suspected CAP. 71yo non-compliant Male, former smoker, with PMHx of uncontrolled HTN, HLD, DM-II, CAD, PAD (s/p stents in L leg, per pt), sCHF (EF 25-30%), COPD (on home O2 PRN) and CKD (baseline Cr 3) who presented c/o SOB, found to be in acute hypoxic respiratory failure requiring BIPAP in setting of HTN emergency with flash pulm edema, 2/2 medication non compliance. Pt R/I NSTEMI, peak Trop 0.16 with TWI in inferolaterals leads, plan for cardiac cath Monday. Pt also found to be in new Afib, now s/p self conversion to NSR. Hospital course significant for uptrending leukocytosis and currently on IV abx for suspected CAP.

## 2021-05-09 NOTE — PROGRESS NOTE ADULT - PROBLEM SELECTOR PLAN 2
Presented with substernal CP, currently CP free and HD stable  -Trop peak 0.16 and downtrended to 0.14  -EKG developed TWI in inferolateral leads  -Plan for cardiac cath Monday, pt consented, NPO after MN and s/p ASA 325mg and Plavix 600mg load  -Continue Heparin gtt, ASA 81mg QD, Plavix 75mg QD, Coreg 25mg BID, Atorvastatin 80mg HS euvolemic on exam, SpO2 97% on 2L NC and net neg 9L overall  -BNP 3K, CXR with B/L opacities/right pleural effusion  -Echo 5/5/21: EF 25-30%, with the exception of basal and mid anteroseptum, the remaining segments are mod-severely hypokinetic, borderline dilated LA, aortic root mildly dilated (4cm)  -s/p Lasix IV, holding diuresis in anticipation of Cath Monday  -Continue Carvedilol 25mg BID and Amlodipine 10mg QD. Holding home Losartan 2/2 elevated Cr  -Core Measures, daily weight, strict I&Os with 1L fluid restriction and condom cath

## 2021-05-09 NOTE — PROGRESS NOTE ADULT - PROBLEM SELECTOR PLAN 8
-Continue Tamsulosin 0.4mg QD    F: None  E: Replete if K<4 or Mag<2  N: DASH Diet with 1L fluid restriction  VTEppx: Heparin gtt  Dispo: pending cath Monday A1c 4.5  -holding home Januvia and continue mISS and FS

## 2021-05-09 NOTE — PROGRESS NOTE ADULT - ATTENDING COMMENTS
See PA note written above, for details. I reviewed the PA documentation.  I have personally seen and examined this patient today. I reviewed vitals, labs, medications, cardiac studies and additional imaging.  I agree with the PA's findings and plans as written above with the following additions/amendments:  70M with HLD, DM T2, uncontrolled HTN, CAD, PAD, Chronic HFrEF (EF 23% in 2017), COPD (on home O2), CKD IV admitted with HTN emergency, acute on chronic systolic, NSTEMI and new onset A fib. Patient titrated off nitro gtt now on oral agents. Ischemic evaluation pending. paroxysmal afib now in NSR.   Patient euvolemic on exam today, able to lay supine in bed at zero degrees without orthopnea, no dyspnea, clear lungs, no EVGENY  Plan for:   No diuretics today in anticipation for contrast load 5/10  [facility] for coronary evaluation planned Monday 5/10  Cont IV heparin while awaiting [facility]  Transition to NOAC post cath for AFib CVA risk reduction  DAPT + Atorva 80  Hydralazine 100 TID, Coreg 25 BID, Amlodipine 10 daily, Imdur 30 daily  Azithro/CTX x5D for CAP treatment, check PCL  PT deemed no needs; patient to receive rx for outpatient cardiac rehab upon discharge  NPO pMN tonight for [facility] ischemic evaluation Monday  Case discussed with patient and cardiac care team  Twila Bass M.D.  Cardiology Attending

## 2021-05-09 NOTE — PROGRESS NOTE ADULT - PROBLEM SELECTOR PLAN 4
non oliguric CKD IV, baseline Cr 3; Renal following  -Cr peak at 3.3, now downtrending to 2.77 today  -UA with protein and blood, Urine Lytes WNL  -RP US prelim read revealed B/L increased renal echogenicity consistent with medical renal disease  -Hold home Losartan  -Renal dose meds, avoid nephrotoxic agents and monitor UOP presented in acute hypoxic respitartory failure requiring BIPAP > HFNC, endorsing productive cough with white sputum, WBC 17.94 with left shift and uptrending procal to 3.92  -pt has remained afebrile, WBC has downtrended to normal and satting 98% on 2L NC  -CXR with B/L opacities/right pleural effusion  -Blood Cx NGTD  -Continue Ceftriaxone 1g QD x 7 days, Azithromycin x 5 days, Symbicort and Guaifenesin Q6 PRN

## 2021-05-09 NOTE — PROGRESS NOTE ADULT - PROBLEM SELECTOR PLAN 3
euvolemic on exam, SpO2 97% on 2L NC and net neg 9L overall  -BNP 3K, CXR with B/L opacities/right pleural effusion  -Echo 5/5/21: EF 25-30%, with the exception of basal and mid anteroseptum, the remaining segments are mod-severely hypokinetic, borderline dilated LA, aortic root mildly dilated (4cm)  -s/p Lasix IV, holding diuresis in anticipation of Cath Monday  -Continue Carvedilol 25mg BID and Amlodipine 10mg QD. Holding home Losartan 2/2 elevated Cr  -Core Measures, daily weight, strict I&Os with 1L fluid restriction and condom cath presented with /131 in the setting of medication non compliance, currently SBP 140s  -s/p Nitro gtt  -Continue Carvedilol 25mg BID, Hydralazine 100mg TID, Imdur 30mg QD and Amlodipine 10mg QD  -Holding home Losartan 100mg for anticipated cardiac cath Monday  -Holding home Clonidine 0.1mg BID

## 2021-05-09 NOTE — PROGRESS NOTE ADULT - SUBJECTIVE AND OBJECTIVE BOX
Cardiology PA Adult Progress Note    Subjective Assessment:  	  MEDICATIONS:  amLODIPine   Tablet 10 milliGRAM(s) Oral daily  carvedilol 25 milliGRAM(s) Oral every 12 hours  hydrALAZINE 100 milliGRAM(s) Oral every 8 hours  isosorbide   mononitrate ER Tablet (IMDUR) 30 milliGRAM(s) Oral daily  tamsulosin 0.4 milliGRAM(s) Oral at bedtime  azithromycin   Tablet 250 milliGRAM(s) Oral daily  cefTRIAXone Injectable. 1000 milliGRAM(s) IV Push every 24 hours  budesonide  80 MICROgram(s)/formoterol 4.5 MICROgram(s) Inhaler 2 Puff(s) Inhalation two times a day  guaiFENesin Oral Liquid (Sugar-Free) 200 milliGRAM(s) Oral every 6 hours PRN  allopurinol 100 milliGRAM(s) Oral two times a day  atorvastatin 80 milliGRAM(s) Oral at bedtime  glucagon  Injectable 1 milliGRAM(s) IntraMuscular once  insulin lispro (ADMELOG) corrective regimen sliding scale   SubCutaneous Before meals and at bedtime  aspirin  chewable 81 milliGRAM(s) Oral daily  clopidogrel Tablet 75 milliGRAM(s) Oral daily  heparin  Infusion.  Unit(s)/Hr IV Continuous <Continuous>    VITAL SIGNS:  T(C): 37.4 (05-09-21 @ 05:54), Max: 37.4 (05-09-21 @ 05:54)  HR: 81 (05-09-21 @ 08:06) (68 - 87)  BP: 149/70 (05-09-21 @ 08:06) (137/76 - 177/78)  RR: 18 (05-09-21 @ 08:06) (18 - 18)  SpO2: 97% (05-09-21 @ 08:06) (92% - 98%)    I&O's Summary  08 May 2021 07:01  -  09 May 2021 07:00  --------------------------------------------------------  IN: 720 mL / OUT: 1700 mL / NET: -980 mL                                       PHYSICAL EXAM:  Appearance: Normal	  HEENT: Normal oral mucosa, PERRL, EOMI	  Neck: Supple, + JVD/ - JVD; Carotid Bruit   Cardiovascular: Normal S1 S2, No JVD, No murmurs,   Respiratory: Lungs clear to auscultation/Decreased Breath Sounds/No Rales, Rhonchi, Wheezing	  Gastrointestinal:  Soft, Non-tender, + BS	  Skin: No rashes, No ecchymoses, No cyanosis  Extremities: Normal range of motion, No clubbing, cyanosis or edema  Vascular: Peripheral pulses palpable 2+ bilaterally  Neurologic: Non-focal  Psychiatry: A & O x 3, Mood & affect appropriate    LABS:	 	             9.8    9.33  )-----------( 195      ( 09 May 2021 07:09 )             33.5     144  |  106  |  54<H>  ----------------------------<  122<H>  4.5   |  32<H>  |  2.77<H>    Ca    9.1      09 May 2021 07:09  Phos  3.6     05-09  Mg     2.1     05-09  PT/INR - ( 09 May 2021 07:09 )   PT: 12.4 sec;   INR: 1.04      PTT - ( 09 May 2021 07:09 )  PTT:90.4 sec   Cardiology PA Adult Progress Note    Subjective Assessment: Pt seen and examined at bedside this AM without any complaints or events overnight. He states that   	  MEDICATIONS:  amLODIPine   Tablet 10 milliGRAM(s) Oral daily  carvedilol 25 milliGRAM(s) Oral every 12 hours  hydrALAZINE 100 milliGRAM(s) Oral every 8 hours  isosorbide   mononitrate ER Tablet (IMDUR) 30 milliGRAM(s) Oral daily  tamsulosin 0.4 milliGRAM(s) Oral at bedtime  azithromycin   Tablet 250 milliGRAM(s) Oral daily  cefTRIAXone Injectable. 1000 milliGRAM(s) IV Push every 24 hours  budesonide  80 MICROgram(s)/formoterol 4.5 MICROgram(s) Inhaler 2 Puff(s) Inhalation two times a day  guaiFENesin Oral Liquid (Sugar-Free) 200 milliGRAM(s) Oral every 6 hours PRN  allopurinol 100 milliGRAM(s) Oral two times a day  atorvastatin 80 milliGRAM(s) Oral at bedtime  glucagon  Injectable 1 milliGRAM(s) IntraMuscular once  insulin lispro (ADMELOG) corrective regimen sliding scale   SubCutaneous Before meals and at bedtime  aspirin  chewable 81 milliGRAM(s) Oral daily  clopidogrel Tablet 75 milliGRAM(s) Oral daily  heparin  Infusion.  Unit(s)/Hr IV Continuous <Continuous>    VITAL SIGNS:  T(C): 37.4 (05-09-21 @ 05:54), Max: 37.4 (05-09-21 @ 05:54)  HR: 81 (05-09-21 @ 08:06) (68 - 87)  BP: 149/70 (05-09-21 @ 08:06) (137/76 - 177/78)  RR: 18 (05-09-21 @ 08:06) (18 - 18)  SpO2: 97% (05-09-21 @ 08:06) (92% - 98%)    I&O's Summary  08 May 2021 07:01  -  09 May 2021 07:00  --------------------------------------------------------  IN: 720 mL / OUT: 1700 mL / NET: -980 mL                                       PHYSICAL EXAM:  Appearance: Normal	  HEENT: Normal oral mucosa, PERRL, EOMI	  Neck: Supple, + JVD/ - JVD; Carotid Bruit   Cardiovascular: Normal S1 S2, No JVD, No murmurs,   Respiratory: Lungs clear to auscultation/Decreased Breath Sounds/No Rales, Rhonchi, Wheezing	  Gastrointestinal:  Soft, Non-tender, + BS	  Skin: No rashes, No ecchymoses, No cyanosis  Extremities: Normal range of motion, No clubbing, cyanosis or edema  Vascular: Peripheral pulses palpable 2+ bilaterally  Neurologic: Non-focal  Psychiatry: A & O x 3, Mood & affect appropriate    LABS:	 	             9.8    9.33  )-----------( 195      ( 09 May 2021 07:09 )             33.5     144  |  106  |  54<H>  ----------------------------<  122<H>  4.5   |  32<H>  |  2.77<H>    Ca    9.1      09 May 2021 07:09  Phos  3.6     05-09  Mg     2.1     05-09  PT/INR - ( 09 May 2021 07:09 )   PT: 12.4 sec;   INR: 1.04      PTT - ( 09 May 2021 07:09 )  PTT:90.4 sec   Cardiology PA Adult Progress Note    Subjective Assessment: Pt seen and examined at bedside this AM without any complaints or events overnight. He endorses persistent cough, however improving. He denies any CP, palpitations, dizziness/lightheadedness, SOB, or orthopnea.  	  MEDICATIONS:  amLODIPine   Tablet 10 milliGRAM(s) Oral daily  carvedilol 25 milliGRAM(s) Oral every 12 hours  hydrALAZINE 100 milliGRAM(s) Oral every 8 hours  isosorbide   mononitrate ER Tablet (IMDUR) 30 milliGRAM(s) Oral daily  tamsulosin 0.4 milliGRAM(s) Oral at bedtime  azithromycin   Tablet 250 milliGRAM(s) Oral daily  cefTRIAXone Injectable. 1000 milliGRAM(s) IV Push every 24 hours  budesonide  80 MICROgram(s)/formoterol 4.5 MICROgram(s) Inhaler 2 Puff(s) Inhalation two times a day  guaiFENesin Oral Liquid (Sugar-Free) 200 milliGRAM(s) Oral every 6 hours PRN  allopurinol 100 milliGRAM(s) Oral two times a day  atorvastatin 80 milliGRAM(s) Oral at bedtime  glucagon  Injectable 1 milliGRAM(s) IntraMuscular once  insulin lispro (ADMELOG) corrective regimen sliding scale   SubCutaneous Before meals and at bedtime  aspirin  chewable 81 milliGRAM(s) Oral daily  clopidogrel Tablet 75 milliGRAM(s) Oral daily  heparin  Infusion.  Unit(s)/Hr IV Continuous <Continuous>    VITAL SIGNS:  T(C): 37.4 (05-09-21 @ 05:54), Max: 37.4 (05-09-21 @ 05:54)  HR: 81 (05-09-21 @ 08:06) (68 - 87)  BP: 149/70 (05-09-21 @ 08:06) (137/76 - 177/78)  RR: 18 (05-09-21 @ 08:06) (18 - 18)  SpO2: 97% (05-09-21 @ 08:06) (92% - 98%)    I&O's Summary  08 May 2021 07:01  -  09 May 2021 07:00  --------------------------------------------------------  IN: 720 mL / OUT: 1700 mL / NET: -980 mL                                       PHYSICAL EXAM:  Appearance: Normal	  HEENT: Normal oral mucosa, PERRL, EOMI	  Neck: Supple,  - JVD; Carotid Bruit   Cardiovascular: Normal S1 S2, No JVD, No murmurs,   Respiratory: Lungs clear to auscultation, No Rales, Rhonchi, Wheezing	  Gastrointestinal:  Soft, Non-tender, + BS	  Skin: No rashes, No ecchymoses, No cyanosis  Extremities: Normal range of motion, No clubbing, cyanosis or edema  Vascular: Peripheral pulses palpable 2+ bilaterally  Neurologic: Non-focal  Psychiatry: A & O x 3, Mood & affect appropriate    LABS:	 	             9.8    9.33  )-----------( 195      ( 09 May 2021 07:09 )             33.5     144  |  106  |  54<H>  ----------------------------<  122<H>  4.5   |  32<H>  |  2.77<H>    Ca    9.1      09 May 2021 07:09  Phos  3.6     05-09  Mg     2.1     05-09  PT/INR - ( 09 May 2021 07:09 )   PT: 12.4 sec;   INR: 1.04      PTT - ( 09 May 2021 07:09 )  PTT:90.4 sec

## 2021-05-09 NOTE — PROGRESS NOTE ADULT - PROBLEM SELECTOR PLAN 6
presented with new onset Afib, s/p self conversion to NSR  -Continue Carvedilol 25mg BID and Heparin gtt  -Transition to Eliquis post cath

## 2021-05-10 LAB
ANION GAP SERPL CALC-SCNC: 9 MMOL/L — SIGNIFICANT CHANGE UP (ref 5–17)
APTT BLD: 30.9 SEC — SIGNIFICANT CHANGE UP (ref 27.5–35.5)
APTT BLD: 56 SEC — HIGH (ref 27.5–35.5)
APTT BLD: 62.8 SEC — HIGH (ref 27.5–35.5)
BUN SERPL-MCNC: 43 MG/DL — HIGH (ref 7–23)
CALCIUM SERPL-MCNC: 9.1 MG/DL — SIGNIFICANT CHANGE UP (ref 8.4–10.5)
CHLORIDE SERPL-SCNC: 104 MMOL/L — SIGNIFICANT CHANGE UP (ref 96–108)
CO2 SERPL-SCNC: 27 MMOL/L — SIGNIFICANT CHANGE UP (ref 22–31)
CREAT SERPL-MCNC: 2.5 MG/DL — HIGH (ref 0.5–1.3)
CULTURE RESULTS: SIGNIFICANT CHANGE UP
CULTURE RESULTS: SIGNIFICANT CHANGE UP
GLUCOSE SERPL-MCNC: 116 MG/DL — HIGH (ref 70–99)
HCT VFR BLD CALC: 32.8 % — LOW (ref 39–50)
HGB BLD-MCNC: 9.7 G/DL — LOW (ref 13–17)
MAGNESIUM SERPL-MCNC: 2 MG/DL — SIGNIFICANT CHANGE UP (ref 1.6–2.6)
MCHC RBC-ENTMCNC: 27.2 PG — SIGNIFICANT CHANGE UP (ref 27–34)
MCHC RBC-ENTMCNC: 29.6 GM/DL — LOW (ref 32–36)
MCV RBC AUTO: 91.9 FL — SIGNIFICANT CHANGE UP (ref 80–100)
NRBC # BLD: 0 /100 WBCS — SIGNIFICANT CHANGE UP (ref 0–0)
PHOSPHATE SERPL-MCNC: 2.8 MG/DL — SIGNIFICANT CHANGE UP (ref 2.5–4.5)
PLATELET # BLD AUTO: 179 K/UL — SIGNIFICANT CHANGE UP (ref 150–400)
POTASSIUM SERPL-MCNC: 4.6 MMOL/L — SIGNIFICANT CHANGE UP (ref 3.5–5.3)
POTASSIUM SERPL-SCNC: 4.6 MMOL/L — SIGNIFICANT CHANGE UP (ref 3.5–5.3)
PROCALCITONIN SERPL-MCNC: 0.74 NG/ML — HIGH (ref 0.02–0.1)
RBC # BLD: 3.57 M/UL — LOW (ref 4.2–5.8)
RBC # FLD: 13.9 % — SIGNIFICANT CHANGE UP (ref 10.3–14.5)
SODIUM SERPL-SCNC: 140 MMOL/L — SIGNIFICANT CHANGE UP (ref 135–145)
SPECIMEN SOURCE: SIGNIFICANT CHANGE UP
SPECIMEN SOURCE: SIGNIFICANT CHANGE UP
WBC # BLD: 9.24 K/UL — SIGNIFICANT CHANGE UP (ref 3.8–10.5)
WBC # FLD AUTO: 9.24 K/UL — SIGNIFICANT CHANGE UP (ref 3.8–10.5)

## 2021-05-10 PROCEDURE — 99233 SBSQ HOSP IP/OBS HIGH 50: CPT

## 2021-05-10 PROCEDURE — 93458 L HRT ARTERY/VENTRICLE ANGIO: CPT | Mod: 26

## 2021-05-10 PROCEDURE — 99152 MOD SED SAME PHYS/QHP 5/>YRS: CPT

## 2021-05-10 PROCEDURE — 93010 ELECTROCARDIOGRAM REPORT: CPT

## 2021-05-10 RX ORDER — ISOSORBIDE MONONITRATE 60 MG/1
60 TABLET, EXTENDED RELEASE ORAL DAILY
Refills: 0 | Status: DISCONTINUED | OUTPATIENT
Start: 2021-05-10 | End: 2021-05-12

## 2021-05-10 RX ORDER — ACETAMINOPHEN 500 MG
650 TABLET ORAL ONCE
Refills: 0 | Status: COMPLETED | OUTPATIENT
Start: 2021-05-10 | End: 2021-05-10

## 2021-05-10 RX ADMIN — CARVEDILOL PHOSPHATE 25 MILLIGRAM(S): 80 CAPSULE, EXTENDED RELEASE ORAL at 05:50

## 2021-05-10 RX ADMIN — CLOPIDOGREL BISULFATE 75 MILLIGRAM(S): 75 TABLET, FILM COATED ORAL at 08:22

## 2021-05-10 RX ADMIN — Medication 100 MILLIGRAM(S): at 13:04

## 2021-05-10 RX ADMIN — Medication 100 MILLIGRAM(S): at 05:50

## 2021-05-10 RX ADMIN — HEPARIN SODIUM 1600 UNIT(S)/HR: 5000 INJECTION INTRAVENOUS; SUBCUTANEOUS at 07:32

## 2021-05-10 RX ADMIN — ISOSORBIDE MONONITRATE 60 MILLIGRAM(S): 60 TABLET, EXTENDED RELEASE ORAL at 13:03

## 2021-05-10 RX ADMIN — ATORVASTATIN CALCIUM 80 MILLIGRAM(S): 80 TABLET, FILM COATED ORAL at 22:41

## 2021-05-10 RX ADMIN — HEPARIN SODIUM 2000 UNIT(S)/HR: 5000 INJECTION INTRAVENOUS; SUBCUTANEOUS at 15:34

## 2021-05-10 RX ADMIN — AMLODIPINE BESYLATE 10 MILLIGRAM(S): 2.5 TABLET ORAL at 05:49

## 2021-05-10 RX ADMIN — Medication 100 MILLIGRAM(S): at 05:49

## 2021-05-10 RX ADMIN — BUDESONIDE AND FORMOTEROL FUMARATE DIHYDRATE 2 PUFF(S): 160; 4.5 AEROSOL RESPIRATORY (INHALATION) at 22:37

## 2021-05-10 RX ADMIN — CARVEDILOL PHOSPHATE 25 MILLIGRAM(S): 80 CAPSULE, EXTENDED RELEASE ORAL at 17:16

## 2021-05-10 RX ADMIN — Medication 650 MILLIGRAM(S): at 06:52

## 2021-05-10 RX ADMIN — Medication 2: at 17:17

## 2021-05-10 RX ADMIN — HEPARIN SODIUM 2000 UNIT(S)/HR: 5000 INJECTION INTRAVENOUS; SUBCUTANEOUS at 22:39

## 2021-05-10 RX ADMIN — TAMSULOSIN HYDROCHLORIDE 0.4 MILLIGRAM(S): 0.4 CAPSULE ORAL at 22:41

## 2021-05-10 RX ADMIN — Medication 81 MILLIGRAM(S): at 08:21

## 2021-05-10 RX ADMIN — Medication 100 MILLIGRAM(S): at 17:16

## 2021-05-10 RX ADMIN — Medication 200 MILLIGRAM(S): at 01:05

## 2021-05-10 NOTE — DIETITIAN INITIAL EVALUATION ADULT. - PROBLEM SELECTOR PLAN 1
-- Presented w/ HTN emergency /131 w/ acute respiratory failure 2/2 flash pulmonary edema likely due to HTN.   -- Pulmonary edema - pt received Lasix 40 IV x1 and placed on BiPAP in ER; Now on high flow nasal cannula.   -- Started on Nitro gtt in the ER.   -- SBP goal = 160.   -- CONT: home Coreg 25mg PO BID, Hydralazine 100mg PO TID, and Nitro gtt at 45mcg/min.   -- PLAN: continue to monitor BP, and reintroduce home regimen as appropriate.

## 2021-05-10 NOTE — PROGRESS NOTE ADULT - PROBLEM SELECTOR PLAN 9
-Continue Tamsulosin 0.4mg QD    F: None  E: Replete if K<4 or Mag<2  N: DASH Diet with 1L fluid restriction  VTEppx: Heparin gtt  Dispo: pending cath today -Continue Tamsulosin 0.4mg QD    F: None  E: Replete if K<4 or Mag<2  N: DASH Diet with 1L fluid restriction  VTEppx: Heparin gtt  Dispo: pending Cardiac MRI and CTS recs

## 2021-05-10 NOTE — PROGRESS NOTE ADULT - PROBLEM SELECTOR PLAN 6
presented with new onset Afib, s/p self conversion to NSR  -Continue Carvedilol 25mg BID and Heparin gtt  -Transition to Eliquis post cath presented with new onset Afib, s/p self conversion to NSR  -Continue Carvedilol 25mg BID and Heparin gtt  -Transition to Eliquis if no CTS intervention

## 2021-05-10 NOTE — PROGRESS NOTE ADULT - SUBJECTIVE AND OBJECTIVE BOX
Cardiology PA Adult Progress Note    Subjective Assessment:  	  MEDICATIONS:  amLODIPine   Tablet 10 milliGRAM(s) Oral daily  carvedilol 25 milliGRAM(s) Oral every 12 hours  hydrALAZINE 100 milliGRAM(s) Oral every 8 hours  isosorbide   mononitrate ER Tablet (IMDUR) 30 milliGRAM(s) Oral daily  tamsulosin 0.4 milliGRAM(s) Oral at bedtime  cefTRIAXone Injectable. 1000 milliGRAM(s) IV Push every 24 hours  budesonide  80 MICROgram(s)/formoterol 4.5 MICROgram(s) Inhaler 2 Puff(s) Inhalation two times a day  guaiFENesin Oral Liquid (Sugar-Free) 200 milliGRAM(s) Oral every 6 hours PRN  allopurinol 100 milliGRAM(s) Oral two times a day  atorvastatin 80 milliGRAM(s) Oral at bedtime  insulin lispro (ADMELOG) corrective regimen sliding scale   SubCutaneous Before meals and at bedtime  aspirin  chewable 81 milliGRAM(s) Oral daily  clopidogrel Tablet 75 milliGRAM(s) Oral daily  heparin  Infusion.  Unit(s)/Hr IV Continuous <Continuous>    VITAL SIGNS:  T(C): 36.7 (05-10-21 @ 05:55), Max: 36.8 (05-09-21 @ 10:36)  HR: 70 (05-10-21 @ 06:53) (70 - 84)  BP: 153/75 (05-10-21 @ 06:53) (152/73 - 194/93)  RR: 18 (05-10-21 @ 06:53) (16 - 18)  SpO2: 93% (05-10-21 @ 06:53) (93% - 96%)    I&O's Summary  09 May 2021 07:01  -  10 May 2021 07:00  --------------------------------------------------------  IN: 840 mL / OUT: 1975 mL / NET: -1135 mL                                  PHYSICAL EXAM:  Appearance: Normal	  HEENT: Normal oral mucosa, PERRL, EOMI	  Neck: Supple, - JVD; No Carotid Bruit   Cardiovascular: Normal S1 S2, No JVD, No murmurs,   Respiratory: Lungs clear to auscultation, No Rales, Rhonchi, Wheezing	  Gastrointestinal:  Soft, Non-tender, + BS	  Skin: No rashes, No ecchymoses, No cyanosis  Extremities: Normal range of motion, No clubbing, cyanosis or edema  Vascular: Peripheral pulses palpable 2+ bilaterally  Neurologic: Non-focal  Psychiatry: A & O x 3, Mood & affect appropriate    LABS:	 	             9.7    9.24  )-----------( 179      ( 10 May 2021 06:57 )             32.8     140  |  104  |  43<H>  ----------------------------<  116<H>  4.6   |  27  |  2.50<H>    Ca    9.1      10 May 2021 06:57  Phos  2.8     05-10  Mg     2.0     05-10  PT/INR - ( 09 May 2021 07:09 )   PT: 12.4 sec;   INR: 1.04      PTT - ( 10 May 2021 06:57 )  PTT:56.0 sec   Cardiology PA Adult Progress Note    Subjective Assessment: Pt seen and examined at bedside this AM without any complaints or events overnight. He states that he feels well and denies any CP, palpitations, dizziness/ligtheadedness, HA, SOB or orthopnea.  	  MEDICATIONS:  amLODIPine   Tablet 10 milliGRAM(s) Oral daily  carvedilol 25 milliGRAM(s) Oral every 12 hours  hydrALAZINE 100 milliGRAM(s) Oral every 8 hours  isosorbide   mononitrate ER Tablet (IMDUR) 30 milliGRAM(s) Oral daily  tamsulosin 0.4 milliGRAM(s) Oral at bedtime  cefTRIAXone Injectable. 1000 milliGRAM(s) IV Push every 24 hours  budesonide  80 MICROgram(s)/formoterol 4.5 MICROgram(s) Inhaler 2 Puff(s) Inhalation two times a day  guaiFENesin Oral Liquid (Sugar-Free) 200 milliGRAM(s) Oral every 6 hours PRN  allopurinol 100 milliGRAM(s) Oral two times a day  atorvastatin 80 milliGRAM(s) Oral at bedtime  insulin lispro (ADMELOG) corrective regimen sliding scale   SubCutaneous Before meals and at bedtime  aspirin  chewable 81 milliGRAM(s) Oral daily  clopidogrel Tablet 75 milliGRAM(s) Oral daily  heparin  Infusion.  Unit(s)/Hr IV Continuous <Continuous>    VITAL SIGNS:  T(C): 36.7 (05-10-21 @ 05:55), Max: 36.8 (05-09-21 @ 10:36)  HR: 70 (05-10-21 @ 06:53) (70 - 84)  BP: 153/75 (05-10-21 @ 06:53) (152/73 - 194/93)  RR: 18 (05-10-21 @ 06:53) (16 - 18)  SpO2: 93% (05-10-21 @ 06:53) (93% - 96%)    I&O's Summary  09 May 2021 07:01  -  10 May 2021 07:00  --------------------------------------------------------  IN: 840 mL / OUT: 1975 mL / NET: -1135 mL                                  PHYSICAL EXAM:  Appearance: Normal	  HEENT: Normal oral mucosa, PERRL, EOMI	  Neck: Supple, - JVD; No Carotid Bruit   Cardiovascular: Normal S1 S2, No JVD, No murmurs,   Respiratory: Lungs clear to auscultation, No Rales, Rhonchi, Wheezing	  Gastrointestinal:  Soft, Non-tender, + BS	  Skin: No rashes, No ecchymoses, No cyanosis  Extremities: Normal range of motion, No clubbing, cyanosis or edema  Vascular: Peripheral pulses palpable 2+ bilaterally  Neurologic: Non-focal  Psychiatry: A & O x 3, Mood & affect appropriate    LABS:	 	             9.7    9.24  )-----------( 179      ( 10 May 2021 06:57 )             32.8     140  |  104  |  43<H>  ----------------------------<  116<H>  4.6   |  27  |  2.50<H>    Ca    9.1      10 May 2021 06:57  Phos  2.8     05-10  Mg     2.0     05-10  PT/INR - ( 09 May 2021 07:09 )   PT: 12.4 sec;   INR: 1.04      PTT - ( 10 May 2021 06:57 )  PTT:56.0 sec

## 2021-05-10 NOTE — DIETITIAN INITIAL EVALUATION ADULT. - OTHER CALCULATIONS
IBW used to calculate energy needs due to pt's current body weight exceeding 120% of IBW, adjusted for age and CHF - fluids per team

## 2021-05-10 NOTE — PROGRESS NOTE ADULT - ASSESSMENT
69yo non-compliant Male, former smoker, with PMHx of uncontrolled HTN, HLD, DM-II, CAD, PAD (s/p stents in L leg, per pt), sCHF (EF 25-30%), COPD (on home O2 PRN) and CKD (baseline Cr 3) who presented c/o SOB, found to be in acute hypoxic respiratory failure requiring BIPAP in setting of HTN emergency with flash pulm edema, 2/2 medication non compliance. Pt R/I NSTEMI, peak Trop 0.16 with TWI in inferolaterals leads, plan for cardiac cath today 5/10/21. Pt also found to be in new Afib, now s/p self conversion to NSR. Hospital course significant for uptrending leukocytosis and currently on IV abx for suspected CAP. 71yo non-compliant Male, former smoker, with PMHx of uncontrolled HTN, HLD, DM-II, CAD, PAD (s/p stents in L leg, per pt), sCHF (EF 25-30%), COPD (on home O2 PRN) and CKD (baseline Cr 3) who presented c/o SOB, found to be in acute hypoxic respiratory failure requiring BIPAP in setting of HTN emergency with flash pulm edema, 2/2 medication non compliance. Pt R/I NSTEMI, peak Trop 0.16 with TWI in inferolaterals leads and found to be in new Afib, now s/p self conversion to NSR. Hospital course significant for uptrending leukocytosis and currently on IV abx for suspected CAP. Pt now s/p cardiac cath revealing 3vCAD, CTS consulted. 69yo non-compliant Male, former smoker, with PMHx of uncontrolled HTN, HLD, DM-II, CAD, PAD (s/p stents in L leg, per pt), sCHF (EF 25-30%), COPD (on home O2 PRN) and CKD (baseline Cr 3) who presented c/o SOB, found to be in acute hypoxic respiratory failure requiring BIPAP in setting of HTN emergency with flash pulm edema, 2/2 medication non compliance. Pt R/I NSTEMI with peak Trop 0.16 and TWI in inferolaterals leads and found to be in new Afib, now s/p self conversion to NSR. Hospital course significant for uptrending leukocytosis and currently on IV abx for suspected CAP. Pt now euvolemic, off diuretics and s/p cardiac cath revealing 3vCAD, CTS consulted. 71yo non-compliant Male, former smoker, with PMHx of uncontrolled HTN, HLD, DM-II, CAD, PAD (s/p stents in L leg, per pt), sCHF (EF 25-30%), COPD (on home O2 PRN) and CKD (baseline Cr 3) who presented c/o SOB, found to be in acute hypoxic respiratory failure requiring BIPAP in setting of HTN emergency with flash pulm edema, 2/2 medication non compliance. Pt R/I NSTEMI with peak Trop 0.16 and TWI in inferolaterals leads and found to be in new Afib, now s/p self conversion to NSR. Hospital course significant for uptrending leukocytosis and currently on IV abx for suspected CAP. Pt now euvolemic, off diuretics and s/p cardiac cath revealing 3vCAD, CTS consulted, pending cardiac MRI.

## 2021-05-10 NOTE — PROGRESS NOTE ADULT - PROBLEM SELECTOR PLAN 5
non oliguric CKD IV, baseline Cr 3; Renal following  -Cr peak at 3.3, now downtrending to 2.50 today  -UA with protein and blood, Urine Lytes WNL  -RP US prelim read revealed B/L increased renal echogenicity consistent with medical renal disease  -Hold home Losartan  -Renal dose meds, avoid nephrotoxic agents and monitor UOP non oliguric CKD IV, baseline Cr 3; Renal following  -Cr peak at 3.3, now downtrending to 2.5 today  -UA with protein and blood, Urine Lytes WNL  -RP US prelim read revealed B/L increased renal echogenicity consistent with medical renal disease  -Hold home Losartan  -Renal dose meds, avoid nephrotoxic agents and monitor UOP non oliguric CKD IV, baseline Cr 3; Renal following  -Cr peak at 3.3, continues to downtrending to 2.5 today  -UA with protein and blood, Urine Lytes WNL  -RP US prelim read revealed B/L increased renal echogenicity consistent with medical renal disease  -Hold home Losartan  -Renal dose meds, avoid nephrotoxic agents and monitor UOP

## 2021-05-10 NOTE — PROGRESS NOTE ADULT - PROBLEM SELECTOR PLAN 2
euvolemic on exam, SpO2 97% on 2L NC and net neg 10L overall  -BNP 3K, CXR with B/L opacities/right pleural effusion  -Echo 5/5/21: EF 25-30%, with the exception of basal and mid anteroseptum, the remaining segments are mod-severely hypokinetic, borderline dilated LA, aortic root mildly dilated (4cm)  -s/p Lasix IV, holding diuresis in anticipation of Cath today  -Continue Carvedilol 25mg BID and Amlodipine 10mg QD. Holding home Losartan 2/2 elevated Cr  -Core Measures, daily weight, strict I&Os with 1L fluid restriction and condom cath euvolemic on exam, SpO2 95% RA and net neg 10L overall  -BNP 3K, CXR with B/L opacities/right pleural effusion  -Echo 5/5/21: EF 25-30%, with the exception of basal and mid anteroseptum, the remaining segments are mod-severely hypokinetic, borderline dilated LA, aortic root mildly dilated (4cm)  -s/p Lasix IV, holding diuresis in anticipation of Cath today  -Continue Carvedilol 25mg BID and Amlodipine 10mg QD. Holding home Losartan 2/2 elevated Cr  -Core Measures, daily weight, strict I&Os with 1L fluid restriction and condom cath euvolemic on exam, SpO2 95% RA and net neg 10L overall  -BNP 3K, CXR with B/L opacities/right pleural effusion  -Echo 5/5/21: EF 25-30%, with the exception of basal and mid anteroseptum, the remaining segments are mod-severely hypokinetic, borderline dilated LA, aortic root mildly dilated (4cm)  -s/p Lasix IV, holding diuresis 2/2 cath  -Continue Carvedilol 25mg BID and Amlodipine 10mg QD. Holding home Losartan 2/2 elevated Cr  -Core Measures, daily weight, strict I&Os with 1L fluid restriction and condom cath

## 2021-05-10 NOTE — PROGRESS NOTE ADULT - PROBLEM SELECTOR PLAN 4
presented in acute hypoxic respiratory failure requiring BIPAP > HFNC, endorsing productive cough with white sputum, WBC 17.94 with left shift and uptrending procal to 3.92  -pt has remained afebrile, WBC has downtrended to normal and satting 98% on 2L NC  -CXR with B/L opacities/right pleural effusion  -Blood Cx NGTD  -Continue Ceftriaxone 1g QD x 7 days, Azithromycin x 5 days, Symbicort and Guaifenesin Q6 PRN presented in acute hypoxic respiratory failure requiring BIPAP > HFNC, endorsing productive cough with white sputum, WBC 17.94 with left shift and uptrending procal to 3.92  -pt has remained afebrile, WBC has downtrended to normal and satting 95% RA  -CXR with B/L opacities/right pleural effusion  -Blood Cx NGTD  -Continue Ceftriaxone 1g QD x 7 days, Azithromycin x 5 days, Symbicort and Guaifenesin Q6 PRN presented in acute hypoxic respiratory failure requiring BIPAP > HFNC, endorsing productive cough with white sputum, WBC 17.94 with left shift and uptrending procal to 3.92  -pt has remained afebrile, WBC has downtrended to normal and satting 95% RA  -CXR with B/L opacities/right pleural effusion  -Blood Cx NGTD  -Continue Ceftriaxone 1g QD x 7 days  -s/p completion of Azithromycin x 5 days on 5/9  -Continue Symbicort and Guaifenesin Q6 PRN

## 2021-05-10 NOTE — DIETITIAN INITIAL EVALUATION ADULT. - PERSON TAUGHT/METHOD
Low sodium diet education. Understadning stated, provide additional motivation as needed./verbal instruction/written material/patient instructed/teach back - (Patient repeats in own words)

## 2021-05-10 NOTE — DIETITIAN INITIAL EVALUATION ADULT. - RD TO REMAIN AVAILABLE
Discontinue lactase tablets and thickened liquids, OK to use thin liquids, including giving patient 8 oz liquid (including water) twice daily between meals. Please try chopped up 'dysphagia 3 type' diet, not pureed. Call or fax if need to submit as orders.   Thanks Bharath Buchanan MD   Moderate Nutrition Risk./yes

## 2021-05-10 NOTE — DIETITIAN INITIAL EVALUATION ADULT. - OTHER INFO
70yoM PMHx of HLD, DMT2, uncontrolled HTN, CAD (questionable Hx of stents?), PAD (Reporting stents in L lower extremity), HFrEF (EF 23% in 2017), COPD, CKD (unknown Cr baseline) who presented to [facility] ED on 5/5/21 in acute respiratory distress 2/2 flash pulmonary edema likely due to HTN emergency (/131 o/a); TTE (5/5/21) limited study; LVEF 25-30%. Pt R/I NSTEMI.  Hospital course significant for uptrending leukocytosis and currently on IV abx for suspected CAP. Pt now euvolemic, off diuretics and s/p cardiac cath revealing 3vCAD, CTS consulted, pending cardiac MRI.  Spoke with pt via  phone #[phone]. RN present. Reports eating well PTA. Changed diet in last 6 months which resulted in 35 pound wt loss. Pt feels good since losing wt. Reports no longer using salt or canned food. Has better portion control. Pt NPO@12 5/9. Seen with david at bedside. Chito 19. Head pain noted. 1+ BL ankle edema. No pressure ulcers. GI WDL.   Please see below for nutritions recommendations.

## 2021-05-10 NOTE — PROGRESS NOTE ADULT - PROBLEM SELECTOR PLAN 1
Presented with substernal CP, currently CP free and HD stable  -Trop peak 0.16 and downtrended to 0.14  -EKG developed TWI in inferolateral leads  -Plan for cardiac cath today with Dr. Pires, pt consented, NPO and s/p ASA 325mg and Plavix 600mg load  -Continue Heparin gtt, ASA 81mg QD, Plavix 75mg QD, Coreg 25mg BID, Atorvastatin 80mg HS Presented with substernal CP, currently CP free and HD stable  -Trop peak 0.16 and downtrended to 0.14  -EKG developed TWI in inferolateral leads  -s/p diagnostic cardiac cath 5/10 revealing 3vCAD, mRCA , mLCx , mLAD 80%, LM normal, access r groin PC and failed R radial  -CTS consulted, f/u recs  -Continue Heparin gtt, ASA 81mg QD, Coreg 25mg BID, Atorvastatin 80mg HS Presented with substernal CP, currently CP free and HD stable  -Trop peak 0.16 and downtrended to 0.14  -EKG developed TWI in inferolateral leads  -s/p diagnostic cardiac cath 5/10 revealing 3vCAD, mRCA , mLCx , mLAD 80%, LM normal, access r groin PC and failed R radial  -CTS consulted, pending Cardiac MRI for viability for further recs.  -Continue Heparin gtt, ASA 81mg QD, Coreg 25mg BID, Atorvastatin 80mg HS

## 2021-05-10 NOTE — PROGRESS NOTE ADULT - ATTENDING COMMENTS
Initial attending contact date 5/6/21     . See PA note written above for details. I reviewed the PA documentation. I have personally seen and examined this patient. I reviewed vitals, labs, medications, cardiac studies, and additional imaging. I agree with the above PA's findings and plans as written above with the following additions/statements.    -Pt is 69yo non-compliant (reports forgetting to take meds) M w/ PMHx of HLD, DM T2, uncontrolled HTN, CAD (questionable Hx of stents? pt denies but outpatient may have more info??), PAD (pt reporting stents in L lower extremity), HFrEF (EF 23% in 2017), COPD (pt w/ home O2 via NC PRN), CKD (unknown Cr baseline) admitted with HTN emergency ( due to med non compliance) in setting of acute on chronic systolic HF/NSTEMI/ new onset A fib  -HTN emergency: Initially on nitro drip, now tapered off, BP still elevated. Imdur increased to 60 mg po qd- uptitrate as needed. Cont hydralazine 100 mg po tid, coreg 25 gm po bid, amd amlodipine 10 mg po qd   -NSTEMI: with chest tightness in setting of HTN emergency with ischemic T wave inversion v4-v6. Pk trop .16 now trending down. Cont ASA, plavix, coreg, atorva 80.  -s/p CATH today: mRCA 100% occluded w/o collaterals, mCx 100% occluded, mLAD 80%. ECHO reviewed : EF 25 with severely hypokinetic walls, anteroseptum moderately hypokinetic.   -Given Cath and ECHO findings, plan for Cardiac MRI to assess for viability. CTS consulted   -CRI: Crt stable @ 2.5   -New onset A fib : now in NSR. Cont IV heparin  -Acute on chronic systolic HF: SOB improved. - 11 L since admission. Euvolemic on exam. Hold diuretics  -PT to eval

## 2021-05-10 NOTE — PROGRESS NOTE ADULT - PROBLEM SELECTOR PLAN 3
presented with /131 in the setting of medication non compliance, currently -160s  -s/p Nitro gtt  -Continue Carvedilol 25mg BID, Hydralazine 100mg TID, Imdur 30mg QD and Amlodipine 10mg QD  -Holding home Losartan 100mg for anticipated cardiac cath today  -Holding home Clonidine 0.1mg BID presented with /131 in the setting of medication non compliance, currently -160s  -s/p Nitro gtt  -Uptritrate Imdur to 60mg BID and continue Carvedilol 25mg BID, Hydralazine 100mg TID and Amlodipine 10mg QD  -Holding home Losartan 100mg for anticipated cardiac cath today  -Holding home Clonidine 0.1mg BID presented with /131 in the setting of medication non compliance, currently -160s  -s/p Nitro gtt  -Uptritrate Imdur to 60mg BID and continue Carvedilol 25mg BID, Hydralazine 100mg TID and Amlodipine 10mg QD  -Holding home Losartan 100mg for elevated Cr  -Holding home Clonidine 0.1mg BID presented with /131 in the setting of medication non compliance, currently SBP 150s  -s/p Nitro gtt  -Uptritrate Imdur to 60mg BID and continue Carvedilol 25mg BID, Hydralazine 100mg TID and Amlodipine 10mg QD  -Holding home Losartan 100mg due to elevated Cr  -Holding home Clonidine 0.1mg BID

## 2021-05-11 LAB
ANION GAP SERPL CALC-SCNC: 11 MMOL/L — SIGNIFICANT CHANGE UP (ref 5–17)
APTT BLD: 136.5 SEC — CRITICAL HIGH (ref 27.5–35.5)
APTT BLD: 167.6 SEC — CRITICAL HIGH (ref 27.5–35.5)
APTT BLD: 85.7 SEC — HIGH (ref 27.5–35.5)
BUN SERPL-MCNC: 45 MG/DL — HIGH (ref 7–23)
CALCIUM SERPL-MCNC: 9.3 MG/DL — SIGNIFICANT CHANGE UP (ref 8.4–10.5)
CHLORIDE SERPL-SCNC: 102 MMOL/L — SIGNIFICANT CHANGE UP (ref 96–108)
CO2 SERPL-SCNC: 26 MMOL/L — SIGNIFICANT CHANGE UP (ref 22–31)
CREAT SERPL-MCNC: 2.68 MG/DL — HIGH (ref 0.5–1.3)
GLUCOSE SERPL-MCNC: 100 MG/DL — HIGH (ref 70–99)
HCT VFR BLD CALC: 32.2 % — LOW (ref 39–50)
HGB BLD-MCNC: 9.8 G/DL — LOW (ref 13–17)
MAGNESIUM SERPL-MCNC: 2.1 MG/DL — SIGNIFICANT CHANGE UP (ref 1.6–2.6)
MCHC RBC-ENTMCNC: 27.5 PG — SIGNIFICANT CHANGE UP (ref 27–34)
MCHC RBC-ENTMCNC: 30.4 GM/DL — LOW (ref 32–36)
MCV RBC AUTO: 90.4 FL — SIGNIFICANT CHANGE UP (ref 80–100)
NRBC # BLD: 0 /100 WBCS — SIGNIFICANT CHANGE UP (ref 0–0)
PHOSPHATE SERPL-MCNC: 3.6 MG/DL — SIGNIFICANT CHANGE UP (ref 2.5–4.5)
PLATELET # BLD AUTO: 175 K/UL — SIGNIFICANT CHANGE UP (ref 150–400)
POTASSIUM SERPL-MCNC: 5 MMOL/L — SIGNIFICANT CHANGE UP (ref 3.5–5.3)
POTASSIUM SERPL-SCNC: 5 MMOL/L — SIGNIFICANT CHANGE UP (ref 3.5–5.3)
RBC # BLD: 3.56 M/UL — LOW (ref 4.2–5.8)
RBC # FLD: 13.8 % — SIGNIFICANT CHANGE UP (ref 10.3–14.5)
SODIUM SERPL-SCNC: 139 MMOL/L — SIGNIFICANT CHANGE UP (ref 135–145)
WBC # BLD: 10.82 K/UL — HIGH (ref 3.8–10.5)
WBC # FLD AUTO: 10.82 K/UL — HIGH (ref 3.8–10.5)

## 2021-05-11 PROCEDURE — 78491 MYOCRD IMG PET 1STD RST/STRS: CPT | Mod: 26

## 2021-05-11 PROCEDURE — 99233 SBSQ HOSP IP/OBS HIGH 50: CPT

## 2021-05-11 PROCEDURE — 93010 ELECTROCARDIOGRAM REPORT: CPT

## 2021-05-11 RX ORDER — CARVEDILOL PHOSPHATE 80 MG/1
25 CAPSULE, EXTENDED RELEASE ORAL EVERY 12 HOURS
Refills: 0 | Status: DISCONTINUED | OUTPATIENT
Start: 2021-05-11 | End: 2021-05-15

## 2021-05-11 RX ADMIN — CEFTRIAXONE 1000 MILLIGRAM(S): 500 INJECTION, POWDER, FOR SOLUTION INTRAMUSCULAR; INTRAVENOUS at 22:30

## 2021-05-11 RX ADMIN — Medication 100 MILLIGRAM(S): at 14:37

## 2021-05-11 RX ADMIN — Medication 100 MILLIGRAM(S): at 06:28

## 2021-05-11 RX ADMIN — BUDESONIDE AND FORMOTEROL FUMARATE DIHYDRATE 2 PUFF(S): 160; 4.5 AEROSOL RESPIRATORY (INHALATION) at 11:11

## 2021-05-11 RX ADMIN — CARVEDILOL PHOSPHATE 25 MILLIGRAM(S): 80 CAPSULE, EXTENDED RELEASE ORAL at 17:53

## 2021-05-11 RX ADMIN — HEPARIN SODIUM 0 UNIT(S)/HR: 5000 INJECTION INTRAVENOUS; SUBCUTANEOUS at 14:37

## 2021-05-11 RX ADMIN — HEPARIN SODIUM 1700 UNIT(S)/HR: 5000 INJECTION INTRAVENOUS; SUBCUTANEOUS at 06:27

## 2021-05-11 RX ADMIN — HEPARIN SODIUM 0 UNIT(S)/HR: 5000 INJECTION INTRAVENOUS; SUBCUTANEOUS at 05:23

## 2021-05-11 RX ADMIN — Medication 100 MILLIGRAM(S): at 21:19

## 2021-05-11 RX ADMIN — BUDESONIDE AND FORMOTEROL FUMARATE DIHYDRATE 2 PUFF(S): 160; 4.5 AEROSOL RESPIRATORY (INHALATION) at 21:20

## 2021-05-11 RX ADMIN — Medication 100 MILLIGRAM(S): at 06:29

## 2021-05-11 RX ADMIN — HEPARIN SODIUM 1400 UNIT(S)/HR: 5000 INJECTION INTRAVENOUS; SUBCUTANEOUS at 23:24

## 2021-05-11 RX ADMIN — ATORVASTATIN CALCIUM 80 MILLIGRAM(S): 80 TABLET, FILM COATED ORAL at 21:19

## 2021-05-11 RX ADMIN — CEFTRIAXONE 1000 MILLIGRAM(S): 500 INJECTION, POWDER, FOR SOLUTION INTRAMUSCULAR; INTRAVENOUS at 00:41

## 2021-05-11 RX ADMIN — AMLODIPINE BESYLATE 10 MILLIGRAM(S): 2.5 TABLET ORAL at 06:29

## 2021-05-11 RX ADMIN — Medication 100 MILLIGRAM(S): at 17:53

## 2021-05-11 RX ADMIN — ISOSORBIDE MONONITRATE 60 MILLIGRAM(S): 60 TABLET, EXTENDED RELEASE ORAL at 11:15

## 2021-05-11 RX ADMIN — TAMSULOSIN HYDROCHLORIDE 0.4 MILLIGRAM(S): 0.4 CAPSULE ORAL at 21:19

## 2021-05-11 RX ADMIN — Medication 81 MILLIGRAM(S): at 11:11

## 2021-05-11 RX ADMIN — HEPARIN SODIUM 1400 UNIT(S)/HR: 5000 INJECTION INTRAVENOUS; SUBCUTANEOUS at 15:46

## 2021-05-11 NOTE — PROGRESS NOTE ADULT - PROBLEM SELECTOR PLAN 9
-Continue Tamsulosin 0.4mg QD    F: None  E: Replete if K<4 or Mag<2  N: DASH Diet with 1L fluid restriction  VTEppx: Heparin gtt  Dispo: pending Cardiac MRI and CTS recs -Continue Tamsulosin 0.4mg QD    F: None  E: Replete if K<4 or Mag<2  N: DASH Diet with 1L fluid restriction  VTEppx: Heparin gtt  Dispo: pending Cardiac viability and CTS recs

## 2021-05-11 NOTE — PROGRESS NOTE ADULT - ATTENDING COMMENTS
71 yo man with CKD 4 admitted with hypertensive emergency, heart failure exacerbation.  s/p cardiac cath yesterday- 3V disease - non oliguric and creat 2.68- stable range  for viability study  can hold diuretic again today  HTN- BP acceptable;   Anemia- Hgb at goal

## 2021-05-11 NOTE — PROGRESS NOTE ADULT - PROBLEM SELECTOR PLAN 6
presented with new onset Afib, s/p self conversion to NSR  -Continue Carvedilol 25mg BID and Heparin gtt  -Transition to Eliquis if no CTS intervention

## 2021-05-11 NOTE — PROGRESS NOTE ADULT - ASSESSMENT
71yo non-compliant Male, former smoker, with PMHx of uncontrolled HTN, HLD, DM-II, CAD, PAD (s/p stents in L leg, per pt), sCHF (EF 25-30%), COPD (on home O2 PRN) and CKD (baseline Cr 3) who presented c/o SOB, found to be in acute hypoxic respiratory failure requiring BIPAP in setting of HTN emergency with flash pulm edema, 2/2 medication non compliance. Pt R/I NSTEMI with peak Trop 0.16 and TWI in inferolaterals leads and found to be in new Afib, now s/p self conversion to NSR. Hospital course significant for uptrending leukocytosis and currently on IV abx for suspected CAP. Pt now euvolemic, off diuretics and s/p cardiac cath revealing 3vCAD, CTS consulted, pending cardiac MRI. 69yo non-compliant Male, former smoker, with PMHx of uncontrolled HTN, HLD, DM-II, CAD, PAD (s/p stents in L leg, per pt), sCHF (EF 25-30%), COPD (on home O2 PRN) and CKD (baseline Cr 3) who presented c/o SOB, found to be in acute hypoxic respiratory failure requiring BIPAP in setting of HTN emergency with flash pulm edema, 2/2 medication non compliance. Pt R/I NSTEMI with peak Trop 0.16 and TWI in inferolaterals leads and found to be in new Afib, now s/p self conversion to NSR. Hospital course significant for uptrending leukocytosis and currently on IV abx for suspected CAP. Pt now euvolemic, off diuretics and s/p cardiac cath 5/10 revealing 3vCAD, CTS consulted, first portion of cardiac viability study done 5/11.

## 2021-05-11 NOTE — PROGRESS NOTE ADULT - PROBLEM SELECTOR PLAN 5
non oliguric CKD IV, baseline Cr 3; Renal following  -Cr peak at 3.3, continues to downtrending to 2.5 today  -UA with protein and blood, Urine Lytes WNL  -RP US prelim read revealed B/L increased renal echogenicity consistent with medical renal disease  -Hold home Losartan  -Renal dose meds, avoid nephrotoxic agents and monitor UOP non oliguric CKD IV, baseline Cr 3; Renal following  -Cr peak at 3.3, continues to downtrending to 2.68 today  -UA with protein and blood, Urine Lytes WNL  -RP US prelim read revealed B/L increased renal echogenicity consistent with medical renal disease  -Hold home Losartan  - F/u PTH  -Renal dose meds, avoid nephrotoxic agents and monitor UOP

## 2021-05-11 NOTE — PROGRESS NOTE ADULT - PROBLEM SELECTOR PLAN 4
presented in acute hypoxic respiratory failure requiring BIPAP > HFNC, endorsing productive cough with white sputum, WBC 17.94 with left shift and uptrending procal to 3.92  -pt has remained afebrile, WBC has downtrended to normal and satting 95% RA  -CXR with B/L opacities/right pleural effusion  -Blood Cx NGTD  -Continue Ceftriaxone 1g QD x 7 days  -s/p completion of Azithromycin x 5 days on 5/9  -Continue Symbicort and Guaifenesin Q6 PRN Presented in acute hypoxic respiratory failure requiring BIPAP > HFNC, endorsing productive cough with white sputum, WBC 17.94 with left shift and uptrending procal to 3.92.   -pt has remained afebrile, WBC has downtrended to normal and satting 95% RA  -CXR with B/L opacities/right pleural effusion  -Blood Cx NGTD  -Continue Ceftriaxone 1g QD x 7 days- last dose 5/12  -s/p completion of Azithromycin x 5 days on 5/9  -Continue Symbicort and Guaifenesin Q6 PRN

## 2021-05-11 NOTE — PROGRESS NOTE ADULT - SUBJECTIVE AND OBJECTIVE BOX
s/p cardiac cath 5/10 w 3vCAD  plan for nuclear viability study today, 5/11  renally stable       Meds:  allopurinol 100 two times a day  amLODIPine   Tablet 10 daily  aspirin  chewable 81 daily  atorvastatin 80 at bedtime  budesonide  80 MICROgram(s)/formoterol 4.5 MICROgram(s) Inhaler 2 two times a day  carvedilol 25 every 12 hours  cefTRIAXone Injectable. 1000 every 24 hours  dextrose 40% Gel 15 once  dextrose 5%. 1000 <Continuous>  dextrose 5%. 1000 <Continuous>  dextrose 50% Injectable 25 once  dextrose 50% Injectable 12.5 once  dextrose 50% Injectable 25 once  glucagon  Injectable 1 once  guaiFENesin Oral Liquid (Sugar-Free) 200 every 6 hours PRN  heparin  Infusion.  <Continuous>  hydrALAZINE 100 every 8 hours  insulin lispro (ADMELOG) corrective regimen sliding scale  Before meals and at bedtime  isosorbide   mononitrate ER Tablet (IMDUR) 60 daily  tamsulosin 0.4 at bedtime      T(C): , Max: 37.7 (05-11-21 @ 06:14)  T(F): , Max: 99.9 (05-11-21 @ 06:14)  HR: 80 (05-11-21 @ 08:39)  BP: 138/82 (05-11-21 @ 08:39)  BP(mean): --  RR: 18 (05-11-21 @ 08:39)  SpO2: 97% (05-11-21 @ 08:39)  Wt(kg): --    05-10 @ 07:01  -  05-11 @ 07:00  --------------------------------------------------------  IN: 560 mL / OUT: 1500 mL / NET: -940 mL    05-11 @ 07:01  -  05-11 @ 13:03  --------------------------------------------------------  IN: 120 mL / OUT: 350 mL / NET: -230 mL      REVIEW OF SYSTEMS:  Gen: No fever  CVS: No chest pain  Resp: No shortness of breath  Abd: No nausea/ No vomiting/No abdominal pain  CNS: No headache    PHYSICAL EXAM:  GENERAL: alert, no acute distress at present, on RA sitting in chair comfortably   CHEST/LUNG: decreased breath sounds bilaterally  HEART: normal S1S2, RRR  ABDOMEN: Soft  EXTREMITIES: No LE edema bilateral      LABS:                        9.8    10.82 )-----------( 175      ( 11 May 2021 07:32 )             32.2     05-11    139  |  102  |  45<H>  ----------------------------<  100<H>  5.0   |  26  |  2.68<H>    Ca    9.3      11 May 2021 07:32  Phos  3.6     05-11  Mg     2.1     05-11        PTT - ( 11 May 2021 04:43 )  PTT:167.6 sec    Calcium, Random Urine: <1.0 mg/dL (05-10 @ 11:17)        RADIOLOGY & ADDITIONAL STUDIES:

## 2021-05-11 NOTE — PROGRESS NOTE ADULT - SUBJECTIVE AND OBJECTIVE BOX
S: Pt seen and examined bedside.  Patient denies C/P, SOB, N/V, dizziness, palpitations, and diaphoresis.  Pt denies fever/chills, dysuria, abdominal pain, diarrhea, and cough  12 Point ROS otherwise negative except as per HPI/subjective.     O: Vital Signs Last 24 Hrs  T(C): 36.8 (11 May 2021 10:13), Max: 37.7 (11 May 2021 06:14)  T(F): 98.2 (11 May 2021 10:13), Max: 99.9 (11 May 2021 06:14)  HR: 80 (11 May 2021 08:39) (72 - 85)  BP: 138/82 (11 May 2021 08:39) (132/63 - 188/92)  BP(mean): --  RR: 18 (11 May 2021 08:39) (17 - 18)  SpO2: 97% (11 May 2021 08:39) (94% - 99%)    PHYSICAL EXAM:  GEN: NAD  HEENT: No JVD  PULM:  CTA B/L  CARD:  RRR, S1 and S2   ABD: +BS, NT, soft/ND	  EXT: No Edema B/L LE  NEURO: A+Ox3, no focal deficit  PSYCH: Mood Appropriate    LABS:                        9.8    10.82 )-----------( 175      ( 11 May 2021 07:32 )             32.2         139  |  102  |  45<H>  ----------------------------<  100<H>  5.0   |  26  |  2.68<H>    Ca    9.3      11 May 2021 07:32  Phos  3.6       Mg     2.1           PTT - ( 11 May 2021 04:43 )  PTT:167.6 sec  Troponin T, Serum: 0.14 ng/mL (21 @ 07:03)  Troponin T, Serum: 0.16 ng/mL (21 @ 22:39)  Troponin T, Serum: 0.14 ng/mL (21 @ 16:34)  Troponin T, Serum: 0.09 ng/mL (21 @ 11:31)      05-10 @ 07: @ 07:00  --------------------------------------------------------  IN: 560 mL / OUT: 1500 mL / NET: -940 mL     @ 07: @ 11:37  --------------------------------------------------------  IN: 120 mL / OUT: 350 mL / NET: -230 mL      Daily     Daily Weight in k.2 (11 May 2021 06:14)   S: Pt seen and examined bedside. Pt reports he is feeling well today, aware of plan for viability study today.   Patient denies C/P, SOB, N/V, dizziness, palpitations, and diaphoresis.  Pt denies fever/chills, dysuria, abdominal pain, diarrhea, and cough  12 Point ROS otherwise negative except as per HPI/subjective.     O: Vital Signs Last 24 Hrs  T(C): 36.8 (11 May 2021 10:13), Max: 37.7 (11 May 2021 06:14)  T(F): 98.2 (11 May 2021 10:13), Max: 99.9 (11 May 2021 06:14)  HR: 80 (11 May 2021 08:39) (72 - 85)  BP: 138/82 (11 May 2021 08:39) (132/63 - 188/92)  BP(mean): --  RR: 18 (11 May 2021 08:39) (17 - 18)  SpO2: 97% (11 May 2021 08:39) (94% - 99%)    PHYSICAL EXAM:  GEN: NAD  HEENT: No JVD  PULM:  CTA B/L, no WRR  CARD:  RRR, S1 and S2, no murmrus  ABD: +BS, NT, soft/ND	  EXT: No Edema B/L LE, distal pulses intact   NEURO: A+Ox3, no focal deficit  PSYCH: Mood Appropriate    LABS:                        9.8    10.82 )-----------( 175      ( 11 May 2021 07:32 )             32.2         139  |  102  |  45<H>  ----------------------------<  100<H>  5.0   |  26  |  2.68<H>    Ca    9.3      11 May 2021 07:32  Phos  3.6       Mg     2.1           PTT - ( 11 May 2021 04:43 )  PTT:167.6 sec  Troponin T, Serum: 0.14 ng/mL (21 @ 07:03)  Troponin T, Serum: 0.16 ng/mL (21 @ 22:39)  Troponin T, Serum: 0.14 ng/mL (21 @ 16:34)  Troponin T, Serum: 0.09 ng/mL (21 @ 11:31)      05-10 @ 07:01  -   @ 07:00  --------------------------------------------------------  IN: 560 mL / OUT: 1500 mL / NET: -940 mL     @ 07:  -   @ 11:37  --------------------------------------------------------  IN: 120 mL / OUT: 350 mL / NET: -230 mL      Daily     Daily Weight in k.2 (11 May 2021 06:14)

## 2021-05-11 NOTE — PROGRESS NOTE ADULT - PROBLEM SELECTOR PLAN 2
euvolemic on exam, SpO2 95% RA and net neg 10L overall  -BNP 3K, CXR with B/L opacities/right pleural effusion  -Echo 5/5/21: EF 25-30%, with the exception of basal and mid anteroseptum, the remaining segments are mod-severely hypokinetic, borderline dilated LA, aortic root mildly dilated (4cm)  -s/p Lasix IV, holding diuresis 2/2 cath  -Continue Carvedilol 25mg BID and Amlodipine 10mg QD. Holding home Losartan 2/2 elevated Cr  -Core Measures, daily weight, strict I&Os with 1L fluid restriction and condom cath Euvolemic on exam, SpO2 95% RA and net neg 12.2L overall  -BNP 3K, CXR with B/L opacities/right pleural effusion  -Echo 5/5/21: EF 25-30%, with the exception of basal and mid anteroseptum, the remaining segments are mod-severely hypokinetic, borderline dilated LA, aortic root mildly dilated (4cm)  -s/p Lasix IV, holding diuresis post cath  -Continue Carvedilol 25mg BID and Amlodipine 10mg QD. Holding home Losartan 2/2 elevated Cr  -Core Measures, daily weight, strict I&Os with 1L fluid restriction and condom cath

## 2021-05-11 NOTE — PROGRESS NOTE ADULT - ATTENDING COMMENTS
Initial attending contact date 5/6/21     . See PA note written above for details. I reviewed the PA documentation. I have personally seen and examined this patient. I reviewed vitals, labs, medications, cardiac studies, and additional imaging. I agree with the above PA's findings and plans as written above with the following additions/statements.    -Pt is 71yo non-compliant (reports forgetting to take meds) M w/ PMHx of HLD, DM T2, uncontrolled HTN, CAD (questionable Hx of stents? pt denies but outpatient may have more info??), PAD (pt reporting stents in L lower extremity), HFrEF (EF 23% in 2017), COPD (pt w/ home O2 via NC PRN), CKD (unknown Cr baseline) admitted with HTN emergency ( due to med non compliance) in setting of acute on chronic systolic HF/NSTEMI/ new onset A fib  -HTN emergency: Initially on nitro drip, now tapered off, BP now controlled on imdur 60 mg po qd, hydralazine 100 mg po tid, coreg 25 gm po bid, and amlodipine 10 mg po qd   -NSTEMI: with chest tightness in setting of HTN emergency with ischemic T wave inversion v4-v6. Pk trop .16 now trending down. Cont ASA, plavix, coreg, atorva 80.  -s/p CATH 5/10: mRCA 100% occluded w/o collaterals, mCx 100% occluded, mLAD 80%. ECHO reviewed : EF 25 with severely hypokinetic walls, anteroseptum moderately hypokinetic.   -Given Cath and ECHO findings, plan for Viability study today   -CRI: Crt stable   -New onset A fib : now in NSR. Cont IV heparin  -Acute on chronic systolic HF: SOB improved. - 12 L since admission. Euvolemic on exam. Hold diuretics  -Dispo pending CTS eval pending viability study  -PT eval- no skilled needs

## 2021-05-11 NOTE — PROGRESS NOTE ADULT - ASSESSMENT
Nephrology consulted for CKD stage 4 in 71yo M who admitted with hypertensive emergency, heart failure exacerbation with plan for cardiac cath once volume status optimized on Monday     # Nonoliguric CKD stage 4 (eGFR 15-30 ml/min)  - baseline sCr in 3's, stable at baseline  - furosemide held prior to cardiac cath on 5/11 and today as pt is NPO for nuclear viability study- appears euvolemic  - renally adjusted meds/ ABx (CrCl <30 ml/min)  - daily weight/ strict in/outs as per CHF exacerbation/ renal diet  HTN- c/w home meds, hold ACE/ARBs  Anemia- Hb at goal, iron panel noted, no need for iron suppl   CKD-MBD- ca, phos, Vit D noted- please check PTH

## 2021-05-11 NOTE — PROGRESS NOTE ADULT - PROBLEM SELECTOR PLAN 1
Presented with substernal CP, currently CP free and HD stable  -Trop peak 0.16 and downtrended to 0.14  -EKG developed TWI in inferolateral leads  -s/p diagnostic cardiac cath 5/10 revealing 3vCAD, mRCA , mLCx , mLAD 80%, LM normal, access r groin PC and failed R radial  -CTS consulted, pending Cardiac MRI for viability for further recs.  -Continue Heparin gtt, ASA 81mg QD, Coreg 25mg BID, Atorvastatin 80mg HS Presented with substernal CP, currently CP free and HD stable  -Trop peak 0.16 and downtrended to 0.14  -EKG developed TWI in inferolateral leads  -s/p diagnostic cardiac cath 5/10 revealing 3vCAD, mRCA , mLCx , mLAD 80%, LM normal, access r groin PC and failed R radial  -CTS consulted, pending second portion of cardiac viability scan on 5/12  -Continue Heparin gtt, ASA 81mg QD, Coreg 25mg BID, Atorvastatin 80mg HS

## 2021-05-12 ENCOUNTER — TRANSCRIPTION ENCOUNTER (OUTPATIENT)
Age: 71
End: 2021-05-12

## 2021-05-12 LAB
ANION GAP SERPL CALC-SCNC: 11 MMOL/L — SIGNIFICANT CHANGE UP (ref 5–17)
APTT BLD: 107.9 SEC — HIGH (ref 27.5–35.5)
APTT BLD: 96.6 SEC — HIGH (ref 27.5–35.5)
BASOPHILS # BLD AUTO: 0.06 K/UL — SIGNIFICANT CHANGE UP (ref 0–0.2)
BASOPHILS NFR BLD AUTO: 0.7 % — SIGNIFICANT CHANGE UP (ref 0–2)
BUN SERPL-MCNC: 41 MG/DL — HIGH (ref 7–23)
CALCIUM SERPL-MCNC: 8.8 MG/DL — SIGNIFICANT CHANGE UP (ref 8.4–10.5)
CHLORIDE SERPL-SCNC: 104 MMOL/L — SIGNIFICANT CHANGE UP (ref 96–108)
CO2 SERPL-SCNC: 27 MMOL/L — SIGNIFICANT CHANGE UP (ref 22–31)
CREAT SERPL-MCNC: 2.72 MG/DL — HIGH (ref 0.5–1.3)
EOSINOPHIL # BLD AUTO: 0.52 K/UL — HIGH (ref 0–0.5)
EOSINOPHIL NFR BLD AUTO: 5.7 % — SIGNIFICANT CHANGE UP (ref 0–6)
GLUCOSE SERPL-MCNC: 112 MG/DL — HIGH (ref 70–99)
HCT VFR BLD CALC: 29.9 % — LOW (ref 39–50)
HCT VFR BLD CALC: 30.2 % — LOW (ref 39–50)
HGB BLD-MCNC: 9.1 G/DL — LOW (ref 13–17)
HGB BLD-MCNC: 9.2 G/DL — LOW (ref 13–17)
IMM GRANULOCYTES NFR BLD AUTO: 0.8 % — SIGNIFICANT CHANGE UP (ref 0–1.5)
LYMPHOCYTES # BLD AUTO: 1.76 K/UL — SIGNIFICANT CHANGE UP (ref 1–3.3)
LYMPHOCYTES # BLD AUTO: 19.3 % — SIGNIFICANT CHANGE UP (ref 13–44)
MAGNESIUM SERPL-MCNC: 2.2 MG/DL — SIGNIFICANT CHANGE UP (ref 1.6–2.6)
MCHC RBC-ENTMCNC: 27.4 PG — SIGNIFICANT CHANGE UP (ref 27–34)
MCHC RBC-ENTMCNC: 28.1 PG — SIGNIFICANT CHANGE UP (ref 27–34)
MCHC RBC-ENTMCNC: 30.1 GM/DL — LOW (ref 32–36)
MCHC RBC-ENTMCNC: 30.8 GM/DL — LOW (ref 32–36)
MCV RBC AUTO: 91 FL — SIGNIFICANT CHANGE UP (ref 80–100)
MCV RBC AUTO: 91.4 FL — SIGNIFICANT CHANGE UP (ref 80–100)
MONOCYTES # BLD AUTO: 0.72 K/UL — SIGNIFICANT CHANGE UP (ref 0–0.9)
MONOCYTES NFR BLD AUTO: 7.9 % — SIGNIFICANT CHANGE UP (ref 2–14)
NEUTROPHILS # BLD AUTO: 5.98 K/UL — SIGNIFICANT CHANGE UP (ref 1.8–7.4)
NEUTROPHILS NFR BLD AUTO: 65.6 % — SIGNIFICANT CHANGE UP (ref 43–77)
NRBC # BLD: 0 /100 WBCS — SIGNIFICANT CHANGE UP (ref 0–0)
NRBC # BLD: 0 /100 WBCS — SIGNIFICANT CHANGE UP (ref 0–0)
PLATELET # BLD AUTO: 168 K/UL — SIGNIFICANT CHANGE UP (ref 150–400)
PLATELET # BLD AUTO: 174 K/UL — SIGNIFICANT CHANGE UP (ref 150–400)
POTASSIUM SERPL-MCNC: 4.5 MMOL/L — SIGNIFICANT CHANGE UP (ref 3.5–5.3)
POTASSIUM SERPL-SCNC: 4.5 MMOL/L — SIGNIFICANT CHANGE UP (ref 3.5–5.3)
RBC # BLD: 3.27 M/UL — LOW (ref 4.2–5.8)
RBC # BLD: 3.32 M/UL — LOW (ref 4.2–5.8)
RBC # FLD: 14 % — SIGNIFICANT CHANGE UP (ref 10.3–14.5)
RBC # FLD: 14.1 % — SIGNIFICANT CHANGE UP (ref 10.3–14.5)
SODIUM SERPL-SCNC: 142 MMOL/L — SIGNIFICANT CHANGE UP (ref 135–145)
WBC # BLD: 9.11 K/UL — SIGNIFICANT CHANGE UP (ref 3.8–10.5)
WBC # BLD: 9.57 K/UL — SIGNIFICANT CHANGE UP (ref 3.8–10.5)
WBC # FLD AUTO: 9.11 K/UL — SIGNIFICANT CHANGE UP (ref 3.8–10.5)
WBC # FLD AUTO: 9.57 K/UL — SIGNIFICANT CHANGE UP (ref 3.8–10.5)

## 2021-05-12 PROCEDURE — 99233 SBSQ HOSP IP/OBS HIGH 50: CPT

## 2021-05-12 RX ORDER — HEPARIN SODIUM 5000 [USP'U]/ML
5300 INJECTION INTRAVENOUS; SUBCUTANEOUS EVERY 6 HOURS
Refills: 0 | Status: DISCONTINUED | OUTPATIENT
Start: 2021-05-12 | End: 2021-05-13

## 2021-05-12 RX ORDER — CHLORHEXIDINE GLUCONATE 213 G/1000ML
1 SOLUTION TOPICAL ONCE
Refills: 0 | Status: DISCONTINUED | OUTPATIENT
Start: 2021-05-12 | End: 2021-05-14

## 2021-05-12 RX ORDER — HEPARIN SODIUM 5000 [USP'U]/ML
1600 INJECTION INTRAVENOUS; SUBCUTANEOUS
Qty: 25000 | Refills: 0 | Status: DISCONTINUED | OUTPATIENT
Start: 2021-05-12 | End: 2021-05-13

## 2021-05-12 RX ORDER — ISOSORBIDE MONONITRATE 60 MG/1
90 TABLET, EXTENDED RELEASE ORAL DAILY
Refills: 0 | Status: DISCONTINUED | OUTPATIENT
Start: 2021-05-12 | End: 2021-05-14

## 2021-05-12 RX ADMIN — CARVEDILOL PHOSPHATE 25 MILLIGRAM(S): 80 CAPSULE, EXTENDED RELEASE ORAL at 17:33

## 2021-05-12 RX ADMIN — HEPARIN SODIUM 0 UNIT(S)/HR: 5000 INJECTION INTRAVENOUS; SUBCUTANEOUS at 19:08

## 2021-05-12 RX ADMIN — BUDESONIDE AND FORMOTEROL FUMARATE DIHYDRATE 2 PUFF(S): 160; 4.5 AEROSOL RESPIRATORY (INHALATION) at 11:51

## 2021-05-12 RX ADMIN — HEPARIN SODIUM 1400 UNIT(S)/HR: 5000 INJECTION INTRAVENOUS; SUBCUTANEOUS at 06:23

## 2021-05-12 RX ADMIN — Medication 100 MILLIGRAM(S): at 15:40

## 2021-05-12 RX ADMIN — Medication 100 MILLIGRAM(S): at 17:32

## 2021-05-12 RX ADMIN — Medication 100 MILLIGRAM(S): at 22:10

## 2021-05-12 RX ADMIN — Medication 2: at 22:01

## 2021-05-12 RX ADMIN — CARVEDILOL PHOSPHATE 25 MILLIGRAM(S): 80 CAPSULE, EXTENDED RELEASE ORAL at 05:29

## 2021-05-12 RX ADMIN — ATORVASTATIN CALCIUM 80 MILLIGRAM(S): 80 TABLET, FILM COATED ORAL at 22:10

## 2021-05-12 RX ADMIN — TAMSULOSIN HYDROCHLORIDE 0.4 MILLIGRAM(S): 0.4 CAPSULE ORAL at 22:10

## 2021-05-12 RX ADMIN — Medication 100 MILLIGRAM(S): at 05:29

## 2021-05-12 RX ADMIN — HEPARIN SODIUM 1350 UNIT(S)/HR: 5000 INJECTION INTRAVENOUS; SUBCUTANEOUS at 20:09

## 2021-05-12 RX ADMIN — Medication 81 MILLIGRAM(S): at 11:51

## 2021-05-12 RX ADMIN — HEPARIN SODIUM 1600 UNIT(S)/HR: 5000 INJECTION INTRAVENOUS; SUBCUTANEOUS at 11:51

## 2021-05-12 RX ADMIN — ISOSORBIDE MONONITRATE 90 MILLIGRAM(S): 60 TABLET, EXTENDED RELEASE ORAL at 11:51

## 2021-05-12 RX ADMIN — AMLODIPINE BESYLATE 10 MILLIGRAM(S): 2.5 TABLET ORAL at 05:29

## 2021-05-12 NOTE — PROGRESS NOTE ADULT - PROBLEM SELECTOR PLAN 4
Presented in acute hypoxic respiratory failure requiring BIPAP > HFNC, endorsing productive cough with white sputum, WBC 17.94 with left shift and uptrending procal to 3.92.   -pt has remained afebrile, WBC has downtrended to normal and satting 95% RA  -CXR with B/L opacities/right pleural effusion  -Blood Cx NGTD  -Continue Ceftriaxone 1g QD x 7 days- last dose 5/12  -s/p completion of Azithromycin x 5 days on 5/9  -Continue Symbicort and Guaifenesin Q6 PRN Presented in acute hypoxic respiratory failure requiring BIPAP > HFNC, endorsing productive cough with white sputum, WBC 17.94 with left shift and uptrending procal to 3.92.   -pt has remained afebrile, WBC has downtrended to normal and satting 95% RA  -CXR with B/L opacities/right pleural effusion  -Blood Cx NGTD  -s/p completion of Azithromycin x 5 days on 5/9 and IV Ceftriaxone 1G x 7 days on 5/12  -Continue Symbicort and Guaifenesin Q6 PRN

## 2021-05-12 NOTE — DISCHARGE NOTE PROVIDER - HOSPITAL COURSE
INCOMPLETE    69yo non-compliant Male, former smoker, with PMHx of uncontrolled HTN, HLD, DM-II, CAD, PAD (s/p stents in L leg, per pt), sCHF (EF 25-30%), COPD (on home O2 PRN) and CKD (baseline Cr 3) who presented c/o SOB, found to be in acute hypoxic respiratory failure requiring BIPAP in setting of HTN emergency with flash pulm edema, 2/2 medication non compliance. S/p IV diuresis, discontinued when euvolemic. Hospital course significant for uptrending leukocytosis and completed IV abx for suspected CAP on 5/12. Pt R/I NSTEMI with peak Trop 0.16 and TWI in inferolaterals leads and found to be in new Afib, now s/p self conversion to NSR. S/p cardiac cath 5/10 revealing mRCA , mLCx , mLAD 80%, LM normal, access r groin PC and failed R radial. Cardiac viability scan revealed small area of mild reversibility/viability in the apex. Pt subsequently underwent staged PCI of mLAD 5/13 with Dr. Pires.      Today pt was seen and examined at bedside, denies complaints of chest pain, dizziness, SOB, palpitations, pain, LE edema, fever, chills. Right radial/groin access site soft, no bleeding or swelling at site, radial pulse 2+/ DP/PT pulses at baseline. No events on tele overnight, VSS, Labs unremarkable/stable, Physical exam WNL. Pt was seen and examined by cardiology attending as well and is deemed stable for discharge per  _______. Pt is to continue ASA/Plavix, Atorvastatin. Pt is to follow up with cardiologist Dr. Simental in 1-2 weeks for post discharge check-up. All medications explained risks/side effects and e-prescribed to patient preferred pharmacy.         Dx: Heart Failure this Admit, History of Heart Failure, Unstable Angina/ACS/NSTEMI/STEMI: YES/NO            If YES: 7 day Follow-Up Appointment Made: Yes/No, Yes: Date/Time; IF No, why not?           Cardiac Rehab (STEMI/NSTEMI/ACS/Unstable Angina/CHF/Post PCI):            *Education on benefits of Cardiac Rehab provided to patient: Yes/No         *Referral and Prescription Given for Cardiac Rehab : Yes/No.  If No, Why Not?         *Pt given list of locations & instructed to contact their insurance company to review list of participating providers    Reasons for No Cardiac Rehab Referral Rx (Must select 1 or more options):                Patient Refused            Medical Reason: ex needs Home Care, Home PT            Patient lacks medical coverage for Cardiac Rehab            Pt discharged to Nursing Care/[facility]/Long term Care Facility            Patient Lacks Transportation or no cardiac rehab within 60 minutes driving range            Patient already participates in Cardiac Rehab            Other: (provide details) ex: Hospice patient      AMI: Beta Blocker Prescribed: Yes/No. If no, Why not?            ACE-I/ARB Prescribed: Yes/No. If no, Why not?    Statin Prescribed (STEMI/NSTEMI/UA &/OR PCI this admission):  Yes/No; If No, Why Not?  DAPT: Prescriptions for Aspirin/Plavix/Brilinta/Effient e-prescribed to patient's pharmacy Yes/No__.                *No Aspirin/Plavix/Brilinta/Effient prescribed due to ___.     69yo non-compliant Male, former smoker, with PMHx of uncontrolled HTN, HLD, DM-II, CAD, PAD (s/p stents in L leg, per pt), sCHF (EF 25-30%), COPD (on home O2 PRN) and CKD (baseline Cr 3) who presented c/o SOB, found to be in acute hypoxic respiratory failure requiring BIPAP in setting of HTN emergency with flash pulm edema, 2/2 medication non compliance. S/p IV diuresis, discontinued when euvolemic. Hospital course significant for uptrending leukocytosis and completed IV abx for suspected CAP on 5/12. Pt R/I NSTEMI with peak Trop 0.16 and TWI in inferolaterals leads and found to be in new Afib, now s/p self conversion to NSR. S/p cardiac cath 5/10 revealing mRCA , mLCx , mLAD 80%, LM normal, access r groin PC and failed R radial. Cardiac viability scan revealed small area of mild reversibility/viability in the apex. Pt subsequently underwent staged PCI of mLAD 5/13 with Dr. Pires.  Pt now s/p cardiac cath (5/13/21) w/ ANSLEY x2 p/mLAD. L radial access site stable.  Patient Cr post cath 3.28 (not JOSE LUIS, at patient's baseline). Patient will follow up closely outpatient with cardiologist Dr. Simental on 5/19/21, and with nephrologist Dr. Richardson on 5/20/21.     Today pt was seen and examined at bedside, denies complaints of chest pain, dizziness, SOB, palpitations, pain, LE edema, fever, chills. Right radial/groin access site soft, no bleeding or swelling at site, radial pulse 2+/ DP/PT pulses at baseline. No events on tele overnight, VSS, Labs unremarkable/stable, Physical exam WNL. Hospital course reviewed with attending cardiologist and patient deemed stable for discharge home. Pt is to continue ASA/Plavix, Atorvastatin. Pt is to follow up with cardiologist Dr. Simental in 1-2 weeks for post discharge check-up. All medications explained risks/side effects and e-prescribed to patient preferred pharmacy.         Dx: Heart Failure this Admit, History of Heart Failure, Unstable Angina/ACS/NSTEMI/STEMI: YES/NO            If YES: 7 day Follow-Up Appointment Made: Yes/No, Yes: Date/Time; IF No, why not?           Cardiac Rehab (STEMI/NSTEMI/ACS/Unstable Angina/CHF/Post PCI):            *Education on benefits of Cardiac Rehab provided to patient: Yes/No         *Referral and Prescription Given for Cardiac Rehab : Yes/No.  If No, Why Not?         *Pt given list of locations & instructed to contact their insurance company to review list of participating providers    AMI: Beta Blocker Prescribed: Yes/No. If no, Why not?            ACE-I/ARB Prescribed: Yes/No. If no, Why not? Contraindicated at this time    Statin Prescribed (STEMI/NSTEMI/UA &/OR PCI this admission):  Yes/No; If No, Why Not?    DAPT: Prescriptions for Aspirin/Plavix e-prescribed to patient's pharmacy Yes/No__.                *No Aspirin/Plavix/Brilinta/Effient prescribed due to ___.     71yo non-compliant Male, former smoker, with PMHx of uncontrolled HTN, HLD, DM-II, CAD, PAD (s/p stents in L leg, per pt), sCHF (EF 25-30%), COPD (on home O2 PRN) and CKD (baseline Cr 3) who presented c/o SOB, found to be in acute hypoxic respiratory failure requiring BIPAP in setting of HTN emergency with flash pulm edema, 2/2 medication non compliance. S/p IV diuresis, discontinued when euvolemic. Hospital course significant for uptrending leukocytosis and completed IV abx for suspected CAP on 5/12. Pt R/I NSTEMI with peak Trop 0.16 and TWI in inferolaterals leads and found to be in new Afib, now s/p self conversion to NSR. S/p cardiac cath 5/10 revealing mRCA , mLCx , mLAD 80%, LM normal, access r groin PC and failed R radial. Cardiac viability scan revealed small area of mild reversibility/viability in the apex. Pt subsequently underwent staged PCI of mLAD 5/13 with Dr. Pires.  Pt now s/p cardiac cath (5/13/21) w/ ANSLEY x2 p/mLAD. L radial access site stable.  Patient Cr post cath 3.28 (not JOSE LUIS, at patient's baseline). Patient will follow up closely outpatient with cardiologist Dr. Simental on 5/19/21, and with nephrologist Dr. Richardson on 5/20/21.     Today pt was seen and examined at bedside, denies complaints of chest pain, dizziness, SOB, palpitations, pain, LE edema, fever, chills. Right radial/groin access site soft, no bleeding or swelling at site, radial pulse 2+/ DP/PT pulses at baseline. No events on tele overnight, VSS, Labs unremarkable/stable, Physical exam WNL. Hospital course reviewed with attending cardiologist and patient deemed stable for discharge home. Pt is to continue ASA/Plavix, Atorvastatin. Pt is to follow up with cardiologist Dr. Simental in 1-2 weeks for post discharge check-up. All medications explained risks/side effects and e-prescribed to patient preferred pharmacy.  Of note, patient will be on TAPT w/ ASA/Plavix/Eliquis x2 week, and then discontinue ASA and continue w/ Plavix/Eliquis.        Dx: Heart Failure this Admit, History of Heart Failure, Unstable Angina/ACS/NSTEMI/STEMI: YES/NO            If YES: 7 day Follow-Up Appointment Made: Yes/No, Yes: Date/Time; IF No, why not?           Cardiac Rehab (STEMI/NSTEMI/ACS/Unstable Angina/CHF/Post PCI):            *Education on benefits of Cardiac Rehab provided to patient: Yes/No         *Referral and Prescription Given for Cardiac Rehab : Yes/No.  If No, Why Not?         *Pt given list of locations & instructed to contact their insurance company to review list of participating providers    AMI: Beta Blocker Prescribed: Yes/No. If no, Why not?            ACE-I/ARB Prescribed: Yes/No. If no, Why not? Contraindicated at this time    Statin Prescribed (STEMI/NSTEMI/UA &/OR PCI this admission):  Yes/No; If No, Why Not?    DAPT: Prescriptions for Aspirin/Plavix e-prescribed to patient's pharmacy Yes/No__.                *No Aspirin/Plavix/Brilinta/Effient prescribed due to ___.     71yo non-compliant Male, former smoker, with PMHx of uncontrolled HTN, HLD, DM-II, CAD, PAD (s/p stents in L leg, per pt), sCHF (EF 25-30%), COPD (on home O2 PRN) and CKD (baseline Cr 3) who presented c/o SOB, found to be in acute hypoxic respiratory failure requiring BIPAP in setting of HTN emergency with flash pulm edema, 2/2 medication non compliance. S/p IV diuresis, discontinued when euvolemic. Hospital course significant for uptrending leukocytosis and completed IV abx for suspected CAP on 5/12. Pt R/I NSTEMI with peak Trop 0.16 and TWI in inferolaterals leads and found to be in new Afib, now s/p self conversion to NSR. S/p cardiac cath 5/10 revealing mRCA , mLCx , mLAD 80%, LM normal, access r groin PC and failed R radial. Cardiac viability scan revealed small area of mild reversibility/viability in the apex. Pt subsequently underwent staged PCI of mLAD 5/13 with Dr. Pires.  Pt now s/p cardiac cath (5/13/21) w/ ANSLEY x2 p/mLAD. L radial access site stable.  Patient Cr post cath 3.28 (not JOSE LUIS, at patient's baseline), and downtrended to 3.12 on day of discharge. Patient will follow up closely outpatient with cardiologist Dr. Simental on 5/19/21, and with nephrologist Dr. Richardson on 5/20/21.     Today pt was seen and examined at bedside, denies complaints of chest pain, dizziness, SOB, palpitations, pain, LE edema, fever, chills. Right radial/groin access site soft, no bleeding or swelling at site, radial pulse 2+/ DP/PT pulses at baseline. No events on tele overnight, VSS, Labs unremarkable/stable, Physical exam WNL. Hospital course reviewed with attending cardiologist and patient deemed stable for discharge home. Pt is to continue ASA/Plavix, Atorvastatin. Pt is to follow up with cardiologist Dr. Heredia 5/19 for post discharge check-up. All medications explained risks/side effects and e-prescribed to patient preferred pharmacy.  Of note, patient will be on TAPT w/ ASA/Plavix/Eliquis x2 week, and then discontinue ASA and continue w/ Plavix/Eliquis.        Dx: Heart Failure this Admit, History of Heart Failure, Unstable Angina/ACS/NSTEMI/STEMI: YES/NO            If YES: 7 day Follow-Up Appointment Made: Yes/No, Yes: 5/19/21 @ 10:40AM           Cardiac Rehab (STEMI/NSTEMI/ACS/Unstable Angina/CHF/Post PCI):            *Education on benefits of Cardiac Rehab provided to patient: Yes/No         *Referral and Prescription Given for Cardiac Rehab : Yes/No.  If No, Why Not?         *Pt given list of locations & instructed to contact their insurance company to review list of participating providers    AMI: Beta Blocker Prescribed: Yes/No. If no, Why not?            ACE-I/ARB Prescribed: Yes/No. If no, Why not? Contraindicated at this time    Statin Prescribed (STEMI/NSTEMI/UA &/OR PCI this admission):  Yes/No; If No, Why Not?    DAPT: Prescriptions for Aspirin/Plavix e-prescribed to patient's pharmacy Yes/No__.

## 2021-05-12 NOTE — PROGRESS NOTE ADULT - ASSESSMENT
71yo non-compliant Male, former smoker, with PMHx of uncontrolled HTN, HLD, DM-II, CAD, PAD (s/p stents in L leg, per pt), sCHF (EF 25-30%), COPD (on home O2 PRN) and CKD (baseline Cr 3) who presented c/o SOB, found to be in acute hypoxic respiratory failure requiring BIPAP in setting of HTN emergency with flash pulm edema, 2/2 medication non compliance. Pt R/I NSTEMI with peak Trop 0.16 and TWI in inferolaterals leads and found to be in new Afib, now s/p self conversion to NSR. Hospital course significant for uptrending leukocytosis and currently on IV abx for suspected CAP. Pt now euvolemic, off diuretics and s/p cardiac cath 5/10 revealing 3vCAD, CTS consulted, first portion of cardiac viability study done 5/11.  71yo non-compliant Male, former smoker, with PMHx of uncontrolled HTN, HLD, DM-II, CAD, PAD (s/p stents in L leg, per pt), sCHF (EF 25-30%), COPD (on home O2 PRN) and CKD (baseline Cr 3) who presented c/o SOB, found to be in acute hypoxic respiratory failure requiring BIPAP in setting of HTN emergency with flash pulm edema, 2/2 medication non compliance. Pt R/I NSTEMI with peak Trop 0.16 and TWI in inferolaterals leads and found to be in new Afib, now s/p self conversion to NSR. Hospital course significant for uptrending leukocytosis and completed IV abx for suspected CAP on 5/12. Pt now euvolemic, off diuretics and s/p cardiac cath 5/10 revealing 3vCAD, CTS consulted, second portion of cardiac viability study done 5/12.  71yo non-compliant Male, former smoker, with PMHx of uncontrolled HTN, HLD, DM-II, CAD, PAD (s/p stents in L leg, per pt), sCHF (EF 25-30%), COPD (on home O2 PRN) and CKD (baseline Cr 3) who presented c/o SOB, found to be in acute hypoxic respiratory failure requiring BIPAP in setting of HTN emergency with flash pulm edema, 2/2 medication non compliance. Pt R/I NSTEMI with peak Trop 0.16 and TWI in inferolaterals leads and found to be in new Afib, now s/p self conversion to NSR. Hospital course significant for uptrending leukocytosis and completed IV abx for suspected CAP on 5/12. Pt now euvolemic, off diuretics and s/p cardiac cath 5/10 revealing 3vCAD, CTS consulted, second portion of cardiac viability study done 5/12.    71yo non-compliant Male, former smoker, with PMHx of uncontrolled HTN, HLD, DM-II, CAD, PAD (s/p stents in L leg, per pt), sCHF (EF 25-30%), COPD (on home O2 PRN) and CKD (baseline Cr 3) who presented c/o SOB, found to be in acute hypoxic respiratory failure requiring BIPAP in setting of HTN emergency with flash pulm edema, 2/2 medication non compliance. Pt R/I NSTEMI with peak Trop 0.16 and TWI in inferolaterals leads and found to be in new Afib, now s/p self conversion to NSR. Hospital course significant for uptrending leukocytosis and completed IV abx for suspected CAP on 5/12. Pt now euvolemic, off diuretics and s/p cardiac cath 5/10 revealing 3vCAD. Cardiac viability scan reveals minimal viable tissue. Plan for staged PCI of LAD 5/13 with Dr. Pires.

## 2021-05-12 NOTE — PROGRESS NOTE ADULT - SUBJECTIVE AND OBJECTIVE BOX
S: Pt seen and examined bedside.  Patient denies C/P, SOB, N/V, dizziness, palpitations, and diaphoresis.  Pt denies fever/chills, dysuria, abdominal pain, diarrhea, and cough  12 Point ROS otherwise negative except as per HPI/subjective.     O: Vital Signs Last 24 Hrs  T(C): 36.4 (12 May 2021 09:59), Max: 37.2 (11 May 2021 18:20)  T(F): 97.6 (12 May 2021 09:59), Max: 99 (11 May 2021 18:20)  HR: 74 (12 May 2021 08:46) (67 - 90)  BP: 140/67 (12 May 2021 08:46) (140/67 - 166/79)  BP(mean): 96 (12 May 2021 08:46) (96 - 96)  RR: 18 (12 May 2021 08:46) (18 - 18)  SpO2: 97% (12 May 2021 08:46) (97% - 98%)    PHYSICAL EXAM:  GEN: NAD  HEENT: No JVD  PULM:  CTA B/L  CARD:  RRR, S1 and S2   ABD: +BS, NT, soft/ND	  EXT: No Edema B/L LE  NEURO: A+Ox3, no focal deficit  PSYCH: Mood Appropriate    LABS:                        9.1    9.11  )-----------( 168      ( 12 May 2021 05:50 )             30.2         142  |  104  |  41<H>  ----------------------------<  112<H>  4.5   |  27  |  2.72<H>    Ca    8.8      12 May 2021 05:55  Phos  3.6       Mg     2.2           PTT - ( 12 May 2021 05:48 )  PTT:96.6 sec  Troponin T, Serum: 0.14 ng/mL (21 @ 07:03)  Troponin T, Serum: 0.16 ng/mL (21 @ 22:39)  Troponin T, Serum: 0.14 ng/mL (21 @ 16:34)       @ 07:01  -   @ 07:00  --------------------------------------------------------  IN: 162 mL / OUT: 2680 mL / NET: -2518 mL     @ 07:01  -   @ 12:54  --------------------------------------------------------  IN: 180 mL / OUT: 550 mL / NET: -370 mL      Daily     Daily Weight in k.7 (12 May 2021 04:55)   S: Pt seen and examined bedside. Pt reports he is feeling well today, denies any complaints, aware of plan for second part of cardiac viability scan today.   Patient denies C/P, SOB, N/V, dizziness, palpitations, and diaphoresis.  Pt denies fever/chills, dysuria, abdominal pain, diarrhea, and cough  12 Point ROS otherwise negative except as per HPI/subjective.     O: Vital Signs Last 24 Hrs  T(C): 36.4 (12 May 2021 09:59), Max: 37.2 (11 May 2021 18:20)  T(F): 97.6 (12 May 2021 09:59), Max: 99 (11 May 2021 18:20)  HR: 74 (12 May 2021 08:46) (67 - 90)  BP: 140/67 (12 May 2021 08:46) (140/67 - 166/79)  BP(mean): 96 (12 May 2021 08:46) (96 - 96)  RR: 18 (12 May 2021 08:46) (18 - 18)  SpO2: 97% (12 May 2021 08:46) (97% - 98%)    PHYSICAL EXAM:  GEN: NAD  HEENT: No JVD  PULM:  CTA B/L, no WRR  CARD:  RRR, S1 and S2, no murmrus  ABD: +BS, NT, soft/ND	  EXT: No Edema B/L LE, distal pulses intact   NEURO: A+Ox3, no focal deficit  PSYCH: Mood Appropriate    LABS:                        9.1    9.11  )-----------( 168      ( 12 May 2021 05:50 )             30.2         142  |  104  |  41<H>  ----------------------------<  112<H>  4.5   |  27  |  2.72<H>    Ca    8.8      12 May 2021 05:55  Phos  3.6     11  Mg     2.2           PTT - ( 12 May 2021 05:48 )  PTT:96.6 sec  Troponin T, Serum: 0.14 ng/mL (21 @ 07:03)  Troponin T, Serum: 0.16 ng/mL (21 @ 22:39)  Troponin T, Serum: 0.14 ng/mL (21 @ 16:34)       @ 07:01  -   @ 07:00  --------------------------------------------------------  IN: 162 mL / OUT: 2680 mL / NET: -2518 mL     @ 07:01  -   @ 12:54  --------------------------------------------------------  IN: 180 mL / OUT: 550 mL / NET: -370 mL      Daily     Daily Weight in k.7 (12 May 2021 04:55)

## 2021-05-12 NOTE — DISCHARGE NOTE PROVIDER - NSDCFUADDAPPT_GEN_ALL_CORE_FT
(1) Follow up with you cardiologist, Dr. Simental, on WEDNESDAY, MAY 19th, 2021 at 10:40am.   (2) Follow up with nephrologist, Dr. Richardson, on THURSDAY, MAY 20th, 2021 at 4:20pm.

## 2021-05-12 NOTE — PROGRESS NOTE ADULT - PROBLEM SELECTOR PLAN 3
presented with /131 in the setting of medication non compliance, currently SBP 130s-180's  -s/p Nitro gtt  -Uptritrate Imdur to 60mg QD and continue Carvedilol 25mg BID, Hydralazine 100mg TID and Amlodipine 10mg QD  -Holding home Losartan 100mg due to elevated Cr  -Holding home Clonidine 0.1mg BID presented with /131 in the setting of medication non compliance, currently SBP 140s-160's  -s/p Nitro gtt  -Uptritrated Imdur to 90mg QD on 5/12, continue Carvedilol 25mg BID, Hydralazine 100mg TID and Amlodipine 10mg QD  -Holding home Losartan 100mg due to elevated Cr  -Holding home Clonidine 0.1mg BID presented with /131 in the setting of medication non compliance, currently SBP 140s-160's  -s/p Nitro gtt  -Uptritrated Imdur to 90mg QD on 5/12, continue Carvedilol 25mg BID, Hydralazine 100mg TID and Amlodipine 10mg QD, Torsemide 20mg QD  -Holding home Losartan 100mg due to elevated Cr  -Holding home Clonidine 0.1mg BID

## 2021-05-12 NOTE — PROGRESS NOTE ADULT - PROBLEM SELECTOR PLAN 6
presented with new onset Afib, s/p self conversion to NSR  -Continue Carvedilol 25mg BID and Heparin gtt  -Transition to Eliquis if no CTS intervention presented with new onset Afib, s/p self conversion to NSR  -Continue Carvedilol 25mg BID and Heparin gtt  -Transition to Eliquis post PCI

## 2021-05-12 NOTE — PROGRESS NOTE ADULT - PROBLEM SELECTOR PLAN 1
Presented with substernal CP, currently CP free and HD stable  -Trop peak 0.16 and downtrended to 0.14  -EKG developed TWI in inferolateral leads  -s/p diagnostic cardiac cath 5/10 revealing 3vCAD, mRCA , mLCx , mLAD 80%, LM normal, access r groin PC and failed R radial  -CTS consulted, pending second portion of cardiac viability scan on 5/12  -Continue Heparin gtt, ASA 81mg QD, Coreg 25mg BID, Atorvastatin 80mg HS Presented with substernal CP, currently CP free and HD stable  -Trop peak 0.16 and downtrended to 0.14  -EKG developed TWI in inferolateral leads  -s/p diagnostic cardiac cath 5/10 revealing 3vCAD, mRCA , mLCx , mLAD 80%, LM normal, access r groin PC and failed R radial  -CTS consulted, second portion of cardiac viability scan on 5/12, f/u results   -Continue Heparin gtt, ASA 81mg QD, Coreg 25mg BID, Atorvastatin 80mg HS Presented with substernal CP, currently CP free and HD stable  -Trop peak 0.16 and downtrended to 0.14  -EKG developed TWI in inferolateral leads  -s/p Aspirin and Plavix load, diagnostic cardiac cath 5/10 revealing 3vCAD, mRCA , mLCx , mLAD 80%, LM normal, access r groin PC and failed R radial  - Cardiac viability 5/12/21: Myocardial perfusion imaging abnormal, small area of mild reversibility/viability in the apex, no viability in the entire inferior and basal inferolateral walls, LV dilated, global hypokinesis with decreased myocardial thickening in the inferior wall. EF improved form 32% on Day 1 to 51% on Day 2.  - NPO after MN for staged PCI of LAD with Dr. Pires 5/13/21  -Continue Heparin gtt, ASA 81mg QD, Coreg 25mg BID, Atorvastatin 80mg HS

## 2021-05-12 NOTE — GOALS OF CARE CONVERSATION - ADVANCED CARE PLANNING - CONVERSATION DETAILS
Pt states that he wants all extraordinary measures in an emergency. Pt states that he would like to be FULL CODE, he wants all extraordinary measures in case of an emergency.

## 2021-05-12 NOTE — PROGRESS NOTE ADULT - ATTENDING COMMENTS
Initial attending contact date 5/6/21     . See PA note written above for details. I reviewed the PA documentation. I have personally seen and examined this patient. I reviewed vitals, labs, medications, cardiac studies, and additional imaging. I agree with the above PA's findings and plans as written above with the following additions/statements.    -Pt is 69yo non-compliant (reports forgetting to take meds) M w/ PMHx of HLD, DM T2, uncontrolled HTN, CAD (questionable Hx of stents? pt denies but outpatient may have more info??), PAD (pt reporting stents in L lower extremity), HFrEF (EF 23% in 2017), COPD (pt w/ home O2 via [place] PRN), CKD (unknown Cr baseline) admitted with HTN emergency ( due to med non compliance) in setting of acute on chronic systolic HF/NSTEMI/ new onset A fib  -HTN emergency: Initially on nitro drip, now tapered off, BP mildly still elevated. Increase imdur 90 mg po qd. Cont hydralazine 100 mg po tid, coreg 25 gm po bid, and amlodipine 10 mg po qd   -NSTEMI: with chest tightness in setting of HTN emergency with ischemic T wave inversion v4-v6. Pk trop .16 now trending down. Cont ASA, plavix, coreg, atorva 80.  -s/p CATH 5/10: mRCA 100% occluded w/o collaterals, mCx 100% occluded, mLAD 80%. ECHO reviewed : EF 25 with severely hypokinetic walls, anteroseptum moderately hypokinetic.   -Viability study: no viability inferior and inferiorlateral walls, apical viability noted  -Given above findings, plan for PCI LAD 5/13  -CRI: Crt stable   -New onset A fib : now in NSR. Cont IV heparin  -Acute on chronic systolic HF: SOB improved. - 14.6 L since admission. Hold diuretics given cath 5/12  -PT eval- no skilled needs  -Likely DC 5/14 pending cath

## 2021-05-12 NOTE — PROGRESS NOTE ADULT - PROBLEM SELECTOR PLAN 9
-Continue Tamsulosin 0.4mg QD    F: None  E: Replete if K<4 or Mag<2  N: DASH Diet with 1L fluid restriction  VTEppx: Heparin gtt  Dispo: pending Cardiac viability and CTS recs -Continue Tamsulosin 0.4mg QD    F: None  E: Replete if K<4 or Mag<2  N: DASH Diet with 1L fluid restriction  VTEppx: Heparin gtt  Dispo: pending Cardiac viability results and CTS recs  Case discussed with Dr. Reynolds -Continue Tamsulosin 0.4mg QD    F: None  E: Replete if K<4 or Mag<2  N: DASH Diet with 1L fluid restriction  VTEppx: Heparin gtt  Dispo: pending staged PCI 5/13  Case discussed with Dr. Reynolds

## 2021-05-12 NOTE — DISCHARGE NOTE PROVIDER - NSDCMRMEDTOKEN_GEN_ALL_CORE_FT
allopurinol 100 mg oral tablet: 1 tab(s) orally 2 times a day  aspirin 81 mg oral tablet, chewable: 1 tab(s) orally once a day  atorvastatin 80 mg oral tablet: 1 tab(s) orally once a day  calcitriol 0.25 mcg oral capsule: 1 cap(s) orally once a day  cilostazol 50 mg oral tablet: 1 tab(s) orally 2 times a day  cloNIDine 0.1 mg oral tablet: 1 tab(s) orally 2 times a day  Coreg 25 mg oral tablet: 1 tab(s) orally 2 times a day  Dulera 100 mcg-5 mcg/inh inhalation aerosol: 2 puff(s) inhaled 2 times a day  folic acid 1 mg oral tablet: 1 tab(s) orally once a day  hydrALAZINE 100 mg oral tablet: 1 tab(s) orally 3 times a day  isosorbide mononitrate 60 mg oral tablet, extended release: 1 tab(s) orally once a day (in the morning)  Januvia 50 mg oral tablet: 1 tab(s) orally once a day  levalbuterol 45 mcg/inh inhalation aerosol: 2 puff(s) inhaled every 4 hours, As Needed  losartan 100 mg oral tablet: 1 tab(s) orally once a day  Norvasc 5 mg oral tablet: 1 tab(s) orally once a day  tamsulosin 0.4 mg oral capsule: 1 cap(s) orally once a day  torsemide 20 mg oral tablet: 1 tab(s) orally once a day   allopurinol 100 mg oral tablet: 1 tab(s) orally 2 times a day  aspirin 81 mg oral tablet, chewable: 1 tab(s) orally once a day  atorvastatin 80 mg oral tablet: 1 tab(s) orally once a day  calcitriol 0.25 mcg oral capsule: 1 cap(s) orally once a day  cilostazol 50 mg oral tablet: 1 tab(s) orally 2 times a day  cloNIDine 0.1 mg oral tablet: 1 tab(s) orally 2 times a day  Coreg 25 mg oral tablet: 1 tab(s) orally 2 times a day  Dulera 100 mcg-5 mcg/inh inhalation aerosol: 2 puff(s) inhaled 2 times a day  Eliquis 5 mg oral tablet: 1 tab(s) orally 2 times a day   folic acid 1 mg oral tablet: 1 tab(s) orally once a day  hydrALAZINE 100 mg oral tablet: 1 tab(s) orally 3 times a day  isosorbide mononitrate 60 mg oral tablet, extended release: 1 tab(s) orally once a day (in the morning)  Januvia 50 mg oral tablet: 1 tab(s) orally once a day  levalbuterol 45 mcg/inh inhalation aerosol: 2 puff(s) inhaled every 4 hours, As Needed  losartan 100 mg oral tablet: 1 tab(s) orally once a day  Norvasc 5 mg oral tablet: 1 tab(s) orally once a day  tamsulosin 0.4 mg oral capsule: 1 cap(s) orally once a day  torsemide 20 mg oral tablet: 1 tab(s) orally once a day   allopurinol 100 mg oral tablet: 1 tab(s) orally 2 times a day  aspirin 81 mg oral tablet, chewable: 1 tab(s) orally once a day  atorvastatin 80 mg oral tablet: 1 tab(s) orally once a day  calcitriol 0.25 mcg oral capsule: 1 cap(s) orally once a day  Cardiac Rehabilitation : 3x/week for 12 weeks for diagnosis of Coronary Artery Disease and Systolic Congestive Heart Failure  cilostazol 50 mg oral tablet: 1 tab(s) orally 2 times a day  cloNIDine 0.1 mg oral tablet: 1 tab(s) orally 2 times a day  Coreg 25 mg oral tablet: 1 tab(s) orally 2 times a day  Dulera 100 mcg-5 mcg/inh inhalation aerosol: 2 puff(s) inhaled 2 times a day  Eliquis 5 mg oral tablet: 1 tab(s) orally 2 times a day   folic acid 1 mg oral tablet: 1 tab(s) orally once a day  hydrALAZINE 100 mg oral tablet: 1 tab(s) orally 3 times a day  isosorbide mononitrate 60 mg oral tablet, extended release: 1 tab(s) orally once a day (in the morning)  Januvia 50 mg oral tablet: 1 tab(s) orally once a day  levalbuterol 45 mcg/inh inhalation aerosol: 2 puff(s) inhaled every 4 hours, As Needed  losartan 100 mg oral tablet: 1 tab(s) orally once a day  Norvasc 5 mg oral tablet: 1 tab(s) orally once a day  tamsulosin 0.4 mg oral capsule: 1 cap(s) orally once a day  torsemide 20 mg oral tablet: 1 tab(s) orally once a day   allopurinol 100 mg oral tablet: 1 tab(s) orally 2 times a day  amLODIPine 10 mg oral tablet: 1 tab(s) orally once a day  Aspirin Enteric Coated 81 mg oral delayed release tablet: 1 tab(s) orally once a day  calcitriol 0.25 mcg oral capsule: 1 cap(s) orally once a day  Cardiac Rehabilitation : 3x/week for 12 weeks for diagnosis of Coronary Artery Disease and Systolic Congestive Heart Failure  clopidogrel 75 mg oral tablet: 1 tab(s) orally once a day  Coreg 25 mg oral tablet: 1 tab(s) orally 2 times a day  Dulera 100 mcg-5 mcg/inh inhalation aerosol: 2 puff(s) inhaled 2 times a day  Eliquis 5 mg oral tablet: 1 tab(s) orally 2 times a day   folic acid 1 mg oral tablet: 1 tab(s) orally once a day  hydrALAZINE 100 mg oral tablet: 1 tab(s) orally 3 times a day  isosorbide mononitrate 120 mg oral tablet, extended release: 1 tab(s) orally once a day  Januvia 50 mg oral tablet: 1 tab(s) orally once a day  levalbuterol 45 mcg/inh inhalation aerosol: 2 puff(s) inhaled every 4 hours, As Needed  Lipitor 80 mg oral tablet: 1 tab(s) orally once a day  tamsulosin 0.4 mg oral capsule: 1 cap(s) orally once a day  torsemide 20 mg oral tablet: 1 tab(s) orally once a day

## 2021-05-12 NOTE — DISCHARGE NOTE PROVIDER - CARE PROVIDER_API CALL
Mykel Richardson)  Internal Medicine; Nephrology  [address], [address] [place] [address]  Phone: [phone]  Fax: [phone]  Scheduled Appointment: 05/20/2021 04:20 PM    Hola WADE), [address] [address]  Phone: [phone]  Fax: (   )    -  Scheduled Appointment: 05/19/2021 10:40 AM

## 2021-05-12 NOTE — DISCHARGE NOTE PROVIDER - NSDCCPCAREPLAN_GEN_ALL_CORE_FT
PRINCIPAL DISCHARGE DIAGNOSIS  Diagnosis: NSTEMI (non-ST elevation myocardial infarction)  Assessment and Plan of Treatment: You presented to the hospital with acute coronary syndrome, and were found to have blockages in the arteries that supply blood to your heart.   You underwent a cardiac angiogram and received two (2) stents to your Left Anterior Descending artery. PLEASE CONTINUE ASPIRIN 81MG DAILY AND PLAVIX 75MG DAILY. DO NOT STOP THESE MEDICATIONS FOR ANY REASON AS THEY ARE KEEPING YOUR STENT OPEN AND PREVENTING A HEART ATTACK. Avoid strenuous activity or heavy lifting for the next five days. Do not take a bath or swim for the next five days; you may shower. For any bleeding or hematoma formation (hardened blood collection under the skin) at the access site of RIGHT WRIST please hold pressure and go to the emergency room. Please follow up with  ___ in 1-2 weeks. For recurrent chest pain, please call your doctor or go to the emergency room.      SECONDARY DISCHARGE DIAGNOSES  Diagnosis: Hypertensive emergency  Assessment and Plan of Treatment: Part of your initial presentation was respiratory distress likely in the setting of hypertensive emergency. Your blood pressure was severely elevated. We have changed your blood pressure regimen to the following: Imdur 120mg once daily, Carvedilol 25mg twice daily, Hydralazine 100mg three times daily (every 8 hours), Amlodipine 10mg once daily. Please continue to following up with your cardiologist for management of your blood pressure.    Diagnosis: Atrial fibrillation  Assessment and Plan of Treatment: You were found to be in atrial fibrillation (a type of irregular heartbeat) here in the hospital. People with atrial fibrillation are at an increased risk of forming a blood clot in the heart, which can travel to the brain, causing a stroke, or to other parts of the body. You have been placed on a Eliquis , it is important you take this medication as directed. ELIQUIS lowers your chance of having a stroke by helping to prevent clots from forming. If you stop taking ELIQUIS, you may have increased risk of forming a blood clot in your heart which can cause a stroke. Do not stop taking ELIQUIS without talking to your cardiologist. Additionally it is important to make sure your heart rate stays controlled, you have been started on Carvedilol to help control your heart rate.    Diagnosis: Systolic CHF  Assessment and Plan of Treatment: -You have a history of weakened heart muscle called congestive heart failure.   -Please make sure you follow up with your cardiologist within one week of discharge.   -Please weigh yourself daily: if you have gained more than 2-3 lbs in one day or 5 lbs in one week contact your doctor immediately as you may be retaining water weight  -In addition, restrict your salt intake to less than 2 grams a day  -If you develop worsening shortening of breath, leg swelling, fatigue, chest pain, difficulty sleeping at night due to shortness of breath, contact your cardiologist immediately.   - Please CONTINUE taking Torsemide 20mg once daily starting on Saturday, 5/15/2021.    Diagnosis: CKD (chronic kidney disease)  Assessment and Plan of Treatment: You have underlying kidney disease. It is very important that you follow up closely with our nephrology team. You have an appointment with Nephrologist Dr. Richardson on THURSDAY, MAY 20th 2021 at 4:20pm.    Diagnosis: Diabetes  Assessment and Plan of Treatment: Please continue medications as listed for diabetes. Maintain a low carbohydrate, low sugar diet. Check for your blood sugars regularly.    Diagnosis: HLD (hyperlipidemia)  Assessment and Plan of Treatment: Please continue Atorvastatin 80mg once daily to keep your cholesterol low. High cholesterol contributes to heart disease.     PRINCIPAL DISCHARGE DIAGNOSIS  Diagnosis: NSTEMI (non-ST elevation myocardial infarction)  Assessment and Plan of Treatment: You presented to the hospital with acute coronary syndrome, and were found to have blockages in the arteries that supply blood to your heart.   You underwent a cardiac angiogram and received two (2) stents to your Left Anterior Descending artery. PLEASE CONTINUE ASPIRIN 81MG DAILY AND PLAVIX 75MG DAILY. DO NOT STOP THESE MEDICATIONS FOR ANY REASON AS THEY ARE KEEPING YOUR STENT OPEN AND PREVENTING A HEART ATTACK. Avoid strenuous activity or heavy lifting for the next five days. Do not take a bath or swim for the next five days; you may shower. For any bleeding or hematoma formation (hardened blood collection under the skin) at the access site of LEFT WRIST please hold pressure and go to the emergency room. Please follow up with Dr. MAGDALENO on WEDNESDAY, MAY 19th at 10:40am. For recurrent chest pain, please call your doctor or go to the emergency room.      SECONDARY DISCHARGE DIAGNOSES  Diagnosis: Hypertensive emergency  Assessment and Plan of Treatment: Part of your initial presentation was respiratory distress likely in the setting of hypertensive emergency. Your blood pressure was severely elevated. We have changed your blood pressure regimen to the following: Imdur 120mg once daily, Carvedilol 25mg twice daily (every 12 hours), Hydralazine 100mg three times daily (every 8 hours), Amlodipine 10mg once daily. Please continue to following up with your cardiologist for management of your blood pressure.    Diagnosis: Atrial fibrillation  Assessment and Plan of Treatment: You were found to be in atrial fibrillation (a type of irregular heartbeat) here in the hospital. People with atrial fibrillation are at an increased risk of forming a blood clot in the heart, which can travel to the brain, causing a stroke, or to other parts of the body. You have been placed on a Eliquis , it is important you take this medication as directed. ELIQUIS lowers your chance of having a stroke by helping to prevent clots from forming. If you stop taking ELIQUIS, you may have increased risk of forming a blood clot in your heart which can cause a stroke. Do not stop taking ELIQUIS without talking to your cardiologist. Additionally it is important to make sure your heart rate stays controlled, you have been started on Carvedilol to help control your heart rate.    Diagnosis: Systolic CHF  Assessment and Plan of Treatment: -You have a history of weakened heart muscle called congestive heart failure.   -Please make sure you follow up with your cardiologist within one week of discharge.   -Please weigh yourself daily: if you have gained more than 2-3 lbs in one day or 5 lbs in one week contact your doctor immediately as you may be retaining water weight  -In addition, restrict your salt intake to less than 2 grams a day  -If you develop worsening shortening of breath, leg swelling, fatigue, chest pain, difficulty sleeping at night due to shortness of breath, contact your cardiologist immediately.   - Please CONTINUE taking Torsemide 20mg once daily starting on Saturday, 5/15/2021.    Diagnosis: CKD (chronic kidney disease)  Assessment and Plan of Treatment: You have underlying kidney disease. It is very important that you follow up closely with our nephrology team. You have an appointment with Nephrologist Dr. Richardson on THURSDAY, MAY 20th 2021 at 4:20pm.    Diagnosis: Diabetes  Assessment and Plan of Treatment: Please continue medications as listed for diabetes. Maintain a low carbohydrate, low sugar diet. Check for your blood sugars regularly.    Diagnosis: HLD (hyperlipidemia)  Assessment and Plan of Treatment: Please continue Atorvastatin 80mg once daily to keep your cholesterol low. High cholesterol contributes to heart disease.     PRINCIPAL DISCHARGE DIAGNOSIS  Diagnosis: NSTEMI (non-ST elevation myocardial infarction)  Assessment and Plan of Treatment: You presented to the hospital with acute coronary syndrome, and were found to have blockages in the arteries that supply blood to your heart.   You underwent a cardiac angiogram and received two (2) stents to your Left Anterior Descending artery. PLEASE CONTINUE ASPIRIN 81MG DAILY AND PLAVIX 75MG DAILY AND ELIQUIS 5MG TWICE DAILY. You can stop taking Aspirin after 2 weeks (stop on Friday 5/28/2021).  DO NOT STOP THESE MEDICATIONS FOR ANY REASON AS THEY ARE KEEPING YOUR STENT OPEN AND PREVENTING A HEART ATTACK. Avoid strenuous activity or heavy lifting for the next five days. Do not take a bath or swim for the next five days; you may shower. For any bleeding or hematoma formation (hardened blood collection under the skin) at the access site of LEFT WRIST please hold pressure and go to the emergency room. Please follow up with Dr. MAGDALENO on WEDNESDAY, MAY 19th at 10:40am. For recurrent chest pain, please call your doctor or go to the emergency room.      SECONDARY DISCHARGE DIAGNOSES  Diagnosis: Hypertensive emergency  Assessment and Plan of Treatment: Part of your initial presentation was respiratory distress likely in the setting of hypertensive emergency. Your blood pressure was severely elevated. We have changed your blood pressure regimen to the following: Imdur 120mg once daily, Carvedilol 25mg twice daily (every 12 hours), Hydralazine 100mg three times daily (every 8 hours), Amlodipine 10mg once daily. Please continue to following up with your cardiologist for management of your blood pressure.    Diagnosis: Atrial fibrillation  Assessment and Plan of Treatment: You were found to be in atrial fibrillation (a type of irregular heartbeat) here in the hospital. People with atrial fibrillation are at an increased risk of forming a blood clot in the heart, which can travel to the brain, causing a stroke, or to other parts of the body. You have been placed on a Eliquis , it is important you take this medication as directed. ELIQUIS lowers your chance of having a stroke by helping to prevent clots from forming. If you stop taking ELIQUIS, you may have increased risk of forming a blood clot in your heart which can cause a stroke. Do not stop taking ELIQUIS without talking to your cardiologist. Additionally it is important to make sure your heart rate stays controlled, you have been started on Carvedilol to help control your heart rate.    Diagnosis: Systolic CHF  Assessment and Plan of Treatment: -You have a history of weakened heart muscle called congestive heart failure.   -Please make sure you follow up with your cardiologist within one week of discharge.   -Please weigh yourself daily: if you have gained more than 2-3 lbs in one day or 5 lbs in one week contact your doctor immediately as you may be retaining water weight  -In addition, restrict your salt intake to less than 2 grams a day  -If you develop worsening shortening of breath, leg swelling, fatigue, chest pain, difficulty sleeping at night due to shortness of breath, contact your cardiologist immediately.   - Please CONTINUE taking Torsemide 20mg once daily starting on Saturday, 5/15/2021.    Diagnosis: CKD (chronic kidney disease)  Assessment and Plan of Treatment: You have underlying kidney disease. It is very important that you follow up closely with our nephrology team. You have an appointment with Nephrologist Dr. Richardson on THURSDAY, MAY 20th 2021 at 4:20pm.    Diagnosis: Diabetes  Assessment and Plan of Treatment: Please continue medications as listed for diabetes. Maintain a low carbohydrate, low sugar diet. Check for your blood sugars regularly.    Diagnosis: HLD (hyperlipidemia)  Assessment and Plan of Treatment: Please continue Atorvastatin 80mg once daily to keep your cholesterol low. High cholesterol contributes to heart disease.

## 2021-05-12 NOTE — PROGRESS NOTE ADULT - PROBLEM SELECTOR PLAN 5
non oliguric CKD IV, baseline Cr 3; Renal following  -Cr peak at 3.3, continues to downtrending to 2.68 today  -UA with protein and blood, Urine Lytes WNL  -RP US prelim read revealed B/L increased renal echogenicity consistent with medical renal disease  -Hold home Losartan  - F/u PTH  -Renal dose meds, avoid nephrotoxic agents and monitor UOP non oliguric CKD IV, baseline Cr 3; Renal following  -Cr peak at 3.3, continues to downtrending to 2.68 today  -UA with protein and blood, Urine Lytes WNL  -RP US prelim read revealed B/L increased renal echogenicity consistent with medical renal disease  -Hold home Losartan  -F/u PTH  -Renal dose meds, avoid nephrotoxic agents and monitor UOP non oliguric CKD IV, baseline Cr 3; Renal following  -Cr peak at 3.3, 2.72 on 5/12  -UA with protein and blood, Urine Lytes WNL  -RP US prelim read revealed B/L increased renal echogenicity consistent with medical renal disease  -Hold home Losartan  -F/u PTH  -Renal dose meds, avoid nephrotoxic agents and monitor UOP

## 2021-05-12 NOTE — DISCHARGE NOTE PROVIDER - PROVIDER TOKENS
PROVIDER:[TOKEN:[[id number]],SCHEDULEDAPPT:[05/20/2021],SCHEDULEDAPPTTIME:[04:20 PM]],FREE:[LAST:[Hola WADE)],FIRST:[Eldon],PHONE:[phone]:[(   )    -],ADDRESS:[[address] [place] [address]],SCHEDULEDAPPT:[05/19/2021],SCHEDULEDAPPTTIME:[10:40 AM]]

## 2021-05-12 NOTE — PROGRESS NOTE ADULT - PROBLEM SELECTOR PLAN 2
Euvolemic on exam, SpO2 95% RA and net neg 12.2L overall  -BNP 3K, CXR with B/L opacities/right pleural effusion  -Echo 5/5/21: EF 25-30%, with the exception of basal and mid anteroseptum, the remaining segments are mod-severely hypokinetic, borderline dilated LA, aortic root mildly dilated (4cm)  -s/p Lasix IV, holding diuresis post cath  -Continue Carvedilol 25mg BID and Amlodipine 10mg QD. Holding home Losartan 2/2 elevated Cr  -Core Measures, daily weight, strict I&Os with 1L fluid restriction and condom cath Euvolemic on exam, SpO2 95% RA and net neg 12.2L overall  -BNP 3K, CXR with B/L opacities/right pleural effusion  -Echo 5/5/21: EF 25-30%, with the exception of basal and mid anteroseptum, the remaining segments are mod-severely hypokinetic, borderline dilated LA, aortic root mildly dilated (4cm)  -s/p Lasix IV, diuretics held since   -Continue Carvedilol 25mg BID and Amlodipine 10mg QD. Holding home Losartan 2/2 elevated Cr  -Core Measures, daily weight, strict I&Os with 1L fluid restriction and condom cath Euvolemic on exam, SpO2 95% RA and net neg 12.2L overall  -BNP 3K, CXR with B/L opacities/right pleural effusion  -Echo 5/5/21: EF 25-30%, with the exception of basal and mid anteroseptum, the remaining segments are mod-severely hypokinetic, borderline dilated LA, aortic root mildly dilated (4cm)  -s/p Lasix IV, diuretics held pre and post cath. Resumed home Torsemide 20mg PO QD on 5/12  -Continue Carvedilol 25mg BID and Amlodipine 10mg QD. Holding home Losartan 2/2 elevated Cr  -Core Measures, daily weight, strict I&Os with 1L fluid restriction and condom cath Euvolemic on exam, SpO2 95% RA and net neg 12.2L overall  -BNP 3K, CXR with B/L opacities/right pleural effusion  -Echo 5/5/21: EF 25-30%, with the exception of basal and mid anteroseptum, the remaining segments are mod-severely hypokinetic, borderline dilated LA, aortic root mildly dilated (4cm)  -s/p Lasix IV, diuretics held since before cath.   -Continue Carvedilol 25mg BID and Amlodipine 10mg QD. Holding home Losartan 2/2 elevated Cr  -Core Measures, daily weight, strict I&Os with 1L fluid restriction and condom cath

## 2021-05-13 LAB
ANION GAP SERPL CALC-SCNC: 9 MMOL/L — SIGNIFICANT CHANGE UP (ref 5–17)
APTT BLD: 82.9 SEC — HIGH (ref 27.5–35.5)
BASOPHILS # BLD AUTO: 0.07 K/UL — SIGNIFICANT CHANGE UP (ref 0–0.2)
BASOPHILS NFR BLD AUTO: 0.8 % — SIGNIFICANT CHANGE UP (ref 0–2)
BUN SERPL-MCNC: 40 MG/DL — HIGH (ref 7–23)
CALCIUM SERPL-MCNC: 9 MG/DL — SIGNIFICANT CHANGE UP (ref 8.4–10.5)
CHLORIDE SERPL-SCNC: 102 MMOL/L — SIGNIFICANT CHANGE UP (ref 96–108)
CO2 SERPL-SCNC: 27 MMOL/L — SIGNIFICANT CHANGE UP (ref 22–31)
CREAT SERPL-MCNC: 2.93 MG/DL — HIGH (ref 0.5–1.3)
EOSINOPHIL # BLD AUTO: 0.46 K/UL — SIGNIFICANT CHANGE UP (ref 0–0.5)
EOSINOPHIL NFR BLD AUTO: 4.9 % — SIGNIFICANT CHANGE UP (ref 0–6)
GLUCOSE SERPL-MCNC: 100 MG/DL — HIGH (ref 70–99)
HCT VFR BLD CALC: 29.9 % — LOW (ref 39–50)
HGB BLD-MCNC: 9.1 G/DL — LOW (ref 13–17)
IMM GRANULOCYTES NFR BLD AUTO: 0.6 % — SIGNIFICANT CHANGE UP (ref 0–1.5)
LYMPHOCYTES # BLD AUTO: 1.9 K/UL — SIGNIFICANT CHANGE UP (ref 1–3.3)
LYMPHOCYTES # BLD AUTO: 20.4 % — SIGNIFICANT CHANGE UP (ref 13–44)
MAGNESIUM SERPL-MCNC: 2.3 MG/DL — SIGNIFICANT CHANGE UP (ref 1.6–2.6)
MCHC RBC-ENTMCNC: 28.3 PG — SIGNIFICANT CHANGE UP (ref 27–34)
MCHC RBC-ENTMCNC: 30.4 GM/DL — LOW (ref 32–36)
MCV RBC AUTO: 93.1 FL — SIGNIFICANT CHANGE UP (ref 80–100)
MONOCYTES # BLD AUTO: 0.73 K/UL — SIGNIFICANT CHANGE UP (ref 0–0.9)
MONOCYTES NFR BLD AUTO: 7.8 % — SIGNIFICANT CHANGE UP (ref 2–14)
NEUTROPHILS # BLD AUTO: 6.1 K/UL — SIGNIFICANT CHANGE UP (ref 1.8–7.4)
NEUTROPHILS NFR BLD AUTO: 65.5 % — SIGNIFICANT CHANGE UP (ref 43–77)
NRBC # BLD: 0 /100 WBCS — SIGNIFICANT CHANGE UP (ref 0–0)
PLATELET # BLD AUTO: 159 K/UL — SIGNIFICANT CHANGE UP (ref 150–400)
POTASSIUM SERPL-MCNC: 4.3 MMOL/L — SIGNIFICANT CHANGE UP (ref 3.5–5.3)
POTASSIUM SERPL-SCNC: 4.3 MMOL/L — SIGNIFICANT CHANGE UP (ref 3.5–5.3)
PTH-INTACT FLD-MCNC: 87 PG/ML — HIGH (ref 15–65)
RBC # BLD: 3.21 M/UL — LOW (ref 4.2–5.8)
RBC # FLD: 14.1 % — SIGNIFICANT CHANGE UP (ref 10.3–14.5)
SODIUM SERPL-SCNC: 138 MMOL/L — SIGNIFICANT CHANGE UP (ref 135–145)
WBC # BLD: 9.32 K/UL — SIGNIFICANT CHANGE UP (ref 3.8–10.5)
WBC # FLD AUTO: 9.32 K/UL — SIGNIFICANT CHANGE UP (ref 3.8–10.5)

## 2021-05-13 PROCEDURE — 99233 SBSQ HOSP IP/OBS HIGH 50: CPT

## 2021-05-13 PROCEDURE — 93010 ELECTROCARDIOGRAM REPORT: CPT

## 2021-05-13 PROCEDURE — 99152 MOD SED SAME PHYS/QHP 5/>YRS: CPT

## 2021-05-13 PROCEDURE — 93571 IV DOP VEL&/PRESS C FLO 1ST: CPT | Mod: 26,LD

## 2021-05-13 PROCEDURE — 92928 PRQ TCAT PLMT NTRAC ST 1 LES: CPT | Mod: LD

## 2021-05-13 RX ORDER — ACETAMINOPHEN 500 MG
650 TABLET ORAL EVERY 6 HOURS
Refills: 0 | Status: DISCONTINUED | OUTPATIENT
Start: 2021-05-13 | End: 2021-05-15

## 2021-05-13 RX ORDER — CLOPIDOGREL BISULFATE 75 MG/1
600 TABLET, FILM COATED ORAL ONCE
Refills: 0 | Status: COMPLETED | OUTPATIENT
Start: 2021-05-13 | End: 2021-05-13

## 2021-05-13 RX ORDER — CLOPIDOGREL BISULFATE 75 MG/1
75 TABLET, FILM COATED ORAL DAILY
Refills: 0 | Status: DISCONTINUED | OUTPATIENT
Start: 2021-05-14 | End: 2021-05-15

## 2021-05-13 RX ADMIN — CLOPIDOGREL BISULFATE 600 MILLIGRAM(S): 75 TABLET, FILM COATED ORAL at 12:18

## 2021-05-13 RX ADMIN — Medication 100 MILLIGRAM(S): at 05:47

## 2021-05-13 RX ADMIN — Medication 100 MILLIGRAM(S): at 21:21

## 2021-05-13 RX ADMIN — CARVEDILOL PHOSPHATE 25 MILLIGRAM(S): 80 CAPSULE, EXTENDED RELEASE ORAL at 05:47

## 2021-05-13 RX ADMIN — ATORVASTATIN CALCIUM 80 MILLIGRAM(S): 80 TABLET, FILM COATED ORAL at 21:21

## 2021-05-13 RX ADMIN — HEPARIN SODIUM 1250 UNIT(S)/HR: 5000 INJECTION INTRAVENOUS; SUBCUTANEOUS at 02:38

## 2021-05-13 RX ADMIN — ISOSORBIDE MONONITRATE 90 MILLIGRAM(S): 60 TABLET, EXTENDED RELEASE ORAL at 12:18

## 2021-05-13 RX ADMIN — AMLODIPINE BESYLATE 10 MILLIGRAM(S): 2.5 TABLET ORAL at 05:47

## 2021-05-13 RX ADMIN — Medication 81 MILLIGRAM(S): at 05:50

## 2021-05-13 RX ADMIN — HEPARIN SODIUM 1250 UNIT(S)/HR: 5000 INJECTION INTRAVENOUS; SUBCUTANEOUS at 09:39

## 2021-05-13 RX ADMIN — Medication 650 MILLIGRAM(S): at 23:47

## 2021-05-13 RX ADMIN — TAMSULOSIN HYDROCHLORIDE 0.4 MILLIGRAM(S): 0.4 CAPSULE ORAL at 21:21

## 2021-05-13 RX ADMIN — BUDESONIDE AND FORMOTEROL FUMARATE DIHYDRATE 2 PUFF(S): 160; 4.5 AEROSOL RESPIRATORY (INHALATION) at 21:24

## 2021-05-13 NOTE — PROGRESS NOTE ADULT - SUBJECTIVE AND OBJECTIVE BOX
plan for PCI today  renally stable       Meds:  acetaminophen   Tablet .. 650 every 6 hours PRN  allopurinol 100 two times a day  amLODIPine   Tablet 10 daily  aspirin  chewable 81 daily  atorvastatin 80 at bedtime  budesonide  80 MICROgram(s)/formoterol 4.5 MICROgram(s) Inhaler 2 two times a day  carvedilol 25 every 12 hours  chlorhexidine 4% Liquid 1 once  dextrose 40% Gel 15 once  dextrose 5%. 1000 <Continuous>  dextrose 5%. 1000 <Continuous>  dextrose 50% Injectable 25 once  dextrose 50% Injectable 12.5 once  dextrose 50% Injectable 25 once  glucagon  Injectable 1 once  guaiFENesin Oral Liquid (Sugar-Free) 200 every 6 hours PRN  heparin   Injectable 5300 every 6 hours PRN  heparin  Infusion. 1600 <Continuous>  hydrALAZINE 100 every 8 hours  insulin lispro (ADMELOG) corrective regimen sliding scale  Before meals and at bedtime  isosorbide   mononitrate ER Tablet (IMDUR) 90 daily  tamsulosin 0.4 at bedtime      T(C): , Max: 36.9 (05-12-21 @ 22:03)  T(F): , Max: 98.4 (05-12-21 @ 22:03)  HR: 74 (05-13-21 @ 08:15)  BP: 116/67 (05-13-21 @ 08:15)  BP(mean): 78 (05-13-21 @ 08:15)  RR: 18 (05-13-21 @ 08:15)  SpO2: 98% (05-13-21 @ 08:15)  Wt(kg): --    05-12 @ 07:01  -  05-13 @ 07:00  --------------------------------------------------------  IN: 180 mL / OUT: 2175 mL / NET: -1995 mL    05-13 @ 07:01  -  05-13 @ 13:36  --------------------------------------------------------  IN: 0 mL / OUT: 0 mL / NET: 0 mL    REVIEW OF SYSTEMS:  Gen: No fever  CVS: No chest pain  Resp: No shortness of breath  Abd: No nausea/ No vomiting/No abdominal pain  CNS: No headache    PHYSICAL EXAM:  GENERAL: alert, no acute distress at present, on RA sitting in chair comfortably   CHEST/LUNG: decreased breath sounds bilaterally  HEART: normal S1S2, RRR  ABDOMEN: Soft  EXTREMITIES: No LE edema bilateral    LABS:                        9.1    9.32  )-----------( 159      ( 13 May 2021 06:17 )             29.9     05-13    138  |  102  |  40<H>  ----------------------------<  100<H>  4.3   |  27  |  2.93<H>    Ca    9.0      13 May 2021 06:17  Mg     2.3     05-13        PTT - ( 13 May 2021 08:04 )  PTT:72.7 sec          RADIOLOGY & ADDITIONAL STUDIES:

## 2021-05-13 NOTE — PROGRESS NOTE ADULT - PROBLEM SELECTOR PLAN 2
--Euvolemic on exam; SpO2 95% RA; net neg 16.2L overall.    --TTE (5/5/21): LVEF 25-30% w/ mod-severe hypokinesis except for basal/mid anteroseptum. (full report available).    --HOLDING further diuresis at this time given cath.    --CONT: Coreg 25mg PO BID.    --HOME DIURETIC: Torsemide 20mg PO QD.

## 2021-05-13 NOTE — PROGRESS NOTE ADULT - PROBLEM SELECTOR PLAN 1
--R/I NSTEMI w/ Trop T peak 0.16 and TWI inferolateral leads of EKG.    --s/p CARDIAC CATH (5/10/21): 3VCAD, diagnostic; mRCA , mLCx , mLAD 80%, LM normal.    --Cardiac Viability (5/12/21): small area mild reversibility/viability apex; no viability inferior and basal inferolateral walls. (full report available).    --PLAN: staged PCI of LAD w/ Dr. Pires 5/13/21; no pre-cath fluids; re-loaded w/ Plavix 600mg PO x1 prior to cath; continue heparin gtt, ASA 81mg PO QD, Coreg 25mg PO BID, Atorvastatin 80mg PO QHS.

## 2021-05-13 NOTE — PROGRESS NOTE ADULT - ASSESSMENT
69yo non-compliant Male, former smoker, with PMHx of uncontrolled HTN, HLD, DM-II, CAD, PAD (s/p stents in L leg, per pt), sCHF (EF 25-30%), COPD (on home O2 PRN) and CKD (baseline Cr 3) who presented c/o SOB, found to be in acute hypoxic respiratory failure requiring BIPAP in setting of HTN emergency with flash pulm edema, 2/2 medication non compliance. Pt R/I NSTEMI with peak Trop 0.16 and TWI in inferolaterals leads and found to be in new Afib, now s/p self conversion to NSR. Hospital course significant for uptrending leukocytosis and completed IV abx for suspected CAP on 5/12. Pt now euvolemic, off diuretics and s/p cardiac cath 5/10 revealing 3vCAD. Cardiac viability scan reveals minimal viable tissue. Plan for staged PCI of LAD 5/13 with Dr. Pires.     69yo non-compliant Male, former smoker, with PMHx of uncontrolled HTN, HLD, DM-II, CAD, PAD (s/p stents in L leg, per pt), sCHF (EF 25-30%), COPD (on home O2 PRN) and CKD (baseline Cr 3) who presented c/o SOB, found to be in acute hypoxic respiratory failure requiring BIPAP in setting of HTN emergency with flash pulm edema, 2/2 medication non compliance. Pt R/I NSTEMI with peak Trop 0.16 and TWI in inferolaterals leads and found to be in new Afib, now s/p self conversion to NSR. Hospital course significant for uptrending leukocytosis and completed IV abx for suspected CAP on 5/12. Pt now euvolemic, off diuretics and s/p cardiac cath 5/10 revealing 3vCAD. Cardiac viability scan reveals minimal viable tissue. Plan for staged PCI of LAD today 5/13 with Dr. Pires.

## 2021-05-13 NOTE — PROGRESS NOTE ADULT - PROBLEM SELECTOR PLAN 4
--Hosp course c/b uptrending leukocytosis w/ opacities on CXR.    --s/p completion of Azithromycin x5 days and IV Ceftriaxone x7days.

## 2021-05-13 NOTE — PROGRESS NOTE ADULT - ATTENDING COMMENTS
have reviewed above with staff, and have discussed the problems with pt in detail.   agree with findings above and with plans.

## 2021-05-13 NOTE — PROGRESS NOTE ADULT - PROBLEM SELECTOR PLAN 5
--Non-oliguric CKD IV, baseline Cr 3; renal following;   --Cr 3.3 at peak; 2.93 on 5/13/21.    --Renal dose, avoid nephrotoxic agents and monitor UOP.

## 2021-05-13 NOTE — PROGRESS NOTE ADULT - PROBLEM SELECTOR PLAN 9
--CONT: Tamsulosin 0.4mg PO QD         F: None   E: Replete if K<4 or Mag<2   N: DASH Diet with 1L fluid restriction   VTEppx: Heparin gtt   Dispo: pending staged PCI 5/13

## 2021-05-13 NOTE — PROGRESS NOTE ADULT - ATTENDING COMMENTS
Initial attending contact date 5/6/21     . See PA note written above for details. I reviewed the PA documentation. I have personally seen and examined this patient. I reviewed vitals, labs, medications, cardiac studies, and additional imaging. I agree with the above PA's findings and plans as written above with the following additions/statements.    -Pt is 69yo non-compliant (reports forgetting to take meds) M w/ PMHx of HLD, DM T2, uncontrolled HTN, CAD (questionable Hx of stents? pt denies but outpatient may have more info??), PAD (pt reporting stents in L lower extremity), HFrEF (EF 23% in 2017), COPD (pt w/ home O2 via NC PRN), CKD (unknown Cr baseline) admitted with HTN emergency ( due to med non compliance) in setting of acute on chronic systolic HF/NSTEMI/ new onset A fib  -HTN emergency: Initially on nitro drip, now tapered off, BP better controlled on imdur 90 mg po qd. Cont hydralazine 100 mg po tid, coreg 25 gm po bid, and amlodipine 10 mg po qd   -NSTEMI: with chest tightness in setting of HTN emergency with ischemic T wave inversion v4-v6. Pk trop .16 now trending down. Cont ASA, plavix, coreg, atorva 80.  -s/p CATH 5/10: mRCA 100% occluded w/o collaterals, mCx 100% occluded, mLAD 80%. ECHO reviewed : EF 25 with severely hypokinetic walls, anteroseptum moderately hypokinetic.   -Viability study: no viability inferior and inferiorlateral walls, apical viability noted  -Given above findings, plan for PCI LAD 5/13  -CRI: Crt stable (baseline 3)  -New onset A fib : now in NSR. Cont IV heparin  -Acute on chronic systolic HF: SOB improved. - 15 L since admission. Hold diuretics given cath 5/12  -PT eval- no skilled needs  -Likely DC 5/14 pending cath and Crt post cath

## 2021-05-13 NOTE — PROGRESS NOTE ADULT - SUBJECTIVE AND OBJECTIVE BOX
*** INCOMPLETE / IN PROGRESS NOTE ***    Interventional Cardiology PA Adult Progress Note    Subjective Assessment:  	  MEDICATIONS:  amLODIPine   Tablet 10 milliGRAM(s) Oral daily  carvedilol 25 milliGRAM(s) Oral every 12 hours  hydrALAZINE 100 milliGRAM(s) Oral every 8 hours  isosorbide   mononitrate ER Tablet (IMDUR) 90 milliGRAM(s) Oral daily  tamsulosin 0.4 milliGRAM(s) Oral at bedtime  budesonide  80 MICROgram(s)/formoterol 4.5 MICROgram(s) Inhaler 2 Puff(s) Inhalation two times a day  guaiFENesin Oral Liquid (Sugar-Free) 200 milliGRAM(s) Oral every 6 hours PRN  acetaminophen   Tablet .. 650 milliGRAM(s) Oral every 6 hours PRN  allopurinol 100 milliGRAM(s) Oral two times a day  atorvastatin 80 milliGRAM(s) Oral at bedtime  dextrose 40% Gel 15 Gram(s) Oral once  dextrose 50% Injectable 25 Gram(s) IV Push once  dextrose 50% Injectable 12.5 Gram(s) IV Push once  dextrose 50% Injectable 25 Gram(s) IV Push once  glucagon  Injectable 1 milliGRAM(s) IntraMuscular once  insulin lispro (ADMELOG) corrective regimen sliding scale   SubCutaneous Before meals and at bedtime  aspirin  chewable 81 milliGRAM(s) Oral daily  chlorhexidine 4% Liquid 1 Application(s) Topical once  clopidogrel Tablet 600 milliGRAM(s) Oral once  dextrose 5%. 1000 milliLiter(s) IV Continuous <Continuous>  dextrose 5%. 1000 milliLiter(s) IV Continuous <Continuous>  heparin   Injectable 5300 Unit(s) IV Push every 6 hours PRN  heparin  Infusion. 1600 Unit(s)/Hr IV Continuous <Continuous>      PHYSICAL EXAM:  TELEMETRY:  T(C): 36.9 (05-13-21 @ 10:54), Max: 36.9 (05-12-21 @ 22:03)  HR: 74 (05-13-21 @ 08:15) (69 - 74)  BP: 116/67 (05-13-21 @ 08:15) (116/67 - 159/79)  RR: 18 (05-13-21 @ 08:15) (18 - 18)  SpO2: 98% (05-13-21 @ 08:15) (96% - 98%)  Wt(kg): --  I&O's Summary    12 May 2021 07:01  -  13 May 2021 07:00  --------------------------------------------------------  IN: 180 mL / OUT: 2175 mL / NET: -1995 mL    13 May 2021 07:01  -  13 May 2021 11:22  --------------------------------------------------------  IN: 0 mL / OUT: 0 mL / NET: 0 mL        Ross:  Central/PICC/Mid Line:                                         Appearance: Normal	  HEENT:   Normal oral mucosa, PERRL, EOMI	  Neck: Supple, + JVD/ - JVD; Carotid Bruit   Cardiovascular: Normal S1 S2, No JVD, No murmurs,   Respiratory: Lungs clear to auscultation/Decreased Breath Sounds/No Rales, Rhonchi, Wheezing	  Gastrointestinal:  Soft, Non-tender, + BS	  Skin: No rashes, No ecchymoses, No cyanosis  Extremities: Normal range of motion, No clubbing, cyanosis or edema  Vascular: Peripheral pulses palpable 2+ bilaterally  Neurologic: Non-focal  Psychiatry: A & O x 3, Mood & affect appropriate      	    ECG:  	  RADIOLOGY:   DIAGNOSTIC TESTING:  [ ] Echocardiogram:  [ ]  Catheterization:  [ ] Stress Test:    [ ] MARSHALL  OTHER: 	    LABS:	 	  CARDIAC MARKERS:             9.1    9.32  )-----------( 159      ( 13 May 2021 06:17 )             29.9     138  |  102  |  40<H>  ----------------------------<  100<H>  4.3   |  27  |  2.93<H>    Ca    9.0      13 May 2021 06:17  Mg     2.3     05-13  PTT - ( 13 May 2021 08:04 )  PTT:72.7 sec         Cardiology PA Adult Progress Note    Subjective Assessment: Pt seen and evaluated at bedside. Denies symptoms at this time and reports feeling good. Denies SOB, CP, palpitations, dizziness, urianry symptoms, URI symptoms. No events overnight.   	  MEDICATIONS:  amLODIPine   Tablet 10 milliGRAM(s) Oral daily  carvedilol 25 milliGRAM(s) Oral every 12 hours  hydrALAZINE 100 milliGRAM(s) Oral every 8 hours  isosorbide   mononitrate ER Tablet (IMDUR) 90 milliGRAM(s) Oral daily  tamsulosin 0.4 milliGRAM(s) Oral at bedtime  budesonide  80 MICROgram(s)/formoterol 4.5 MICROgram(s) Inhaler 2 Puff(s) Inhalation two times a day  guaiFENesin Oral Liquid (Sugar-Free) 200 milliGRAM(s) Oral every 6 hours PRN  acetaminophen   Tablet .. 650 milliGRAM(s) Oral every 6 hours PRN  allopurinol 100 milliGRAM(s) Oral two times a day  atorvastatin 80 milliGRAM(s) Oral at bedtime  dextrose 40% Gel 15 Gram(s) Oral once  dextrose 50% Injectable 25 Gram(s) IV Push once  dextrose 50% Injectable 12.5 Gram(s) IV Push once  dextrose 50% Injectable 25 Gram(s) IV Push once  glucagon  Injectable 1 milliGRAM(s) IntraMuscular once  insulin lispro (ADMELOG) corrective regimen sliding scale   SubCutaneous Before meals and at bedtime  aspirin  chewable 81 milliGRAM(s) Oral daily  chlorhexidine 4% Liquid 1 Application(s) Topical once  clopidogrel Tablet 600 milliGRAM(s) Oral once  dextrose 5%. 1000 milliLiter(s) IV Continuous <Continuous>  dextrose 5%. 1000 milliLiter(s) IV Continuous <Continuous>  heparin   Injectable 5300 Unit(s) IV Push every 6 hours PRN  heparin  Infusion. 1600 Unit(s)/Hr IV Continuous <Continuous>      PHYSICAL EXAM:  TELEMETRY:  T(C): 36.9 (05-13-21 @ 10:54), Max: 36.9 (05-12-21 @ 22:03)  HR: 74 (05-13-21 @ 08:15) (69 - 74)  BP: 116/67 (05-13-21 @ 08:15) (116/67 - 159/79)  RR: 18 (05-13-21 @ 08:15) (18 - 18)  SpO2: 98% (05-13-21 @ 08:15) (96% - 98%)  Wt(kg): --  I&O's Summary    12 May 2021 07:01  -  13 May 2021 07:00  --------------------------------------------------------  IN: 180 mL / OUT: 2175 mL / NET: -1995 mL    13 May 2021 07:01  -  13 May 2021 11:22  --------------------------------------------------------  IN: 0 mL / OUT: 0 mL / NET: 0 mL      PHYSICAL EXAM:  GEN: NAD  HEENT: No JVD  PULM: Faint crakcles left lower lung, no WRR  CARD:  RRR, S1 and S2, no murmrus  ABD: +BS, NT, soft/ND	  EXT: No Edema B/L LE, distal pulses intact   NEURO: A+Ox3, no focal deficit  PSYCH: Mood Appropriate    LABS:	 	  CARDIAC MARKERS:             9.1    9.32  )-----------( 159      ( 13 May 2021 06:17 )             29.9     138  |  102  |  40<H>  ----------------------------<  100<H>  4.3   |  27  |  2.93<H>    Ca    9.0      13 May 2021 06:17  Mg     2.3     05-13  PTT - ( 13 May 2021 08:04 )  PTT:72.7 sec

## 2021-05-13 NOTE — PROGRESS NOTE ADULT - ASSESSMENT
Nephrology consulted for CKD stage 4 in 71yo M who admitted with hypertensive emergency, heart failure exacerbation, now s/p cardiac cath 5/10 revealing 3vCAD. Cardiac viability scan reveals minimal viable tissue. Plan for staged PCI of LAD 5/13.    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

## 2021-05-14 PROBLEM — Z00.00 ENCOUNTER FOR PREVENTIVE HEALTH EXAMINATION: Status: ACTIVE | Noted: 2021-05-14

## 2021-05-14 LAB
ANION GAP SERPL CALC-SCNC: 10 MMOL/L — SIGNIFICANT CHANGE UP (ref 5–17)
BUN SERPL-MCNC: 44 MG/DL — HIGH (ref 7–23)
CALCIUM SERPL-MCNC: 8.9 MG/DL — SIGNIFICANT CHANGE UP (ref 8.4–10.5)
CHLORIDE SERPL-SCNC: 102 MMOL/L — SIGNIFICANT CHANGE UP (ref 96–108)
CO2 SERPL-SCNC: 26 MMOL/L — SIGNIFICANT CHANGE UP (ref 22–31)
CREAT SERPL-MCNC: 3.28 MG/DL — HIGH (ref 0.5–1.3)
GLUCOSE SERPL-MCNC: 103 MG/DL — HIGH (ref 70–99)
HCT VFR BLD CALC: 29.4 % — LOW (ref 39–50)
HGB BLD-MCNC: 8.9 G/DL — LOW (ref 13–17)
MAGNESIUM SERPL-MCNC: 2.2 MG/DL — SIGNIFICANT CHANGE UP (ref 1.6–2.6)
MCHC RBC-ENTMCNC: 27.7 PG — SIGNIFICANT CHANGE UP (ref 27–34)
MCHC RBC-ENTMCNC: 30.3 GM/DL — LOW (ref 32–36)
MCV RBC AUTO: 91.6 FL — SIGNIFICANT CHANGE UP (ref 80–100)
NRBC # BLD: 0 /100 WBCS — SIGNIFICANT CHANGE UP (ref 0–0)
PHOSPHATE SERPL-MCNC: 4.5 MG/DL — SIGNIFICANT CHANGE UP (ref 2.5–4.5)
PLATELET # BLD AUTO: 154 K/UL — SIGNIFICANT CHANGE UP (ref 150–400)
POTASSIUM SERPL-MCNC: 4.6 MMOL/L — SIGNIFICANT CHANGE UP (ref 3.5–5.3)
POTASSIUM SERPL-SCNC: 4.6 MMOL/L — SIGNIFICANT CHANGE UP (ref 3.5–5.3)
RBC # BLD: 3.21 M/UL — LOW (ref 4.2–5.8)
RBC # FLD: 14.2 % — SIGNIFICANT CHANGE UP (ref 10.3–14.5)
SODIUM SERPL-SCNC: 138 MMOL/L — SIGNIFICANT CHANGE UP (ref 135–145)
WBC # BLD: 8.32 K/UL — SIGNIFICANT CHANGE UP (ref 3.8–10.5)
WBC # FLD AUTO: 8.32 K/UL — SIGNIFICANT CHANGE UP (ref 3.8–10.5)

## 2021-05-14 PROCEDURE — 99233 SBSQ HOSP IP/OBS HIGH 50: CPT

## 2021-05-14 RX ORDER — ISOSORBIDE MONONITRATE 60 MG/1
1 TABLET, EXTENDED RELEASE ORAL
Qty: 0 | Refills: 0 | DISCHARGE

## 2021-05-14 RX ORDER — ASPIRIN/CALCIUM CARB/MAGNESIUM 324 MG
1 TABLET ORAL
Qty: 30 | Refills: 11
Start: 2021-05-14 | End: 2022-05-08

## 2021-05-14 RX ORDER — ATORVASTATIN CALCIUM 80 MG/1
1 TABLET, FILM COATED ORAL
Qty: 30 | Refills: 3
Start: 2021-05-14 | End: 2021-09-10

## 2021-05-14 RX ORDER — AMLODIPINE BESYLATE 2.5 MG/1
1 TABLET ORAL
Qty: 0 | Refills: 0 | DISCHARGE

## 2021-05-14 RX ORDER — ISOSORBIDE MONONITRATE 60 MG/1
1 TABLET, EXTENDED RELEASE ORAL
Qty: 30 | Refills: 3
Start: 2021-05-14 | End: 2021-09-10

## 2021-05-14 RX ORDER — HYDRALAZINE HCL 50 MG
1 TABLET ORAL
Qty: 0 | Refills: 0 | DISCHARGE

## 2021-05-14 RX ORDER — APIXABAN 2.5 MG/1
1 TABLET, FILM COATED ORAL
Qty: 60 | Refills: 3
Start: 2021-05-14 | End: 2021-09-10

## 2021-05-14 RX ORDER — APIXABAN 2.5 MG/1
5 TABLET, FILM COATED ORAL EVERY 12 HOURS
Refills: 0 | Status: DISCONTINUED | OUTPATIENT
Start: 2021-05-14 | End: 2021-05-15

## 2021-05-14 RX ORDER — CILOSTAZOL 100 MG/1
1 TABLET ORAL
Qty: 0 | Refills: 0 | DISCHARGE

## 2021-05-14 RX ORDER — CARVEDILOL PHOSPHATE 80 MG/1
1 CAPSULE, EXTENDED RELEASE ORAL
Qty: 60 | Refills: 3
Start: 2021-05-14 | End: 2021-09-10

## 2021-05-14 RX ORDER — ASPIRIN/CALCIUM CARB/MAGNESIUM 324 MG
1 TABLET ORAL
Qty: 0 | Refills: 0 | DISCHARGE

## 2021-05-14 RX ORDER — ISOSORBIDE MONONITRATE 60 MG/1
120 TABLET, EXTENDED RELEASE ORAL DAILY
Refills: 0 | Status: DISCONTINUED | OUTPATIENT
Start: 2021-05-14 | End: 2021-05-15

## 2021-05-14 RX ORDER — CARVEDILOL PHOSPHATE 80 MG/1
1 CAPSULE, EXTENDED RELEASE ORAL
Qty: 0 | Refills: 0 | DISCHARGE

## 2021-05-14 RX ORDER — LOSARTAN POTASSIUM 100 MG/1
1 TABLET, FILM COATED ORAL
Qty: 0 | Refills: 0 | DISCHARGE

## 2021-05-14 RX ORDER — ASPIRIN/CALCIUM CARB/MAGNESIUM 324 MG
1 TABLET ORAL
Qty: 14 | Refills: 0
Start: 2021-05-14 | End: 2021-05-27

## 2021-05-14 RX ORDER — CLOPIDOGREL BISULFATE 75 MG/1
1 TABLET, FILM COATED ORAL
Qty: 30 | Refills: 11
Start: 2021-05-14 | End: 2022-05-08

## 2021-05-14 RX ORDER — AMLODIPINE BESYLATE 2.5 MG/1
1 TABLET ORAL
Qty: 30 | Refills: 3
Start: 2021-05-14 | End: 2021-09-10

## 2021-05-14 RX ORDER — HYDRALAZINE HCL 50 MG
1 TABLET ORAL
Qty: 90 | Refills: 3
Start: 2021-05-14 | End: 2021-09-10

## 2021-05-14 RX ORDER — ATORVASTATIN CALCIUM 80 MG/1
1 TABLET, FILM COATED ORAL
Qty: 0 | Refills: 0 | DISCHARGE

## 2021-05-14 RX ADMIN — Medication 100 MILLIGRAM(S): at 22:08

## 2021-05-14 RX ADMIN — ATORVASTATIN CALCIUM 80 MILLIGRAM(S): 80 TABLET, FILM COATED ORAL at 22:08

## 2021-05-14 RX ADMIN — BUDESONIDE AND FORMOTEROL FUMARATE DIHYDRATE 2 PUFF(S): 160; 4.5 AEROSOL RESPIRATORY (INHALATION) at 22:10

## 2021-05-14 RX ADMIN — Medication 4: at 22:08

## 2021-05-14 RX ADMIN — AMLODIPINE BESYLATE 10 MILLIGRAM(S): 2.5 TABLET ORAL at 05:47

## 2021-05-14 RX ADMIN — TAMSULOSIN HYDROCHLORIDE 0.4 MILLIGRAM(S): 0.4 CAPSULE ORAL at 22:08

## 2021-05-14 RX ADMIN — Medication 650 MILLIGRAM(S): at 00:39

## 2021-05-14 RX ADMIN — CLOPIDOGREL BISULFATE 75 MILLIGRAM(S): 75 TABLET, FILM COATED ORAL at 10:44

## 2021-05-14 RX ADMIN — BUDESONIDE AND FORMOTEROL FUMARATE DIHYDRATE 2 PUFF(S): 160; 4.5 AEROSOL RESPIRATORY (INHALATION) at 10:44

## 2021-05-14 RX ADMIN — Medication 100 MILLIGRAM(S): at 05:46

## 2021-05-14 RX ADMIN — ISOSORBIDE MONONITRATE 120 MILLIGRAM(S): 60 TABLET, EXTENDED RELEASE ORAL at 10:44

## 2021-05-14 RX ADMIN — Medication 100 MILLIGRAM(S): at 14:13

## 2021-05-14 RX ADMIN — APIXABAN 5 MILLIGRAM(S): 2.5 TABLET, FILM COATED ORAL at 18:16

## 2021-05-14 RX ADMIN — Medication 100 MILLIGRAM(S): at 18:16

## 2021-05-14 RX ADMIN — CARVEDILOL PHOSPHATE 25 MILLIGRAM(S): 80 CAPSULE, EXTENDED RELEASE ORAL at 05:46

## 2021-05-14 RX ADMIN — Medication 2: at 12:04

## 2021-05-14 RX ADMIN — CARVEDILOL PHOSPHATE 25 MILLIGRAM(S): 80 CAPSULE, EXTENDED RELEASE ORAL at 18:15

## 2021-05-14 RX ADMIN — Medication 81 MILLIGRAM(S): at 10:44

## 2021-05-14 NOTE — PROGRESS NOTE ADULT - PROBLEM SELECTOR PLAN 3
--ON ADMIT /131 in setting of non-compliance.    ---150s now.    --CONT: Imdur 90mg PO QD, Coreg 25mg PO BID, Hydralazine 100mg PO TID, Amlodipine 10mg PO QD.    --HOLD: home Losartan 100mg PO QD and home Clonidine 0.1mg PO BID. --ON ADMIT /131 in setting of non-compliance.     ---170s now.     --CONT: Imdur 120mg PO QD, Coreg 25mg PO BID, Hydralazine 100mg PO TID, Amlodipine 10mg PO QD.     --DISCONTINUED home Losartan and Clonidine.

## 2021-05-14 NOTE — PROGRESS NOTE ADULT - ATTENDING COMMENTS
have discussed with pt, dr stephen, and staff-- pt euvolemic and nonoliguric, but creat up to 3.28.   would favor observation until am, after which , if stable, pt coud be safe for discharge.   discussed with all .

## 2021-05-14 NOTE — PROGRESS NOTE ADULT - PROBLEM SELECTOR PLAN 1
--R/I NSTEMI w/ Trop T peak 0.16 and TWI inferolateral leads of EKG.    --s/p CARDIAC CATH (5/10/21): 3VCAD, diagnostic; mRCA , mLCx , mLAD 80%, LM normal.    --Cardiac Viability (5/12/21): small area mild reversibility/viability apex; no viability inferior and basal inferolateral walls. (full report available).    --PLAN: staged PCI of LAD w/ Dr. Pires 5/13/21; no pre-cath fluids; re-loaded w/ Plavix 600mg PO x1 prior to cath; continue heparin gtt, ASA 81mg PO QD, Coreg 25mg PO BID, Atorvastatin 80mg PO QHS. --R/I NSTEMI w/ Trop T peak 0.16 and TWI inferolateral leads of EKG.     --s/p CARDIAC CATH (5/10/21): 3VCAD, diagnostic; mRCA , mLCx , mLAD 80%, LM normal.     --Cardiac Viability (5/12/21): small area mild reversibility/viability apex; no viability inferior and basal inferolateral walls. (full report available).     --PCI w/ PTCA/ANSLEY x2 p/mLAD on 5/13/21.    --CONT: ASA 81mg PO QD, Plavix 75mg PO QD, Coreg 25mg PO BID, Atorvastatin 80mg PO QD.

## 2021-05-14 NOTE — PROGRESS NOTE ADULT - PROBLEM SELECTOR PLAN 8
--A1c 4.5   --Holding home Cliffuvia and continue mISS and FS. --A1c 4.5    --Holding home Cliffuvia and continue mISS and FS.

## 2021-05-14 NOTE — PROGRESS NOTE ADULT - PROBLEM SELECTOR PLAN 2
--Euvolemic on exam; SpO2 95% RA; net neg 16.2L overall.    --TTE (5/5/21): LVEF 25-30% w/ mod-severe hypokinesis except for basal/mid anteroseptum. (full report available).    --HOLDING further diuresis at this time given cath.    --CONT: Coreg 25mg PO BID.    --HOME DIURETIC: Torsemide 20mg PO QD. --Euvolemic on exam; SpO2 95% RA; net neg 16.2L overall.     --TTE (5/5/21): LVEF 25-30% w/ mod-severe hypokinesis except for basal/mid anteroseptum. (full report available).     --CONT: Torsemide 20mg PO QD on discharge.

## 2021-05-14 NOTE — PROGRESS NOTE ADULT - PROBLEM SELECTOR PLAN 5
--Non-oliguric CKD IV, baseline Cr 3; renal following;   --Cr 3.3 at peak; 2.93 on 5/13/21.    --Renal dose, avoid nephrotoxic agents and monitor UOP. --Non-oliguric CKD IV, baseline Cr 3; renal following;    --Cr 3.3 at peak; 2.93 on 5/13/21.     --3.28 (from 2.93) post cardiac cath – MONITOR BMP TOMORROW BEFORE DISCHARGE.

## 2021-05-14 NOTE — PROGRESS NOTE ADULT - ASSESSMENT
71yo non-compliant Male, former smoker, with PMHx of uncontrolled HTN, HLD, DM-II, CAD, PAD (s/p stents in L leg, per pt), sCHF (EF 25-30%), COPD (on home O2 PRN) and CKD (baseline Cr 3) who presented c/o SOB, found to be in acute hypoxic respiratory failure requiring BIPAP in setting of HTN emergency with flash pulm edema, 2/2 medication non compliance. Pt R/I NSTEMI with peak Trop 0.16 and TWI in inferolaterals leads and found to be in new Afib, now s/p self conversion to NSR. Hospital course significant for uptrending leukocytosis and completed IV abx for suspected CAP on 5/12. Pt now euvolemic, off diuretics and s/p cardiac cath 5/10 revealing 3vCAD. Cardiac viability scan reveals minimal viable tissue. Plan for staged PCI of LAD today 5/13 with Dr. Pires. 71yo non-compliant Male, former smoker, with PMHx of uncontrolled HTN, HLD, DM-II, CAD, PAD (s/p stents in L leg, per pt), sCHF (EF 25-30%), COPD (on home O2 PRN) and CKD (baseline Cr 3) who presented c/o SOB, found to be in acute hypoxic respiratory failure requiring BIPAP in setting of HTN emergency with flash pulm edema, 2/2 medication non compliance. Pt R/I NSTEMI with peak Trop 0.16 and TWI in inferolaterals leads and found to be in new Afib, now s/p self conversion to NSR. Hospital course significant for uptrending leukocytosis and completed IV abx for suspected CAP on 5/12. Pt now euvolemic, off diuretics and s/p cardiac cath 5/10 revealing 3vCAD. Cardiac viability scan reveals minimal viable tissue. PCI to mLAD 5/13/21. Plan for discharge tomorrow 5/14/21 w/ follow up with Dr. Simental (cardiology) and Dr. Richardson (nephrology) - appointments have been made and are in discharge note.

## 2021-05-14 NOTE — PROGRESS NOTE ADULT - SUBJECTIVE AND OBJECTIVE BOX
Patient is a 70y Male seen and evaluated at bedside  s/p PCI 5/13       Meds:  acetaminophen   Tablet .. 650 every 6 hours PRN  allopurinol 100 two times a day  amLODIPine   Tablet 10 daily  aspirin  chewable 81 daily  atorvastatin 80 at bedtime  budesonide  80 MICROgram(s)/formoterol 4.5 MICROgram(s) Inhaler 2 two times a day  carvedilol 25 every 12 hours  clopidogrel Tablet 75 daily  dextrose 40% Gel 15 once  dextrose 5%. 1000 <Continuous>  dextrose 5%. 1000 <Continuous>  dextrose 50% Injectable 25 once  dextrose 50% Injectable 12.5 once  dextrose 50% Injectable 25 once  glucagon  Injectable 1 once  guaiFENesin Oral Liquid (Sugar-Free) 200 every 6 hours PRN  hydrALAZINE 100 every 8 hours  insulin lispro (ADMELOG) corrective regimen sliding scale  Before meals and at bedtime  isosorbide   mononitrate ER Tablet (IMDUR) 120 daily  tamsulosin 0.4 at bedtime      T(C): , Max: 37.4 (05-14-21 @ 06:05)  T(F): , Max: 99.4 (05-14-21 @ 06:05)  HR: 63 (05-14-21 @ 08:25)  BP: 143/69 (05-14-21 @ 08:25)  BP(mean): 104 (05-13-21 @ 12:08)  RR: 18 (05-14-21 @ 08:25)  SpO2: 98% (05-14-21 @ 08:25)  Wt(kg): --    05-13 @ 07:01  -  05-14 @ 07:00  --------------------------------------------------------  IN: 0 mL / OUT: 200 mL / NET: -200 mL    05-14 @ 07:01  -  05-14 @ 11:08  --------------------------------------------------------  IN: 240 mL / OUT: 300 mL / NET: -60 mL    REVIEW OF SYSTEMS:  Gen: No fever  CVS: No chest pain  Resp: No shortness of breath  Abd: No nausea/ No vomiting/No abdominal pain  CNS: No headache    PHYSICAL EXAM:  GENERAL: alert, no acute distress at present, on RA sitting in chair comfortably   CHEST/LUNG: decreased breath sounds bilaterally  HEART: normal S1S2, RRR  ABDOMEN: Soft  EXTREMITIES: No LE edema bilateral    LABS:                        8.9    8.32  )-----------( 154      ( 14 May 2021 07:14 )             29.4     05-14    138  |  102  |  44<H>  ----------------------------<  103<H>  4.6   |  26  |  3.28<H>    Ca    8.9      14 May 2021 07:14  Phos  4.5     05-14  Mg     2.2     05-14        PTT - ( 13 May 2021 08:04 )  PTT:72.7 sec          RADIOLOGY & ADDITIONAL STUDIES:

## 2021-05-14 NOTE — PROGRESS NOTE ADULT - PROBLEM SELECTOR PLAN 9
--CONT: Tamsulosin 0.4mg PO QD         F: None   E: Replete if K<4 or Mag<2   N: DASH Diet with 1L fluid restriction   VTEppx: Heparin gtt   Dispo: pending staged PCI 5/13 --CONT: Tamsulosin 0.4mg PO QD           VTEppx: Eliquis   Dispo: likely discharge tomorrow 5/15/21

## 2021-05-14 NOTE — PROGRESS NOTE ADULT - TIME BILLING
as noted above
See PA note written above, for details. I reviewed the PA documentation.  I have personally seen and examined this patient today. I reviewed vitals, labs, medications, cardiac studies and additional imaging.  I agree with the PA's findings and plans as written above with the following additions/amendments:  70M with HLD, DM T2, uncontrolled HTN, CAD, PAD, Chronic HFrEF (EF 23% in 2017), COPD (on home O2), CKD IV admitted with HTN emergency, acute on chronic systolic, NSTEMI and new onset A fib. Patient titrated off nitro gtt now on oral agents. Ischemic evaluation pending. paroxysmal afib now in NRR, approaching euvolemia on IV diuretics  Plan for:   Lasix down titrated to 40mg IV daily in consult with nephrology  Will dose additional lasix 40mg iv daily on SAT  No diuretics on SUNDAY in anticipation for contrast load monday  LHC for coronary evaluation planned MOnday 5/10  Cont IV heparin while awaiting LHC  Transition to NOAC post cath for AFib CVA risk reduction  DAPT + Atorva 80  Hydralazine 100 TID, Coreg 25 BID, Amlodipine 10 daily  Azithro/CTX x5D for CAP treatment  Case discussed with patient and cardiac care team  Twila Bass M.D.  Cardiology Attending
See PA note written above, for details. I reviewed the PA documentation.  I have personally seen and examined this patient today. I reviewed vitals, labs, medications, cardiac studies and additional imaging.  I agree with the PA's findings and plans as written above with the following additions/amendments:  70M with HLD, DM T2, uncontrolled HTN, CAD, PAD, Chronic HFrEF (EF 23% in 2017), COPD (on home O2), CKD IV admitted with HTN emergency, acute on chronic systolic, NSTEMI and new onset A fib. Patient titrated off nitro gtt now on oral agents. Ischemic evaluation pending. paroxysmal afib now in NSR. Patient euvolemic on exam  Plan for:   s/p Lasix 40mg iv x1 today  No diuretics on SUNDAY in anticipation for contrast load monday  LHC for coronary evaluation planned Monday 5/10  Cont IV heparin while awaiting LHC  Transition to NOAC post cath for AFib CVA risk reduction  DAPT + Atorva 80  Hydralazine 100 TID, Coreg 25 BID, Amlodipine 10 daily, Imdur 30 daily  Azithro/CTX x5D for CAP treatment, check PCL  PT deemed no needs; patient to receive rx for outpatient cardiac rehab upon discharge  Case discussed with patient and cardiac care team  Twila Bass M.D.  Cardiology Attending
as noted above
See PA note written above, for details. I reviewed the PA documentation.  I have personally seen and examined this patient today. I reviewed vitals, labs, medications, cardiac studies and additional imaging.  I agree with the PA's findings and plans as written above with the following additions/amendments:  70M with HLD, DM T2, uncontrolled HTN, CAD, PAD, Chronic HFrEF (EF 23% in 2017), COPD (on home O2), CKD IV admitted with HTN emergency, acute on chronic systolic, NSTEMI and new onset A fib. Patient titrated off nitro gtt now on oral agents. Ischemic evaluation pending. paroxysmal afib now in NSR.   Patient euvolemic on exam today, able to lay supine in bed at zero degrees without orthopnea, no dyspnea, clear lungs, no EVGENY  Plan for:   No diuretics today in anticipation for contrast load 5/10  [facility] for coronary evaluation planned Monday 5/10  Cont IV heparin while awaiting [facility]  Transition to NOAC post cath for AFib CVA risk reduction  DAPT + Atorva 80  Hydralazine 100 TID, Coreg 25 BID, Amlodipine 10 daily, Imdur 30 daily  Azithro/CTX x5D for CAP treatment, check PCL  PT deemed no needs; patient to receive rx for outpatient cardiac rehab upon discharge  NPO pMN tonight for [facility] ischemic evaluation Monday  Case discussed with patient and cardiac care team  Twila Bass M.D.  Cardiology Attending
as noted above
as noted above

## 2021-05-14 NOTE — PROGRESS NOTE ADULT - ATTENDING COMMENTS
See PA note written above for details. I reviewed the PA documentation. I have personally seen and examined this patient. I reviewed vitals, labs, medications, cardiac studies, and additional imaging. I agree with the above PA's findings and plans as written above with the following additions/statements.    -Plan to dc 5/15 if Crt improves

## 2021-05-14 NOTE — PROGRESS NOTE ADULT - PROBLEM SELECTOR PLAN 6
--New onset AFib, s/p self-conversion to NSR.    --CONT: Coreg 25mg PO BID and Heparin gtt.    --Transition to Eliquis post PCI. --New onset AFib, s/p self-conversion to NSR.     --CONT: Coreg 25mg PO BID and Eliquis 5mg PO BID.

## 2021-05-14 NOTE — PROGRESS NOTE ADULT - ASSESSMENT
Nephrology consulted for CKD stage 4 in 69yo M who admitted with hypertensive emergency, heart failure exacerbation, now s/p cardiac cath 5/10 revealing 3vCAD. Cardiac viability scan reveals minimal viable tissue. Plan for staged PCI of LAD 5/13.    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 Cardiac viability scan reveals minimal viable tissue.   s/p staged PCI of LAD 5/13.    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

## 2021-05-14 NOTE — PROGRESS NOTE ADULT - SUBJECTIVE AND OBJECTIVE BOX
Interventional Cardiology PA Adult Progress Note    Subjective Assessment: Pt seen and evaluated at bedside. Feels well this morning. No events overnight.   	  MEDICATIONS:  amLODIPine   Tablet 10 milliGRAM(s) Oral daily  carvedilol 25 milliGRAM(s) Oral every 12 hours  hydrALAZINE 100 milliGRAM(s) Oral every 8 hours  isosorbide   mononitrate ER Tablet (IMDUR) 120 milliGRAM(s) Oral daily  tamsulosin 0.4 milliGRAM(s) Oral at bedtime  budesonide  80 MICROgram(s)/formoterol 4.5 MICROgram(s) Inhaler 2 Puff(s) Inhalation two times a day  guaiFENesin Oral Liquid (Sugar-Free) 200 milliGRAM(s) Oral every 6 hours PRN  acetaminophen   Tablet .. 650 milliGRAM(s) Oral every 6 hours PRN  allopurinol 100 milliGRAM(s) Oral two times a day  atorvastatin 80 milliGRAM(s) Oral at bedtime  dextrose 40% Gel 15 Gram(s) Oral once  dextrose 50% Injectable 25 Gram(s) IV Push once  dextrose 50% Injectable 12.5 Gram(s) IV Push once  dextrose 50% Injectable 25 Gram(s) IV Push once  glucagon  Injectable 1 milliGRAM(s) IntraMuscular once  insulin lispro (ADMELOG) corrective regimen sliding scale   SubCutaneous Before meals and at bedtime  apixaban 5 milliGRAM(s) Oral every 12 hours  aspirin  chewable 81 milliGRAM(s) Oral daily  clopidogrel Tablet 75 milliGRAM(s) Oral daily  dextrose 5%. 1000 milliLiter(s) IV Continuous <Continuous>  dextrose 5%. 1000 milliLiter(s) IV Continuous <Continuous>      PHYSICAL EXAM:  TELEMETRY:  T(C): 36.9 (05-14-21 @ 18:46), Max: 37.4 (05-14-21 @ 06:05)  HR: 68 (05-14-21 @ 12:04) (63 - 84)  BP: 136/61 (05-14-21 @ 12:04) (136/61 - 181/90)  RR: 17 (05-14-21 @ 12:04) (17 - 18)  SpO2: 97% (05-14-21 @ 12:04) (95% - 98%)  Wt(kg): --  I&O's Summary    13 May 2021 07:01  -  14 May 2021 07:00  --------------------------------------------------------  IN: 0 mL / OUT: 200 mL / NET: -200 mL    14 May 2021 07:01  -  14 May 2021 19:02  --------------------------------------------------------  IN: 240 mL / OUT: 300 mL / NET: -60 mL    GEN: NAD  HEENT: No JVD  PULM: lungs CTA throughout  CARD:  RRR, S1 and S2, no murmrus  ABD: +BS, NT, soft/ND	  EXT: No Edema B/L LE, distal pulses intact   NEURO: A+Ox3, no focal deficit  PSYCH: Mood Appropriate    LABS:	 	  CARDIAC MARKERS:                        8.9    8.32  )-----------( 154      ( 14 May 2021 07:14 )             29.4     138  |  102  |  44<H>  ----------------------------<  103<H>  4.6   |  26  |  3.28<H>    Ca    8.9      14 May 2021 07:14  Phos  4.5     05-14  Mg     2.2     05-14  PTT - ( 13 May 2021 08:04 )  PTT:72.7 sec

## 2021-05-14 NOTE — PROGRESS NOTE ADULT - PROBLEM SELECTOR PLAN 4
--Hosp course c/b uptrending leukocytosis w/ opacities on CXR.    --s/p completion of Azithromycin x5 days and IV Ceftriaxone x7days. --Hosp course c/b uptrending leukocytosis w/ opacities on CXR.     --s/p completion of Azithromycin x5 days and IV Ceftriaxone x7days.

## 2021-05-15 ENCOUNTER — TRANSCRIPTION ENCOUNTER (OUTPATIENT)
Age: 71
End: 2021-05-15

## 2021-05-15 VITALS
OXYGEN SATURATION: 98 % | HEART RATE: 66 BPM | SYSTOLIC BLOOD PRESSURE: 150 MMHG | DIASTOLIC BLOOD PRESSURE: 81 MMHG | RESPIRATION RATE: 18 BRPM

## 2021-05-15 LAB
ANION GAP SERPL CALC-SCNC: 10 MMOL/L — SIGNIFICANT CHANGE UP (ref 5–17)
BUN SERPL-MCNC: 46 MG/DL — HIGH (ref 7–23)
CALCIUM SERPL-MCNC: 8.9 MG/DL — SIGNIFICANT CHANGE UP (ref 8.4–10.5)
CHLORIDE SERPL-SCNC: 105 MMOL/L — SIGNIFICANT CHANGE UP (ref 96–108)
CO2 SERPL-SCNC: 23 MMOL/L — SIGNIFICANT CHANGE UP (ref 22–31)
CREAT SERPL-MCNC: 3.12 MG/DL — HIGH (ref 0.5–1.3)
GLUCOSE SERPL-MCNC: 91 MG/DL — SIGNIFICANT CHANGE UP (ref 70–99)
HCT VFR BLD CALC: 30.2 % — LOW (ref 39–50)
HGB BLD-MCNC: 9.1 G/DL — LOW (ref 13–17)
MAGNESIUM SERPL-MCNC: 2.3 MG/DL — SIGNIFICANT CHANGE UP (ref 1.6–2.6)
MCHC RBC-ENTMCNC: 27.3 PG — SIGNIFICANT CHANGE UP (ref 27–34)
MCHC RBC-ENTMCNC: 30.1 GM/DL — LOW (ref 32–36)
MCV RBC AUTO: 90.7 FL — SIGNIFICANT CHANGE UP (ref 80–100)
NRBC # BLD: 0 /100 WBCS — SIGNIFICANT CHANGE UP (ref 0–0)
PLATELET # BLD AUTO: 158 K/UL — SIGNIFICANT CHANGE UP (ref 150–400)
POTASSIUM SERPL-MCNC: 4.9 MMOL/L — SIGNIFICANT CHANGE UP (ref 3.5–5.3)
POTASSIUM SERPL-SCNC: 4.9 MMOL/L — SIGNIFICANT CHANGE UP (ref 3.5–5.3)
RBC # BLD: 3.33 M/UL — LOW (ref 4.2–5.8)
RBC # FLD: 14.3 % — SIGNIFICANT CHANGE UP (ref 10.3–14.5)
SODIUM SERPL-SCNC: 138 MMOL/L — SIGNIFICANT CHANGE UP (ref 135–145)
WBC # BLD: 9.19 K/UL — SIGNIFICANT CHANGE UP (ref 3.8–10.5)
WBC # FLD AUTO: 9.19 K/UL — SIGNIFICANT CHANGE UP (ref 3.8–10.5)

## 2021-05-15 PROCEDURE — 82962 GLUCOSE BLOOD TEST: CPT

## 2021-05-15 PROCEDURE — 82550 ASSAY OF CK (CPK): CPT

## 2021-05-15 PROCEDURE — 87086 URINE CULTURE/COLONY COUNT: CPT

## 2021-05-15 PROCEDURE — 81001 URINALYSIS AUTO W/SCOPE: CPT

## 2021-05-15 PROCEDURE — C1760: CPT

## 2021-05-15 PROCEDURE — 76775 US EXAM ABDO BACK WALL LIM: CPT

## 2021-05-15 PROCEDURE — 80053 COMPREHEN METABOLIC PANEL: CPT

## 2021-05-15 PROCEDURE — 83880 ASSAY OF NATRIURETIC PEPTIDE: CPT

## 2021-05-15 PROCEDURE — 82728 ASSAY OF FERRITIN: CPT

## 2021-05-15 PROCEDURE — 86769 SARS-COV-2 COVID-19 ANTIBODY: CPT

## 2021-05-15 PROCEDURE — 82553 CREATINE MB FRACTION: CPT

## 2021-05-15 PROCEDURE — 83935 ASSAY OF URINE OSMOLALITY: CPT

## 2021-05-15 PROCEDURE — 84145 PROCALCITONIN (PCT): CPT

## 2021-05-15 PROCEDURE — 86850 RBC ANTIBODY SCREEN: CPT

## 2021-05-15 PROCEDURE — 85025 COMPLETE CBC W/AUTO DIFF WBC: CPT

## 2021-05-15 PROCEDURE — 84105 ASSAY OF URINE PHOSPHORUS: CPT

## 2021-05-15 PROCEDURE — 36415 COLL VENOUS BLD VENIPUNCTURE: CPT

## 2021-05-15 PROCEDURE — 93306 TTE W/DOPPLER COMPLETE: CPT

## 2021-05-15 PROCEDURE — 94660 CPAP INITIATION&MGMT: CPT

## 2021-05-15 PROCEDURE — 83550 IRON BINDING TEST: CPT

## 2021-05-15 PROCEDURE — 80048 BASIC METABOLIC PNL TOTAL CA: CPT

## 2021-05-15 PROCEDURE — C1874: CPT

## 2021-05-15 PROCEDURE — C1769: CPT

## 2021-05-15 PROCEDURE — 86901 BLOOD TYPING SEROLOGIC RH(D): CPT

## 2021-05-15 PROCEDURE — 83970 ASSAY OF PARATHORMONE: CPT

## 2021-05-15 PROCEDURE — 99285 EMERGENCY DEPT VISIT HI MDM: CPT | Mod: 25

## 2021-05-15 PROCEDURE — C1887: CPT

## 2021-05-15 PROCEDURE — C1889: CPT

## 2021-05-15 PROCEDURE — 93005 ELECTROCARDIOGRAM TRACING: CPT

## 2021-05-15 PROCEDURE — 86140 C-REACTIVE PROTEIN: CPT

## 2021-05-15 PROCEDURE — 83036 HEMOGLOBIN GLYCOSYLATED A1C: CPT

## 2021-05-15 PROCEDURE — 85027 COMPLETE CBC AUTOMATED: CPT

## 2021-05-15 PROCEDURE — 99232 SBSQ HOSP IP/OBS MODERATE 35: CPT

## 2021-05-15 PROCEDURE — 78452 HT MUSCLE IMAGE SPECT MULT: CPT

## 2021-05-15 PROCEDURE — 83605 ASSAY OF LACTIC ACID: CPT

## 2021-05-15 PROCEDURE — U0005: CPT

## 2021-05-15 PROCEDURE — 82570 ASSAY OF URINE CREATININE: CPT

## 2021-05-15 PROCEDURE — 82803 BLOOD GASES ANY COMBINATION: CPT

## 2021-05-15 PROCEDURE — 84132 ASSAY OF SERUM POTASSIUM: CPT

## 2021-05-15 PROCEDURE — 97161 PT EVAL LOW COMPLEX 20 MIN: CPT

## 2021-05-15 PROCEDURE — 94640 AIRWAY INHALATION TREATMENT: CPT

## 2021-05-15 PROCEDURE — U0003: CPT

## 2021-05-15 PROCEDURE — 86900 BLOOD TYPING SEROLOGIC ABO: CPT

## 2021-05-15 PROCEDURE — 84100 ASSAY OF PHOSPHORUS: CPT

## 2021-05-15 PROCEDURE — 82306 VITAMIN D 25 HYDROXY: CPT

## 2021-05-15 PROCEDURE — 82607 VITAMIN B-12: CPT

## 2021-05-15 PROCEDURE — 85045 AUTOMATED RETICULOCYTE COUNT: CPT

## 2021-05-15 PROCEDURE — 80061 LIPID PANEL: CPT

## 2021-05-15 PROCEDURE — 82652 VIT D 1 25-DIHYDROXY: CPT

## 2021-05-15 PROCEDURE — 82436 ASSAY OF URINE CHLORIDE: CPT

## 2021-05-15 PROCEDURE — A9505: CPT

## 2021-05-15 PROCEDURE — 84484 ASSAY OF TROPONIN QUANT: CPT

## 2021-05-15 PROCEDURE — 93308 TTE F-UP OR LMTD: CPT

## 2021-05-15 PROCEDURE — 87040 BLOOD CULTURE FOR BACTERIA: CPT

## 2021-05-15 PROCEDURE — 85610 PROTHROMBIN TIME: CPT

## 2021-05-15 PROCEDURE — 82340 ASSAY OF CALCIUM IN URINE: CPT

## 2021-05-15 PROCEDURE — 71045 X-RAY EXAM CHEST 1 VIEW: CPT

## 2021-05-15 PROCEDURE — C1894: CPT

## 2021-05-15 PROCEDURE — 82746 ASSAY OF FOLIC ACID SERUM: CPT

## 2021-05-15 PROCEDURE — 85379 FIBRIN DEGRADATION QUANT: CPT

## 2021-05-15 PROCEDURE — C1725: CPT

## 2021-05-15 PROCEDURE — 82330 ASSAY OF CALCIUM: CPT

## 2021-05-15 PROCEDURE — 93976 VASCULAR STUDY: CPT

## 2021-05-15 PROCEDURE — 84300 ASSAY OF URINE SODIUM: CPT

## 2021-05-15 PROCEDURE — 85384 FIBRINOGEN ACTIVITY: CPT

## 2021-05-15 PROCEDURE — 83735 ASSAY OF MAGNESIUM: CPT

## 2021-05-15 PROCEDURE — 84295 ASSAY OF SERUM SODIUM: CPT

## 2021-05-15 PROCEDURE — 85730 THROMBOPLASTIN TIME PARTIAL: CPT

## 2021-05-15 PROCEDURE — 82310 ASSAY OF CALCIUM: CPT

## 2021-05-15 PROCEDURE — 83540 ASSAY OF IRON: CPT

## 2021-05-15 PROCEDURE — 86803 HEPATITIS C AB TEST: CPT

## 2021-05-15 PROCEDURE — 83615 LACTATE (LD) (LDH) ENZYME: CPT

## 2021-05-15 RX ORDER — CARVEDILOL PHOSPHATE 80 MG/1
1 CAPSULE, EXTENDED RELEASE ORAL
Qty: 60 | Refills: 0
Start: 2021-05-15 | End: 2021-06-13

## 2021-05-15 RX ORDER — ISOSORBIDE MONONITRATE 60 MG/1
1 TABLET, EXTENDED RELEASE ORAL
Qty: 30 | Refills: 0
Start: 2021-05-15 | End: 2021-06-13

## 2021-05-15 RX ORDER — APIXABAN 2.5 MG/1
1 TABLET, FILM COATED ORAL
Qty: 60 | Refills: 0
Start: 2021-05-15 | End: 2021-06-13

## 2021-05-15 RX ORDER — CLOPIDOGREL BISULFATE 75 MG/1
1 TABLET, FILM COATED ORAL
Qty: 30 | Refills: 0
Start: 2021-05-15 | End: 2021-06-13

## 2021-05-15 RX ORDER — AMLODIPINE BESYLATE 2.5 MG/1
1 TABLET ORAL
Qty: 30 | Refills: 0
Start: 2021-05-15 | End: 2021-06-13

## 2021-05-15 RX ORDER — ATORVASTATIN CALCIUM 80 MG/1
1 TABLET, FILM COATED ORAL
Qty: 30 | Refills: 0
Start: 2021-05-15 | End: 2021-06-13

## 2021-05-15 RX ORDER — HYDRALAZINE HCL 50 MG
1 TABLET ORAL
Qty: 30 | Refills: 0
Start: 2021-05-15 | End: 2021-06-13

## 2021-05-15 RX ORDER — ASPIRIN/CALCIUM CARB/MAGNESIUM 324 MG
1 TABLET ORAL
Qty: 30 | Refills: 0
Start: 2021-05-15 | End: 2021-06-13

## 2021-05-15 RX ADMIN — Medication 81 MILLIGRAM(S): at 11:21

## 2021-05-15 RX ADMIN — CLOPIDOGREL BISULFATE 75 MILLIGRAM(S): 75 TABLET, FILM COATED ORAL at 11:21

## 2021-05-15 RX ADMIN — AMLODIPINE BESYLATE 10 MILLIGRAM(S): 2.5 TABLET ORAL at 05:57

## 2021-05-15 RX ADMIN — CARVEDILOL PHOSPHATE 25 MILLIGRAM(S): 80 CAPSULE, EXTENDED RELEASE ORAL at 05:57

## 2021-05-15 RX ADMIN — Medication 100 MILLIGRAM(S): at 05:57

## 2021-05-15 RX ADMIN — Medication 200 MILLIGRAM(S): at 01:31

## 2021-05-15 RX ADMIN — BUDESONIDE AND FORMOTEROL FUMARATE DIHYDRATE 2 PUFF(S): 160; 4.5 AEROSOL RESPIRATORY (INHALATION) at 08:27

## 2021-05-15 RX ADMIN — APIXABAN 5 MILLIGRAM(S): 2.5 TABLET, FILM COATED ORAL at 05:57

## 2021-05-15 RX ADMIN — ISOSORBIDE MONONITRATE 120 MILLIGRAM(S): 60 TABLET, EXTENDED RELEASE ORAL at 11:21

## 2021-05-15 NOTE — PROGRESS NOTE ADULT - ASSESSMENT
Nephrology consulted for CKD stage 4 in 69yo M who admitted with hypertensive emergency, heart failure exacerbation, now s/p cardiac cath 5/10 revealing 3vCAD. Cardiac viability scan reveals minimal viable tissue.   s/p staged PCI of LAD 5/13.    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

## 2021-05-15 NOTE — PROGRESS NOTE ADULT - SUBJECTIVE AND OBJECTIVE BOX
Attending Attestation:     Briefly, 70 year old man, non-compliant with medications presented secondary to hypertensive urgency and new onset AF  patient underwent stent placement  blood pressure medications restarted  he was observed for extra 24 hr to make sure that his renal function was stable  patient is stable for discharge  follow up arrnaged        Sundeep Del Rio MD, [facility]  Cardiology Attending     I was physically present for the key portions of the evaluation and management (E/M) service provided.  I agree with the above history, physical, and plan which I have reviewed and edited where appropriate.     40 minutes spent on total encounter; more than 50% of the visit was spent counseling and/or coordinating care by the attending physician.

## 2021-05-15 NOTE — PROGRESS NOTE ADULT - SUBJECTIVE AND OBJECTIVE BOX
Patient is a 70y Male seen and evaluated at bedside. S/p PCI, creat plateau post contrast. Defer restarting diuretics and ARB before Monday    Meds:    acetaminophen   Tablet .. 650 every 6 hours PRN  allopurinol 100 two times a day  amLODIPine   Tablet 10 daily  apixaban 5 every 12 hours  aspirin  chewable 81 daily  atorvastatin 80 at bedtime  budesonide  80 MICROgram(s)/formoterol 4.5 MICROgram(s) Inhaler 2 two times a day  carvedilol 25 every 12 hours  clopidogrel Tablet 75 daily  dextrose 40% Gel 15 once  dextrose 5%. 1000 <Continuous>  dextrose 5%. 1000 <Continuous>  dextrose 50% Injectable 25 once  dextrose 50% Injectable 12.5 once  dextrose 50% Injectable 25 once  glucagon  Injectable 1 once  guaiFENesin Oral Liquid (Sugar-Free) 200 every 6 hours PRN  hydrALAZINE 100 every 8 hours  insulin lispro (ADMELOG) corrective regimen sliding scale  Before meals and at bedtime  isosorbide   mononitrate ER Tablet (IMDUR) 120 daily  tamsulosin 0.4 at bedtime      T(C): , Max: 37.1 (05-15-21 @ 05:43)  T(F): , Max: 98.7 (05-15-21 @ 05:43)  HR: 66 (05-15-21 @ 11:19)  BP: 150/81 (05-15-21 @ 11:19)  BP(mean): --  RR: 18 (05-15-21 @ 11:19)  SpO2: 98% (05-15-21 @ 11:19)  Wt(kg): --    05-14 @ 07:01  -  05-15 @ 07:00  --------------------------------------------------------  IN: 240 mL / OUT: 1600 mL / NET: -1360 mL    05-15 @ 07:01  -  05-15 @ 13:27  --------------------------------------------------------  IN: 180 mL / OUT: 0 mL / NET: 180 mL    Review of Systems:  RESPIRATORY: No shortness of breath  CARDIOVASCULAR: No chest pain  MUSCULOSKELETAL: No leg oedema    PHYSICAL EXAM:  GENERAL: well-developed, well nourished, alert, no acute distress at present on RA  CHEST/LUNG: Clear to auscultation bilaterally  HEART: normal S1S2, RRR  ABDOMEN: Soft, Nontender  EXTREMITIES: No oedema     LABS:                        9.1    9.19  )-----------( 158      ( 15 May 2021 06:47 )             30.2     05-15    138  |  105  |  46<H>  ----------------------------<  91  4.9   |  23  |  3.12<H>    Ca    8.9      15 May 2021 06:47  Phos  4.5     05-14  Mg     2.3     05-15                  RADIOLOGY & ADDITIONAL STUDIES:

## 2021-05-15 NOTE — PROGRESS NOTE ADULT - NSICDXPILOT_GEN_ALL_CORE
[place]

## 2021-05-16 ENCOUNTER — TRANSCRIPTION ENCOUNTER (OUTPATIENT)
Age: 71
End: 2021-05-16

## 2021-05-17 ENCOUNTER — APPOINTMENT (OUTPATIENT)
Dept: CARE COORDINATION | Facility: HOME HEALTH | Age: 71
End: 2021-05-17
Payer: MEDICARE

## 2021-05-17 DIAGNOSIS — Z78.9 OTHER SPECIFIED HEALTH STATUS: ICD-10-CM

## 2021-05-17 PROCEDURE — 99348 HOME/RES VST EST LOW MDM 30: CPT | Mod: 95

## 2021-05-19 PROBLEM — Z78.9 DOES NOT USE ILLICIT DRUGS: Status: ACTIVE | Noted: 2021-05-19

## 2021-05-19 RX ORDER — TORSEMIDE 20 MG/1
20 TABLET ORAL
Refills: 0 | Status: ACTIVE | COMMUNITY

## 2021-05-19 RX ORDER — FOLIC ACID 20 MG
CAPSULE ORAL
Refills: 0 | Status: ACTIVE | COMMUNITY

## 2021-05-19 RX ORDER — CARVEDILOL 25 MG/1
25 TABLET, FILM COATED ORAL
Refills: 0 | Status: ACTIVE | COMMUNITY

## 2021-05-19 RX ORDER — LEVALBUTEROL TARTRATE 45 UG/1
45 AEROSOL, METERED ORAL
Refills: 0 | Status: ACTIVE | COMMUNITY

## 2021-05-19 RX ORDER — ISOSORBIDE MONONITRATE 120 MG/1
120 TABLET, EXTENDED RELEASE ORAL
Refills: 0 | Status: ACTIVE | COMMUNITY

## 2021-05-19 RX ORDER — APIXABAN 5 MG/1
5 TABLET, FILM COATED ORAL
Refills: 0 | Status: ACTIVE | COMMUNITY

## 2021-05-19 RX ORDER — ATORVASTATIN CALCIUM 80 MG/1
80 TABLET, FILM COATED ORAL
Refills: 0 | Status: ACTIVE | COMMUNITY

## 2021-05-19 RX ORDER — ASPIRIN 81 MG/1
81 TABLET, COATED ORAL
Refills: 0 | Status: ACTIVE | COMMUNITY

## 2021-05-19 RX ORDER — SITAGLIPTIN 50 MG/1
50 TABLET, FILM COATED ORAL
Refills: 0 | Status: ACTIVE | COMMUNITY

## 2021-05-19 RX ORDER — CLOPIDOGREL 75 MG/1
75 TABLET, FILM COATED ORAL
Refills: 0 | Status: ACTIVE | COMMUNITY

## 2021-05-19 RX ORDER — ALLOPURINOL 100 MG/1
100 TABLET ORAL
Refills: 0 | Status: ACTIVE | COMMUNITY

## 2021-05-19 RX ORDER — AMLODIPINE BESYLATE 10 MG/1
10 TABLET ORAL
Refills: 0 | Status: ACTIVE | COMMUNITY

## 2021-05-19 RX ORDER — MOMETASONE FUROATE AND FORMOTEROL FUMARATE DIHYDRATE 50; 5 UG/1; UG/1
AEROSOL RESPIRATORY (INHALATION)
Refills: 0 | Status: ACTIVE | COMMUNITY

## 2021-05-19 RX ORDER — CALCITRIOL 0.5 UG/1
CAPSULE, LIQUID FILLED ORAL
Refills: 0 | Status: ACTIVE | COMMUNITY

## 2021-05-19 RX ORDER — TAMSULOSIN HYDROCHLORIDE 0.4 MG/1
0.4 CAPSULE ORAL
Refills: 0 | Status: ACTIVE | COMMUNITY

## 2021-05-19 RX ORDER — HYDRALAZINE HYDROCHLORIDE 100 MG/1
100 TABLET ORAL
Refills: 0 | Status: ACTIVE | COMMUNITY

## 2021-05-19 NOTE — PHYSICAL EXAM
[No Acute Distress] : no acute distress [Well Nourished] : well nourished [Well Developed] : well developed [Normal Sclera/Conjunctiva] : normal sclera/conjunctiva [Normal Outer Ear/Nose] : the outer ears and nose were normal in appearance [No JVD] : no jugular venous distention [No Respiratory Distress] : no respiratory distress  [No Accessory Muscle Use] : no accessory muscle use [No Edema] : there was no peripheral edema [Non Tender] : non-tender [de-identified] : Limited Visual Physical Exam during TeleHealth Visit [de-identified] : Awake and alert [de-identified] : Calm

## 2021-05-19 NOTE — COUNSELING
[Fall prevention counseling provided] : Fall prevention counseling provided [No throw rugs] : No throw rugs [Use proper foot wear] : Use proper foot wear [Use recommended devices] : Use recommended devices [Behavioral health counseling provided] : Behavioral health counseling provided [Sleep ___ hours/day] : Sleep [unfilled] hours/day [Engage in a relaxing activity] : Engage in a relaxing activity [Plan in advance] : Plan in advance [None] : None [Needs reinforcement, provided] : Patient needs reinforcement on understanding of lifestyle changes and steps needed to achieve self management goal; reinforcement was provided

## 2021-05-19 NOTE — HISTORY OF PRESENT ILLNESS
[Home] : at home, [unfilled] , at the time of the visit. [Other Location: e.g. Home (Enter Location, City,State)___] : at [unfilled] [Other:____] : [unfilled] [Verbal consent obtained from patient] : the patient, [unfilled] [FreeTextEntry1] : Follow up post discharge NSTEMI [de-identified] : This patient is Enrolled in the [employer] Program through [email or web address]\par Copied As per [facility] Discharge Summary\par \par "71yo non-compliant Male, former smoker, with PMHx of uncontrolled HTN, HLD, DM-II, CAD, PAD (s/p stents in L leg, per pt), sCHF (EF 25-30%), COPD (on home O2 PRN) and CKD (baseline Cr 3) who presented c/o SOB, found to be in acute hypoxic respiratory failure requiring BIPAP in setting of HTN emergency with flash pulm edema, 2/2 medication non compliance. S/p IV diuresis, discontinued \par when euvolemic. Hospital course significant for uptrending leukocytosis and completed IV abx for suspected CAP on 5/12. Pt R/I NSTEMI with peak Trop 0.16 and TWI in inferolaterals leads and found to be in new Afib, now s/p self conversion to NSR. S/p cardiac cath 5/10 revealing mRCA , mLCx , mLAD 80%, LM normal, access r groin PC and failed R radial. Cardiac viability scan revealed small area of mild reversibility/viability in the apex. Pt subsequently underwent staged PCI of mLAD 5/13 with Dr. Pires.  Pt now s/p cardiac cath (5/13/21) w/ ANSLEY x2 p/mLAD. L radial access site stable.  Patient Cr post cath 3.28 (not JOSE LUIS, at patient's baseline), and downtrended to 3.12 on day of discharge. Patient will follow up closely outpatient with cardiologist Dr. Simental on 5/19/21, and with nephrologist Dr. Richardson on 5/20/21. "  The patient was discharged with home care services and follow up care.\par \par During the TeleHealth the patient was in no acute distress.  Able to speak with complete sentences and no audible wheeze noted.\par The patient denies chest pain, shortness of breath, cough, hemoptysis, fever, palpitations, syncope.

## 2021-05-20 ENCOUNTER — APPOINTMENT (OUTPATIENT)
Dept: NEPHROLOGY | Facility: CLINIC | Age: 71
End: 2021-05-20
Payer: MEDICARE

## 2021-05-20 VITALS
HEIGHT: 67 IN | HEART RATE: 72 BPM | SYSTOLIC BLOOD PRESSURE: 118 MMHG | BODY MASS INDEX: 29.03 KG/M2 | WEIGHT: 185 LBS | DIASTOLIC BLOOD PRESSURE: 63 MMHG

## 2021-05-20 DIAGNOSIS — I48.91 UNSPECIFIED ATRIAL FIBRILLATION: ICD-10-CM

## 2021-05-20 DIAGNOSIS — I73.9 PERIPHERAL VASCULAR DISEASE, UNSPECIFIED: ICD-10-CM

## 2021-05-20 DIAGNOSIS — Z95.5 PRESENCE OF CORONARY ANGIOPLASTY IMPLANT AND GRAFT: ICD-10-CM

## 2021-05-20 DIAGNOSIS — Z86.79 PERSONAL HISTORY OF OTHER DISEASES OF THE CIRCULATORY SYSTEM: ICD-10-CM

## 2021-05-20 DIAGNOSIS — E78.5 HYPERLIPIDEMIA, UNSPECIFIED: ICD-10-CM

## 2021-05-20 DIAGNOSIS — A04.72 ENTEROCOLITIS DUE TO CLOSTRIDIUM DIFFICILE, NOT SPECIFIED AS RECURRENT: ICD-10-CM

## 2021-05-20 DIAGNOSIS — Z78.9 OTHER SPECIFIED HEALTH STATUS: ICD-10-CM

## 2021-05-20 DIAGNOSIS — I25.10 ATHEROSCLEROTIC HEART DISEASE OF NATIVE CORONARY ARTERY W/OUT ANGINA PECTORIS: ICD-10-CM

## 2021-05-20 DIAGNOSIS — E11.29 TYPE 2 DIABETES MELLITUS WITH OTHER DIABETIC KIDNEY COMPLICATION: ICD-10-CM

## 2021-05-20 DIAGNOSIS — N25.81 SECONDARY HYPERPARATHYROIDISM OF RENAL ORIGIN: ICD-10-CM

## 2021-05-20 DIAGNOSIS — E11.9 TYPE 2 DIABETES MELLITUS W/OUT COMPLICATIONS: ICD-10-CM

## 2021-05-20 DIAGNOSIS — I50.20 UNSPECIFIED SYSTOLIC (CONGESTIVE) HEART FAILURE: ICD-10-CM

## 2021-05-20 DIAGNOSIS — Z86.39 PERSONAL HISTORY OF OTHER ENDOCRINE, NUTRITIONAL AND METABOLIC DISEASE: ICD-10-CM

## 2021-05-20 DIAGNOSIS — N40.0 BENIGN PROSTATIC HYPERPLASIA WITHOUT LOWER URINARY TRACT SYMPMS: ICD-10-CM

## 2021-05-20 DIAGNOSIS — Z87.448 PERSONAL HISTORY OF OTHER DISEASES OF URINARY SYSTEM: ICD-10-CM

## 2021-05-20 DIAGNOSIS — Z87.09 PERSONAL HISTORY OF OTHER DISEASES OF THE RESPIRATORY SYSTEM: ICD-10-CM

## 2021-05-20 DIAGNOSIS — I10 ESSENTIAL (PRIMARY) HYPERTENSION: ICD-10-CM

## 2021-05-20 DIAGNOSIS — J96.01 ACUTE RESPIRATORY FAILURE WITH HYPOXIA: ICD-10-CM

## 2021-05-20 DIAGNOSIS — N18.4 CHRONIC KIDNEY DISEASE, STAGE 4 (SEVERE): ICD-10-CM

## 2021-05-20 DIAGNOSIS — Z87.01 PERSONAL HISTORY OF PNEUMONIA (RECURRENT): ICD-10-CM

## 2021-05-20 DIAGNOSIS — I21.4 NON-ST ELEVATION (NSTEMI) MYOCARDIAL INFARCTION: ICD-10-CM

## 2021-05-20 PROCEDURE — 99204 OFFICE O/P NEW MOD 45 MIN: CPT

## 2021-05-20 PROCEDURE — 99072 ADDL SUPL MATRL&STAF TM PHE: CPT

## 2021-05-20 NOTE — PHYSICAL EXAM
[General Appearance - Alert] : alert [General Appearance - In No Acute Distress] : in no acute distress [Sclera] : the sclera and conjunctiva were normal [PERRL With Normal Accommodation] : pupils were equal in size, round, and reactive to light [Outer Ear] : the ears and nose were normal in appearance [Neck Appearance] : the appearance of the neck was normal [Neck Cervical Mass (___cm)] : no neck mass was observed [Jugular Venous Distention Increased] : there was no jugular-venous distention [Auscultation Breath Sounds / Voice Sounds] : lungs were clear to auscultation bilaterally [Heart Rate And Rhythm] : heart rate was normal and rhythm regular [Heart Sounds Gallop] : no gallops [Heart Sounds] : normal S1 and S2 [Murmurs] : no murmurs [Heart Sounds Pericardial Friction Rub] : no pericardial rub [Edema] : there was no peripheral edema [No CVA Tenderness] : no ~M costovertebral angle tenderness [Abnormal Walk] : normal gait [Nail Clubbing] : no clubbing  or cyanosis of the fingernails [Musculoskeletal - Swelling] : no joint swelling seen [Motor Tone] : muscle strength and tone were normal [Skin Color & Pigmentation] : normal skin color and pigmentation [Skin Turgor] : normal skin turgor [] : no rash [Deep Tendon Reflexes (DTR)] : deep tendon reflexes were 2+ and symmetric [No Focal Deficits] : no focal deficits [Impaired Insight] : insight and judgment were intact [Oriented To Time, Place, And Person] : oriented to person, place, and time [Affect] : the affect was normal

## 2021-05-20 NOTE — ASSESSMENT
[FreeTextEntry1] : 70-year-old man with a history of hypertension, type 2 diabetes, CAD, PAD, gout, CKD 4 -hospitalized for 10 days and discharged 5 days ago with CHF, NSTEMI, new A. fib, and underwent PCI of LAD.  LVEF was 25 to 30%.  Creatinine was 3.1 at discharge, which is apparently his usual baseline.  I have ordered a renal ultrasound to assess kidney size, echogenicity, and rule out obstructive uropathy.  I ordered follow-up labs to include BMP, PTH, phosphorus, uric acid.  He will continue his current antihypertensive meds including amlodipine 10 mg, carvedilol 25 mg twice daily, and torsemide 20 mg daily.  If all is stable, he will return in 2 months.

## 2021-05-20 NOTE — CONSULT LETTER
[Dear  ___] : Dear  [unfilled], [Consult Letter:] : I had the pleasure of evaluating your patient, [unfilled]. [Please see my note below.] : Please see my note below. [Consult Closing:] : Thank you very much for allowing me to participate in the care of this patient.  If you have any questions, please do not hesitate to contact me. [Sincerely,] : Sincerely, [FreeTextEntry2] : Dr Armstrong (his PCP thru [facility]) [FreeTextEntry3] : Sincerely, \par \par Mykel Richardson MD, FACP

## 2021-05-20 NOTE — HISTORY OF PRESENT ILLNESS
[FreeTextEntry1] : 70-year-old man with a history of uncontrolled hypertension, type 2 diabetes, CAD, PAD with stents, hyperlipidemia, CHF with an ejection fraction of 25 to 30%, COPD and a former smoker, and a history of CKD with a creatinine of about 3.1 as a baseline.  He was hospitalized here at [facility] from May 5 to May 15 and new A. fib was discovered.  He underwent cardiac cath and a staged PCI of the LAD on 513.  Post catheterization, his creatinine was 3.3 and fell to 3.1 at discharge.  He also appears to have a history of gout and thinks he was on allopurinol in the past.  His med list includes calcitriol, and he recalls seeing a nephrologist at [facility].  His PCP is Dr. Armstrong at [facility].  His dyspnea is much improved since the hospitalization.  He sleeps on one pillow now.  His appetite is good.

## 2021-05-21 DIAGNOSIS — Z99.81 DEPENDENCE ON SUPPLEMENTAL OXYGEN: ICD-10-CM

## 2021-05-21 DIAGNOSIS — Z79.84 LONG TERM (CURRENT) USE OF ORAL HYPOGLYCEMIC DRUGS: ICD-10-CM

## 2021-05-21 DIAGNOSIS — I25.10 ATHEROSCLEROTIC HEART DISEASE OF NATIVE CORONARY ARTERY WITHOUT ANGINA PECTORIS: ICD-10-CM

## 2021-05-21 DIAGNOSIS — Z79.82 LONG TERM (CURRENT) USE OF ASPIRIN: ICD-10-CM

## 2021-05-21 DIAGNOSIS — J96.02 ACUTE RESPIRATORY FAILURE WITH HYPERCAPNIA: ICD-10-CM

## 2021-05-21 DIAGNOSIS — E78.5 HYPERLIPIDEMIA, UNSPECIFIED: ICD-10-CM

## 2021-05-21 DIAGNOSIS — I21.4 NON-ST ELEVATION (NSTEMI) MYOCARDIAL INFARCTION: ICD-10-CM

## 2021-05-21 DIAGNOSIS — Z79.51 LONG TERM (CURRENT) USE OF INHALED STEROIDS: ICD-10-CM

## 2021-05-21 DIAGNOSIS — Z95.828 PRESENCE OF OTHER VASCULAR IMPLANTS AND GRAFTS: ICD-10-CM

## 2021-05-21 DIAGNOSIS — I50.23 ACUTE ON CHRONIC SYSTOLIC (CONGESTIVE) HEART FAILURE: ICD-10-CM

## 2021-05-21 DIAGNOSIS — I25.84 CORONARY ATHEROSCLEROSIS DUE TO CALCIFIED CORONARY LESION: ICD-10-CM

## 2021-05-21 DIAGNOSIS — I48.91 UNSPECIFIED ATRIAL FIBRILLATION: ICD-10-CM

## 2021-05-21 DIAGNOSIS — E11.51 TYPE 2 DIABETES MELLITUS WITH DIABETIC PERIPHERAL ANGIOPATHY WITHOUT GANGRENE: ICD-10-CM

## 2021-05-21 DIAGNOSIS — Z87.891 PERSONAL HISTORY OF NICOTINE DEPENDENCE: ICD-10-CM

## 2021-05-21 DIAGNOSIS — D72.829 ELEVATED WHITE BLOOD CELL COUNT, UNSPECIFIED: ICD-10-CM

## 2021-05-21 DIAGNOSIS — Z91.14 PATIENT'S OTHER NONCOMPLIANCE WITH MEDICATION REGIMEN: ICD-10-CM

## 2021-05-21 DIAGNOSIS — J44.9 CHRONIC OBSTRUCTIVE PULMONARY DISEASE, UNSPECIFIED: ICD-10-CM

## 2021-05-21 DIAGNOSIS — N18.4 CHRONIC KIDNEY DISEASE, STAGE 4 (SEVERE): ICD-10-CM

## 2021-05-21 DIAGNOSIS — I25.82 CHRONIC TOTAL OCCLUSION OF CORONARY ARTERY: ICD-10-CM

## 2021-05-21 DIAGNOSIS — D64.9 ANEMIA, UNSPECIFIED: ICD-10-CM

## 2021-05-21 DIAGNOSIS — J18.9 PNEUMONIA, UNSPECIFIED ORGANISM: ICD-10-CM

## 2021-05-21 DIAGNOSIS — N40.0 BENIGN PROSTATIC HYPERPLASIA WITHOUT LOWER URINARY TRACT SYMPTOMS: ICD-10-CM

## 2021-05-21 DIAGNOSIS — I25.2 OLD MYOCARDIAL INFARCTION: ICD-10-CM

## 2021-05-21 DIAGNOSIS — Z88.8 ALLERGY STATUS TO OTHER DRUGS, MEDICAMENTS AND BIOLOGICAL SUBSTANCES STATUS: ICD-10-CM

## 2021-05-21 DIAGNOSIS — I16.1 HYPERTENSIVE EMERGENCY: ICD-10-CM

## 2021-05-21 DIAGNOSIS — I13.0 HYPERTENSIVE HEART AND CHRONIC KIDNEY DISEASE WITH HEART FAILURE AND STAGE 1 THROUGH STAGE 4 CHRONIC KIDNEY DISEASE, OR UNSPECIFIED CHRONIC KIDNEY DISEASE: ICD-10-CM

## 2021-05-21 DIAGNOSIS — J96.01 ACUTE RESPIRATORY FAILURE WITH HYPOXIA: ICD-10-CM

## 2021-07-22 ENCOUNTER — APPOINTMENT (OUTPATIENT)
Dept: NEPHROLOGY | Facility: CLINIC | Age: 71
End: 2021-07-22

## 2021-10-26 NOTE — DISCHARGE NOTE NURSING/CASE MANAGEMENT/SOCIAL WORK - NSDCPEELIQUISCOMP_GEN_ALL_CORE
Apixaban/Eliquis is used to treat and prevent blood clots. If you are not able to swallow the tablets whole, they may be crushed and mixed in water, apple juice, or applesauce and promptly taken within four hours. Never skip a dose of Apixaban/Eliquis. If you forget to take your Apixaban/Eliquis, take a dose as soon as you remember. If it is almost time for your next Apixaban/Eliquis dose, wait until then and take a regular dose. DO NOT take an extra pill to ‘catch up’.  NEVER TAKE A DOUBLE DOSE. Notify your doctor that you missed a dose. Take Apixaban/Eliquis at the same time each morning and evening. Apixaban/Eliquis may be taken with other medication or food.
No

## 2021-10-29 NOTE — H&P ADULT - GASTROINTESTINAL DETAILS
normal/soft/nontender/no distention/bowel sounds normal/no bruit/no rebound tenderness/no guarding/no rigidity Acitretin Pregnancy And Lactation Text: This medication is Pregnancy Category X and should not be given to women who are pregnant or may become pregnant in the future. This medication is excreted in breast milk.

## 2022-01-01 NOTE — PROGRESS NOTE ADULT - PROBLEM SELECTOR PLAN 1
Spontaneous presented with /131 in the setting of medication non compliance, currently SBP 140s  -s/p Nitro gtt  -Continue Carvedilol 25mg BID, Hydralazine 100mg TID, Imdur 30mg QD and Amlodipine 10mg QD  -Holding home Losartan 100mg for elevated Cr  -Holding home Clonidine 0.1mg BID Presented with substernal CP, currently CP free and HD stable  -Trop peak 0.16 and downtrended to 0.14  -EKG developed TWI in inferolateral leads  -Plan for cardiac cath Monday, pt consented, NPO after MN and s/p ASA 325mg and Plavix 600mg load  -Continue Heparin gtt, ASA 81mg QD, Plavix 75mg QD, Coreg 25mg BID, Atorvastatin 80mg HS

## 2022-06-20 NOTE — PROGRESS NOTE ADULT - PROBLEM SELECTOR PLAN 6
Patient is wanting to know if Dr. Sherita Lujan will see them for her knee. Patient is 66 yrs of age. Please advise.  Ph# [phone] --New onset AFib, s/p self-conversion to NSR.    --CONT: Coreg 25mg PO BID and Heparin gtt.    --Transition to Eliquis post PCI.

## 2022-06-23 NOTE — DISCHARGE NOTE NURSING/CASE MANAGEMENT/SOCIAL WORK - NSDCFUADDAPPT_GEN_ALL_CORE_FT
Please see wound care instructions which have been provided separately.     
(1) Follow up with you cardiologist, Dr. Simental, on WEDNESDAY, MAY 19th, 2021 at 10:40am.   (2) Follow up with nephrologist, Dr. Richardson, on THURSDAY, MAY 20th, 2021 at 4:20pm.

## 2022-12-15 NOTE — PHYSICAL THERAPY INITIAL EVALUATION ADULT - FOLLOWS COMMANDS/ANSWERS QUESTIONS, REHAB EVAL
explained to patient that we will send over consult for [facility], that it will probably take a while to get in to them. 100% of the time

## 2024-05-07 NOTE — DISCHARGE NOTE NURSING/CASE MANAGEMENT/SOCIAL WORK - PATIENT PORTAL LINK FT
You can access the FollowMyHealth Patient Portal offered by [facility] by registering at the following website: [email or web address]. By joining [employer]’s FollowMyHealth portal, you will also be able to view your health information using other applications (apps) compatible with our system.
Detail Level: Zone